# Patient Record
Sex: FEMALE | Race: WHITE | NOT HISPANIC OR LATINO | Employment: OTHER | URBAN - METROPOLITAN AREA
[De-identification: names, ages, dates, MRNs, and addresses within clinical notes are randomized per-mention and may not be internally consistent; named-entity substitution may affect disease eponyms.]

---

## 2021-09-07 ENCOUNTER — EVALUATION (OUTPATIENT)
Dept: PHYSICAL THERAPY | Facility: MEDICAL CENTER | Age: 71
End: 2021-09-07
Payer: COMMERCIAL

## 2021-09-07 DIAGNOSIS — Z96.642 STATUS POST TOTAL REPLACEMENT OF LEFT HIP: Primary | ICD-10-CM

## 2021-09-07 DIAGNOSIS — M25.551 RIGHT HIP PAIN: ICD-10-CM

## 2021-09-07 PROCEDURE — 97161 PT EVAL LOW COMPLEX 20 MIN: CPT | Performed by: PHYSICAL THERAPIST

## 2021-09-07 PROCEDURE — 97110 THERAPEUTIC EXERCISES: CPT | Performed by: PHYSICAL THERAPIST

## 2021-09-07 NOTE — LETTER
2021    [employer] [facility] [address] [facility] [address]    Patient: Nida Solis   YOB: 1950   Date of Visit: 2021     Encounter Diagnosis     ICD-10-CM    1  Status post total replacement of left hip  Z96 642    2  Right hip pain  M25 551        Dear Dr Santillan Chol: Thank you for your recent referral of Nida Solis  Please review the attached evaluation summary from Renay's recent visit  Please verify that you agree with the plan of care by signing the attached order  If you have any questions or concerns, please do not hesitate to call  I sincerely appreciate the opportunity to share in the care of one of your patients and hope to have another opportunity to work with you in the near future  Sincerely,    Kylie Estrada, PT      Referring Provider:      I certify that I have read the below Plan of Care and certify the need for these services furnished under this plan of treatment while under my care  Bibiana Stark [facility] [address] [address]  Via Fax: [phone]          PT Evaluation     Today's date: 2021  Patient name: Nida Solis  : 1950  MRN: [id number]  Referring provider: Alfredo Tran*  Dx:   Encounter Diagnosis     ICD-10-CM    1  Status post total replacement of left hip  Z96 642    2  Right hip pain  M25 551        Start Time: 1015  Stop Time: 1102  Total time in clinic (min): 47 minutes    Assessment  Assessment details: Pt is a 66 y o female who presents s/p L posterior NICA performed on 2021  Pt presents with increased R hip pain, decreased R hip ROM, decreased LE strength, gait impairments, poor stair mechanics and decreased activity tolerance secondary to increased symptoms  Pt denies pain or functional limitations secondary to L NICA    These impairments limit the patient from participating in completing stairs at [facility], decreased ability to complete house hold tasks such as cleaning, and decreased ability to participate in hobbies such as playing with dog  I believe this patient is a good candidate for and will benefit from skilled physical therapy for LE strengthening exercises, R hip ROM exercises, gait training and stair training to improve limitations stated above and assist the patient to prepare for up coming R NICA in October  Thank you for the opportunity to participate in Renay's care  Positive Prognostic Indicators: desire to improve, good tolerance to HHPT    Negative Prognostic Indicators:   Impairments: abnormal gait, abnormal or restricted ROM, abnormal movement, activity intolerance, impaired physical strength, lacks appropriate home exercise program and pain with function  Understanding of Dx/Px/POC: good   Prognosis: good    Goals  STGs: 4 weeks  1) Pt will have SPR decrease of 2 units at worst  2) pt will have improved R knee flexion strength by 1/2 muscle grade  3) pt will have improved foto score of 10 points    LTGs: 8 weeks  1) pt will be independent with HEP by D/C  2) pt will be independent with symptom management by D/C  3) pt will demonstrate ability to ascend and descend stairs reciprocally with 1 UE in order to demonstrate improved stair mechanics by DC      Plan  Patient would benefit from: skilled physical therapy  Planned modality interventions: cryotherapy and thermotherapy: hydrocollator packs  Planned therapy interventions: joint mobilization, manual therapy, neuromuscular re-education, patient education, strengthening, stretching, therapeutic activities, therapeutic exercise, home exercise program, gait training and functional ROM exercises  Frequency: 2x week  Duration in weeks: 8  Plan of Care beginning date: 9/7/2021  Plan of Care expiration date: 11/2/2021  Treatment plan discussed with: patient        Subjective Evaluation    History of Present Illness  Mechanism of injury: DOO:  NICKO:      Subjective Comments: pt reports L NICA on 7/27/2021  She reports that her L hip is ding well, but her R hip has increased pain  She states that her R hip is next to get replaced  This is scheduled for 10/8/2021  denies N&T in her LEs  Pt reports that she can do stairs, but she goes sideways  Denies falls  Pt ambulates with SPC all the time  Pt denies any pain or complications with her L hip with all functional activity  Pt has R hip pain that radiates to her R knee (which is replaced)  She puts ice on her R knee  Pain   Rest: 0/10   Best: 0/10    Worst: 0/10    R Hip:    Rest: 9/10    Best: 8/10    Worst: 9/10    Relieving Factors: Ice to R knee  Does not like to take medication  Exacerbating Factors: getting out of bed  Walking  Stairs  Sleeping: increased difficulty, attempts to sleep on R side, but this causes increased pain  Pt able to recite hip precautions  Home Set-up: pt has 14 steps from basement to main floor  Outside stairs are set in and more difficult  L railing going up  ADLs: independent     Work/Hobbies: watches TV, plays with dog  Previous Treatment: HHPT, not currently having this  Goals:  Wants to decrease pain in R hip  Wants to get R hip stronger for surgery in October               Objective     Active Range of Motion   Left Hip   Flexion: 90 degrees     Right Hip   Flexion: 95 degrees     Passive Range of Motion   Left Hip   Flexion: 90 degrees   Abduction: 35 degrees     Right Hip   Flexion: 95 degrees with pain  Abduction: 25 degrees with pain    Strength/Myotome Testing     Left Hip   Planes of Motion   Flexion: 4+  Abduction: 5  Adduction: 5    Right Hip   Planes of Motion   Flexion: 4+  Abduction: 5  Adduction: 5    Left Knee   Flexion: 4  Extension: 4+    Right Knee   Flexion: 4  Extension: 4+    Left Ankle/Foot   Dorsiflexion: 5  Plantar flexion: 5    Right Ankle/Foot   Dorsiflexion: 5  Plantar flexion: 5    Ambulation     Ambulation: Level Surfaces   Ambulation with assistive device: independent    Additional Level Surfaces Ambulation Details  SPC    Ambulation: Stairs   Ascend stairs: independent  Pattern: non-reciprocal  Railings: two rails  Descend stairs: independent  Pattern: non-reciprocal  Railings: two rails    Additional Stairs Ambulation Details  Pt able to complete stairs reciprocally when cued  Pt educated how to utilize [facility] with stairs and L railing (home set up)    Observational Gait   Gait: antalgic and asymmetric   Decreased walking speed, stride length and right stance time  Functional Assessment      Squat    Pain, left tibial anterior translation beyond toes and right tibial anterior translation beyond toes                Precautions: Posterior Hip precautions, R knee TKA, Hx lower lumbar fusion, R shoulder TSA      Manuals 9/7            L hip PROM                                                    Neuro Re-Ed             bridges x10            Hip add 5" x10            Hip abd             marching x10                                                   Ther Ex             bike             Mini squats x10            Side stepping             Step ups             Lateral step ups             Heel slides             Standing hip abd/ext x10 ea  b/l                         Ther Activity                                       Gait Training                                       Modalities

## 2021-09-07 NOTE — PROGRESS NOTES
PT Evaluation     Today's date: 2021  Patient name: Leida Eastman  : 1950  MRN: [id number]  Referring provider: Hayley Guillen*  Dx:   Encounter Diagnosis     ICD-10-CM    1  Status post total replacement of left hip  Z96 642    2  Right hip pain  M25 551        Start Time: 1015  Stop Time: 1102  Total time in clinic (min): 47 minutes    Assessment  Assessment details: Pt is a 66 y o female who presents s/p L posterior NICA performed on 2021  Pt presents with increased R hip pain, decreased R hip ROM, decreased LE strength, gait impairments, poor stair mechanics and decreased activity tolerance secondary to increased symptoms  Pt denies pain or functional limitations secondary to L NICA  These impairments limit the patient from participating in completing stairs at [facility], decreased ability to complete house hold tasks such as cleaning, and decreased ability to participate in hobbies such as playing with dog  I believe this patient is a good candidate for and will benefit from skilled physical therapy for LE strengthening exercises, R hip ROM exercises, gait training and stair training to improve limitations stated above and assist the patient to prepare for up coming R NICA in October  Thank you for the opportunity to participate in Renay's care      Positive Prognostic Indicators: desire to improve, good tolerance to HHPT    Negative Prognostic Indicators:   Impairments: abnormal gait, abnormal or restricted ROM, abnormal movement, activity intolerance, impaired physical strength, lacks appropriate home exercise program and pain with function  Understanding of Dx/Px/POC: good   Prognosis: good    Goals  STGs: 4 weeks  1) Pt will have SPR decrease of 2 units at worst  2) pt will have improved R knee flexion strength by 1/2 muscle grade  3) pt will have improved foto score of 10 points    LTGs: 8 weeks  1) pt will be independent with HEP by D/C  2) pt will be independent with symptom management by D/C  3) pt will demonstrate ability to ascend and descend stairs reciprocally with 1 UE in order to demonstrate improved stair mechanics by DC  Plan  Patient would benefit from: skilled physical therapy  Planned modality interventions: cryotherapy and thermotherapy: hydrocollator packs  Planned therapy interventions: joint mobilization, manual therapy, neuromuscular re-education, patient education, strengthening, stretching, therapeutic activities, therapeutic exercise, home exercise program, gait training and functional ROM exercises  Frequency: 2x week  Duration in weeks: 8  Plan of Care beginning date: 9/7/2021  Plan of Care expiration date: 11/2/2021  Treatment plan discussed with: patient        Subjective Evaluation    History of Present Illness  Mechanism of injury: DOO:  NICKO:      Subjective Comments: pt reports L NICA on 7/27/2021  She reports that her L hip is ding well, but her R hip has increased pain  She states that her R hip is next to get replaced  This is scheduled for 10/8/2021  denies N&T in her LEs  Pt reports that she can do stairs, but she goes sideways  Denies falls  Pt ambulates with SPC all the time  Pt denies any pain or complications with her L hip with all functional activity  Pt has R hip pain that radiates to her R knee (which is replaced)  She puts ice on her R knee  Pain   Rest: 0/10   Best: 0/10    Worst: 0/10    R Hip:    Rest: 9/10    Best: 8/10    Worst: 9/10    Relieving Factors: Ice to R knee  Does not like to take medication  Exacerbating Factors: getting out of bed  Walking  Stairs  Sleeping: increased difficulty, attempts to sleep on R side, but this causes increased pain  Pt able to recite hip precautions  Home Set-up: pt has 14 steps from basement to main floor  Outside stairs are set in and more difficult  L railing going up  ADLs: independent     Work/Hobbies: watches TV, plays with dog      Previous Treatment: HHPT, not currently having this  Goals:  Wants to decrease pain in R hip  Wants to get R hip stronger for surgery in October  Objective     Active Range of Motion   Left Hip   Flexion: 90 degrees     Right Hip   Flexion: 95 degrees     Passive Range of Motion   Left Hip   Flexion: 90 degrees   Abduction: 35 degrees     Right Hip   Flexion: 95 degrees with pain  Abduction: 25 degrees with pain    Strength/Myotome Testing     Left Hip   Planes of Motion   Flexion: 4+  Abduction: 5  Adduction: 5    Right Hip   Planes of Motion   Flexion: 4+  Abduction: 5  Adduction: 5    Left Knee   Flexion: 4  Extension: 4+    Right Knee   Flexion: 4  Extension: 4+    Left Ankle/Foot   Dorsiflexion: 5  Plantar flexion: 5    Right Ankle/Foot   Dorsiflexion: 5  Plantar flexion: 5    Ambulation     Ambulation: Level Surfaces   Ambulation with assistive device: independent    Additional Level Surfaces Ambulation Details  SPC    Ambulation: Stairs   Ascend stairs: independent  Pattern: non-reciprocal  Railings: two rails  Descend stairs: independent  Pattern: non-reciprocal  Railings: two rails    Additional Stairs Ambulation Details  Pt able to complete stairs reciprocally when cued  Pt educated how to utilize [facility] with stairs and L railing (home set up)    Observational Gait   Gait: antalgic and asymmetric   Decreased walking speed, stride length and right stance time  Functional Assessment      Squat    Pain, left tibial anterior translation beyond toes and right tibial anterior translation beyond toes                Precautions: Posterior Hip precautions, R knee TKA, Hx lower lumbar fusion, R shoulder TSA      Manuals 9/7            L hip PROM                                                    Neuro Re-Ed             bridges x10            Hip add 5" x10            Hip abd             marching x10                                                   Ther Ex             bike             Mini squats x10            Side stepping Step ups             Lateral step ups             Heel slides             Standing hip abd/ext x10 ea  b/l                         Ther Activity                                       Gait Training                                       Modalities

## 2021-09-10 ENCOUNTER — OFFICE VISIT (OUTPATIENT)
Dept: PHYSICAL THERAPY | Facility: MEDICAL CENTER | Age: 71
End: 2021-09-10
Payer: COMMERCIAL

## 2021-09-10 DIAGNOSIS — M25.551 RIGHT HIP PAIN: ICD-10-CM

## 2021-09-10 DIAGNOSIS — Z96.642 STATUS POST TOTAL REPLACEMENT OF LEFT HIP: Primary | ICD-10-CM

## 2021-09-10 PROCEDURE — 97112 NEUROMUSCULAR REEDUCATION: CPT | Performed by: PHYSICAL THERAPIST

## 2021-09-10 PROCEDURE — 97110 THERAPEUTIC EXERCISES: CPT | Performed by: PHYSICAL THERAPIST

## 2021-09-10 NOTE — PROGRESS NOTES
Daily Note     Today's date: 9/10/2021  Patient name: Aurelio Kathleen  : 1950  MRN: [id number]  Referring provider: Tonya Moreland*  Dx:   Encounter Diagnosis     ICD-10-CM    1  Status post total replacement of left hip  Z96 642    2  Right hip pain  M25 551        Start Time: 0800  Stop Time: 0834  Total time in clinic (min): 34 minutes    Subjective: pt reports to therapy stating that she had some soreness afterwards, but this is improving  Objective: See treatment diary below      Assessment: Tolerated treatment well  Pt completed all exercises with increased R hip pain that was maintained in a tolerable range  HEP progressed  Pt educated to monitor symptoms and gauge HEP intensity as tolerated  We will continue to progress as tolerated  Patient would benefit from continued PT      Plan: Continue per plan of care        Precautions: Posterior Hip precautions, R knee TKA, Hx lower lumbar fusion, R shoulder TSA      Manuals  910           L hip PROM                                                    Neuro Re-Ed             bridges x10 x10           Hip add 5" x10 5"x10           Hip abd  gtb 3" x20           marching x10 2x10                                                  Ther Ex             bike  5'           Mini squats x10 x10           Side stepping  3 laps           Step ups             Lateral step ups             Heel slides  x20           Standing hip abd/ext x10 ea  b/l x20 ea  b/l                        Ther Activity                                       Gait Training                                       Modalities

## 2021-09-15 ENCOUNTER — OFFICE VISIT (OUTPATIENT)
Dept: PHYSICAL THERAPY | Facility: MEDICAL CENTER | Age: 71
End: 2021-09-15
Payer: COMMERCIAL

## 2021-09-15 DIAGNOSIS — M25.551 RIGHT HIP PAIN: ICD-10-CM

## 2021-09-15 DIAGNOSIS — Z96.642 STATUS POST TOTAL REPLACEMENT OF LEFT HIP: Primary | ICD-10-CM

## 2021-09-15 PROCEDURE — 97110 THERAPEUTIC EXERCISES: CPT | Performed by: PHYSICAL THERAPIST

## 2021-09-15 PROCEDURE — 97112 NEUROMUSCULAR REEDUCATION: CPT | Performed by: PHYSICAL THERAPIST

## 2021-09-23 ENCOUNTER — APPOINTMENT (OUTPATIENT)
Dept: PHYSICAL THERAPY | Facility: MEDICAL CENTER | Age: 71
End: 2021-09-23
Payer: COMMERCIAL

## 2021-09-30 ENCOUNTER — APPOINTMENT (OUTPATIENT)
Dept: PHYSICAL THERAPY | Facility: MEDICAL CENTER | Age: 71
End: 2021-09-30
Payer: COMMERCIAL

## 2021-10-27 ENCOUNTER — TELEPHONE (OUTPATIENT)
Dept: FAMILY MEDICINE CLINIC | Facility: CLINIC | Age: 71
End: 2021-10-27

## 2021-11-30 ENCOUNTER — EVALUATION (OUTPATIENT)
Dept: PHYSICAL THERAPY | Facility: MEDICAL CENTER | Age: 71
End: 2021-11-30
Payer: COMMERCIAL

## 2021-11-30 DIAGNOSIS — Z96.641 STATUS POST TOTAL HIP REPLACEMENT, RIGHT: Primary | ICD-10-CM

## 2021-11-30 PROCEDURE — 97110 THERAPEUTIC EXERCISES: CPT | Performed by: PHYSICAL THERAPIST

## 2021-11-30 PROCEDURE — 97161 PT EVAL LOW COMPLEX 20 MIN: CPT | Performed by: PHYSICAL THERAPIST

## 2021-12-02 ENCOUNTER — OFFICE VISIT (OUTPATIENT)
Dept: FAMILY MEDICINE CLINIC | Facility: CLINIC | Age: 71
End: 2021-12-02
Payer: COMMERCIAL

## 2021-12-02 ENCOUNTER — OFFICE VISIT (OUTPATIENT)
Dept: PHYSICAL THERAPY | Facility: MEDICAL CENTER | Age: 71
End: 2021-12-02
Payer: COMMERCIAL

## 2021-12-02 VITALS
DIASTOLIC BLOOD PRESSURE: 82 MMHG | TEMPERATURE: 97.7 F | OXYGEN SATURATION: 98 % | BODY MASS INDEX: 32.65 KG/M2 | RESPIRATION RATE: 18 BRPM | HEIGHT: 65 IN | HEART RATE: 67 BPM | WEIGHT: 196 LBS | SYSTOLIC BLOOD PRESSURE: 130 MMHG

## 2021-12-02 DIAGNOSIS — M46.1 SACROILIITIS, NOT ELSEWHERE CLASSIFIED (HCC): ICD-10-CM

## 2021-12-02 DIAGNOSIS — D69.6 THROMBOCYTOPENIA (HCC): ICD-10-CM

## 2021-12-02 DIAGNOSIS — K21.9 GASTROESOPHAGEAL REFLUX DISEASE WITHOUT ESOPHAGITIS: ICD-10-CM

## 2021-12-02 DIAGNOSIS — Z12.31 ENCOUNTER FOR SCREENING MAMMOGRAM FOR BREAST CANCER: ICD-10-CM

## 2021-12-02 DIAGNOSIS — Z12.12 SCREENING FOR COLORECTAL CANCER: ICD-10-CM

## 2021-12-02 DIAGNOSIS — Z11.59 NEED FOR HEPATITIS C SCREENING TEST: ICD-10-CM

## 2021-12-02 DIAGNOSIS — E03.9 HYPOTHYROIDISM, UNSPECIFIED TYPE: ICD-10-CM

## 2021-12-02 DIAGNOSIS — Z96.641 STATUS POST TOTAL HIP REPLACEMENT, RIGHT: Primary | ICD-10-CM

## 2021-12-02 DIAGNOSIS — Z12.11 SCREENING FOR COLORECTAL CANCER: ICD-10-CM

## 2021-12-02 DIAGNOSIS — F32.1 CURRENT MODERATE EPISODE OF MAJOR DEPRESSIVE DISORDER WITHOUT PRIOR EPISODE (HCC): ICD-10-CM

## 2021-12-02 DIAGNOSIS — Z00.00 WELL ADULT EXAM: Primary | ICD-10-CM

## 2021-12-02 DIAGNOSIS — C83.00 SMALL B-CELL LYMPHOMA, UNSPECIFIED BODY REGION (HCC): ICD-10-CM

## 2021-12-02 PROBLEM — F31.31 BIPOLAR AFFECTIVE DISORDER, CURRENTLY DEPRESSED, MILD (HCC): Status: ACTIVE | Noted: 2018-09-04

## 2021-12-02 PROBLEM — Z98.890 HISTORY OF LUMBOSACRAL SPINE SURGERY: Status: ACTIVE | Noted: 2021-04-09

## 2021-12-02 PROBLEM — Z86.711 HISTORY OF PULMONARY EMBOLISM: Status: ACTIVE | Noted: 2018-09-04

## 2021-12-02 PROBLEM — Z96.649 HISTORY OF HIP JOINT REPLACEMENT BY OTHER MEANS: Status: ACTIVE | Noted: 2021-07-27

## 2021-12-02 PROBLEM — Z96.651 HISTORY OF TOTAL KNEE ARTHROPLASTY, RIGHT: Status: ACTIVE | Noted: 2021-07-12

## 2021-12-02 PROCEDURE — 97110 THERAPEUTIC EXERCISES: CPT | Performed by: PHYSICAL THERAPIST

## 2021-12-02 PROCEDURE — 3725F SCREEN DEPRESSION PERFORMED: CPT | Performed by: FAMILY MEDICINE

## 2021-12-02 PROCEDURE — 3288F FALL RISK ASSESSMENT DOCD: CPT | Performed by: FAMILY MEDICINE

## 2021-12-02 PROCEDURE — 3008F BODY MASS INDEX DOCD: CPT | Performed by: FAMILY MEDICINE

## 2021-12-02 PROCEDURE — 1125F AMNT PAIN NOTED PAIN PRSNT: CPT | Performed by: FAMILY MEDICINE

## 2021-12-02 PROCEDURE — 1036F TOBACCO NON-USER: CPT | Performed by: FAMILY MEDICINE

## 2021-12-02 PROCEDURE — 1160F RVW MEDS BY RX/DR IN RCRD: CPT | Performed by: FAMILY MEDICINE

## 2021-12-02 PROCEDURE — 1170F FXNL STATUS ASSESSED: CPT | Performed by: FAMILY MEDICINE

## 2021-12-02 PROCEDURE — G0439 PPPS, SUBSEQ VISIT: HCPCS | Performed by: FAMILY MEDICINE

## 2021-12-02 PROCEDURE — 99214 OFFICE O/P EST MOD 30 MIN: CPT | Performed by: FAMILY MEDICINE

## 2021-12-02 PROCEDURE — 97112 NEUROMUSCULAR REEDUCATION: CPT | Performed by: PHYSICAL THERAPIST

## 2021-12-02 RX ORDER — LEVOTHYROXINE SODIUM 0.07 MG/1
TABLET ORAL
COMMUNITY
Start: 2021-07-27 | End: 2022-01-03 | Stop reason: SDUPTHER

## 2021-12-02 RX ORDER — FACIAL-BODY WIPES
EACH TOPICAL
COMMUNITY
Start: 2021-10-28 | End: 2022-03-03

## 2021-12-02 RX ORDER — OMEPRAZOLE 40 MG/1
40 CAPSULE, DELAYED RELEASE ORAL DAILY
Qty: 90 CAPSULE | Refills: 1 | Status: SHIPPED | OUTPATIENT
Start: 2021-12-02 | End: 2021-12-02 | Stop reason: SDUPTHER

## 2021-12-02 RX ORDER — DIVALPROEX SODIUM 500 MG/1
TABLET, DELAYED RELEASE ORAL
COMMUNITY
Start: 2021-06-18 | End: 2022-06-09 | Stop reason: SDUPTHER

## 2021-12-02 RX ORDER — PSEUDOEPHEDRINE HCL 30 MG
100 TABLET ORAL
COMMUNITY
Start: 2021-10-24 | End: 2022-03-03

## 2021-12-02 RX ORDER — FAMOTIDINE 20 MG/1
TABLET, FILM COATED ORAL
COMMUNITY
Start: 2021-06-30 | End: 2022-03-03

## 2021-12-02 RX ORDER — TRAZODONE HYDROCHLORIDE 50 MG/1
TABLET ORAL
COMMUNITY
Start: 2021-10-18 | End: 2022-01-21 | Stop reason: SDUPTHER

## 2021-12-02 RX ORDER — VENLAFAXINE HYDROCHLORIDE 150 MG/1
CAPSULE, EXTENDED RELEASE ORAL
COMMUNITY
Start: 2021-09-20 | End: 2022-01-03 | Stop reason: SDUPTHER

## 2021-12-02 RX ORDER — ASPIRIN 81 MG
TABLET,CHEWABLE ORAL
COMMUNITY
Start: 2021-10-24 | End: 2022-03-03

## 2021-12-02 RX ORDER — OMEPRAZOLE 40 MG/1
40 CAPSULE, DELAYED RELEASE ORAL DAILY
Qty: 90 CAPSULE | Refills: 1 | Status: SHIPPED | OUTPATIENT
Start: 2021-12-02 | End: 2022-03-03

## 2021-12-07 ENCOUNTER — OFFICE VISIT (OUTPATIENT)
Dept: PHYSICAL THERAPY | Facility: MEDICAL CENTER | Age: 71
End: 2021-12-07
Payer: COMMERCIAL

## 2021-12-07 DIAGNOSIS — Z96.641 STATUS POST TOTAL HIP REPLACEMENT, RIGHT: Primary | ICD-10-CM

## 2021-12-07 PROCEDURE — 97110 THERAPEUTIC EXERCISES: CPT | Performed by: PHYSICAL THERAPIST

## 2021-12-07 PROCEDURE — 97112 NEUROMUSCULAR REEDUCATION: CPT | Performed by: PHYSICAL THERAPIST

## 2021-12-07 PROCEDURE — 97140 MANUAL THERAPY 1/> REGIONS: CPT | Performed by: PHYSICAL THERAPIST

## 2021-12-10 ENCOUNTER — APPOINTMENT (OUTPATIENT)
Dept: PHYSICAL THERAPY | Facility: MEDICAL CENTER | Age: 71
End: 2021-12-10
Payer: COMMERCIAL

## 2021-12-14 ENCOUNTER — APPOINTMENT (OUTPATIENT)
Dept: PHYSICAL THERAPY | Facility: MEDICAL CENTER | Age: 71
End: 2021-12-14
Payer: COMMERCIAL

## 2021-12-16 ENCOUNTER — OFFICE VISIT (OUTPATIENT)
Dept: PHYSICAL THERAPY | Facility: MEDICAL CENTER | Age: 71
End: 2021-12-16
Payer: COMMERCIAL

## 2021-12-16 DIAGNOSIS — Z96.641 STATUS POST TOTAL HIP REPLACEMENT, RIGHT: Primary | ICD-10-CM

## 2021-12-16 PROCEDURE — 97110 THERAPEUTIC EXERCISES: CPT | Performed by: PHYSICAL THERAPIST

## 2021-12-16 PROCEDURE — 97112 NEUROMUSCULAR REEDUCATION: CPT | Performed by: PHYSICAL THERAPIST

## 2021-12-16 PROCEDURE — 97140 MANUAL THERAPY 1/> REGIONS: CPT | Performed by: PHYSICAL THERAPIST

## 2021-12-21 ENCOUNTER — OFFICE VISIT (OUTPATIENT)
Dept: PHYSICAL THERAPY | Facility: MEDICAL CENTER | Age: 71
End: 2021-12-21
Payer: COMMERCIAL

## 2021-12-21 DIAGNOSIS — Z96.641 STATUS POST TOTAL HIP REPLACEMENT, RIGHT: Primary | ICD-10-CM

## 2021-12-21 PROCEDURE — 97140 MANUAL THERAPY 1/> REGIONS: CPT | Performed by: PHYSICAL THERAPIST

## 2021-12-21 PROCEDURE — 97110 THERAPEUTIC EXERCISES: CPT | Performed by: PHYSICAL THERAPIST

## 2021-12-21 PROCEDURE — 97112 NEUROMUSCULAR REEDUCATION: CPT | Performed by: PHYSICAL THERAPIST

## 2021-12-28 ENCOUNTER — EVALUATION (OUTPATIENT)
Dept: PHYSICAL THERAPY | Facility: MEDICAL CENTER | Age: 71
End: 2021-12-28
Payer: COMMERCIAL

## 2021-12-28 DIAGNOSIS — Z96.641 STATUS POST TOTAL HIP REPLACEMENT, RIGHT: Primary | ICD-10-CM

## 2021-12-28 LAB — COLOGUARD RESULT REPORTABLE: NEGATIVE

## 2021-12-28 PROCEDURE — 97112 NEUROMUSCULAR REEDUCATION: CPT | Performed by: PHYSICAL THERAPIST

## 2021-12-28 PROCEDURE — 97110 THERAPEUTIC EXERCISES: CPT | Performed by: PHYSICAL THERAPIST

## 2021-12-28 PROCEDURE — 97140 MANUAL THERAPY 1/> REGIONS: CPT | Performed by: PHYSICAL THERAPIST

## 2022-01-03 DIAGNOSIS — E03.9 HYPOTHYROIDISM, UNSPECIFIED TYPE: Primary | ICD-10-CM

## 2022-01-03 DIAGNOSIS — F32.1 CURRENT MODERATE EPISODE OF MAJOR DEPRESSIVE DISORDER WITHOUT PRIOR EPISODE (HCC): ICD-10-CM

## 2022-01-04 RX ORDER — LEVOTHYROXINE SODIUM 0.07 MG/1
75 TABLET ORAL
Qty: 90 TABLET | Refills: 1 | Status: SHIPPED | OUTPATIENT
Start: 2022-01-04 | End: 2022-04-15 | Stop reason: SDUPTHER

## 2022-01-04 RX ORDER — VENLAFAXINE HYDROCHLORIDE 150 MG/1
150 CAPSULE, EXTENDED RELEASE ORAL DAILY
Qty: 90 CAPSULE | Refills: 1 | Status: SHIPPED | OUTPATIENT
Start: 2022-01-04 | End: 2022-04-01

## 2022-01-21 DIAGNOSIS — F33.41 RECURRENT MAJOR DEPRESSIVE DISORDER, IN PARTIAL REMISSION (HCC): Primary | ICD-10-CM

## 2022-01-21 NOTE — TELEPHONE ENCOUNTER
Pt left msg on script line   Would like a 90 day supply to go to her [employer] for a 90 day supply

## 2022-01-22 RX ORDER — TRAZODONE HYDROCHLORIDE 50 MG/1
50 TABLET ORAL
Qty: 90 TABLET | Refills: 0 | Status: SHIPPED | OUTPATIENT
Start: 2022-01-22 | End: 2022-04-15 | Stop reason: SDUPTHER

## 2022-02-02 ENCOUNTER — APPOINTMENT (OUTPATIENT)
Dept: LAB | Facility: MEDICAL CENTER | Age: 72
End: 2022-02-02
Payer: COMMERCIAL

## 2022-02-02 DIAGNOSIS — Z11.59 NEED FOR HEPATITIS C SCREENING TEST: ICD-10-CM

## 2022-02-02 DIAGNOSIS — E03.9 HYPOTHYROIDISM, UNSPECIFIED TYPE: ICD-10-CM

## 2022-02-02 LAB
HCV AB SER QL: NORMAL
TSH SERPL DL<=0.05 MIU/L-ACNC: 2.12 UIU/ML (ref 0.36–3.74)

## 2022-02-02 PROCEDURE — 86803 HEPATITIS C AB TEST: CPT

## 2022-02-02 PROCEDURE — 36415 COLL VENOUS BLD VENIPUNCTURE: CPT

## 2022-02-02 PROCEDURE — 84443 ASSAY THYROID STIM HORMONE: CPT

## 2022-02-22 ENCOUNTER — HOSPITAL ENCOUNTER (OUTPATIENT)
Dept: RADIOLOGY | Facility: HOSPITAL | Age: 72
Discharge: HOME | End: 2022-02-22
Attending: RADIOLOGY

## 2022-02-22 DIAGNOSIS — Z76.89 REFERRAL OF PATIENT WITHOUT EXAMINATION OR TREATMENT: ICD-10-CM

## 2022-03-03 ENCOUNTER — OFFICE VISIT (OUTPATIENT)
Dept: FAMILY MEDICINE CLINIC | Facility: CLINIC | Age: 72
End: 2022-03-03

## 2022-03-03 VITALS
DIASTOLIC BLOOD PRESSURE: 98 MMHG | OXYGEN SATURATION: 98 % | SYSTOLIC BLOOD PRESSURE: 144 MMHG | HEIGHT: 65 IN | BODY MASS INDEX: 32.99 KG/M2 | TEMPERATURE: 97.6 F | HEART RATE: 74 BPM | WEIGHT: 198 LBS

## 2022-03-03 DIAGNOSIS — E03.4 HYPOTHYROIDISM DUE TO ACQUIRED ATROPHY OF THYROID: ICD-10-CM

## 2022-03-03 DIAGNOSIS — D69.6 THROMBOCYTOPENIA (HCC): ICD-10-CM

## 2022-03-03 DIAGNOSIS — C83.00 SMALL B-CELL LYMPHOMA, UNSPECIFIED BODY REGION (HCC): ICD-10-CM

## 2022-03-03 DIAGNOSIS — F31.31 BIPOLAR AFFECTIVE DISORDER, CURRENTLY DEPRESSED, MILD (HCC): ICD-10-CM

## 2022-03-03 DIAGNOSIS — K21.9 GASTROESOPHAGEAL REFLUX DISEASE WITHOUT ESOPHAGITIS: Primary | ICD-10-CM

## 2022-03-03 DIAGNOSIS — M46.1 SACROILIITIS, NOT ELSEWHERE CLASSIFIED (HCC): ICD-10-CM

## 2022-03-03 PROCEDURE — 1036F TOBACCO NON-USER: CPT | Performed by: FAMILY MEDICINE

## 2022-03-03 PROCEDURE — 99214 OFFICE O/P EST MOD 30 MIN: CPT | Performed by: FAMILY MEDICINE

## 2022-03-03 PROCEDURE — 3008F BODY MASS INDEX DOCD: CPT | Performed by: FAMILY MEDICINE

## 2022-03-03 PROCEDURE — 1160F RVW MEDS BY RX/DR IN RCRD: CPT | Performed by: FAMILY MEDICINE

## 2022-03-03 RX ORDER — PANTOPRAZOLE SODIUM 40 MG/1
40 TABLET, DELAYED RELEASE ORAL
Qty: 90 TABLET | Refills: 1 | Status: SHIPPED | OUTPATIENT
Start: 2022-03-03

## 2022-03-03 RX ORDER — VENLAFAXINE HYDROCHLORIDE 75 MG/1
75 CAPSULE, EXTENDED RELEASE ORAL
Qty: 90 CAPSULE | Refills: 1 | Status: SHIPPED | OUTPATIENT
Start: 2022-03-03 | End: 2022-05-09 | Stop reason: SDUPTHER

## 2022-03-03 NOTE — PROGRESS NOTES
Depression Screening and Follow-up Plan: Patient assessed for underlying major depression  Brief counseling provided and recommend additional follow-up/re-evaluation next office visit  Patient advised to follow-up with PCP for further management  Falls Plan of Care: balance, strength, and gait training instructions were provided  Home safety education provided  Assessment/Plan:         Problem List Items Addressed This Visit        Digestive    Gastroesophageal reflux disease without esophagitis - Primary     Patient will try and DC or decrease her caffeine intake  Will DC her omeprazole will start her on Protonix  If not better to GI for further evaluation  Relevant Medications    pantoprazole (PROTONIX) 40 mg tablet       Endocrine    Hypothyroidism     Patient to continue current dose of thyroid medicine and recheck TSH in 6 months            Musculoskeletal and Integument    Sacroiliitis, not elsewhere classified ([facility] [address] )       Other    Bipolar affective disorder, currently depressed, mild ([facility] [address] )     Patient is stable  and will continue present plan of care and reassess at next routine visit  All questions about this problem from patient were answered today  Relevant Medications    venlafaxine (EFFEXOR-XR) 75 mg 24 hr capsule    Small B-cell lymphoma (HCC)    Thrombocytopenia (HCC)            Subjective:      Patient ID: Lnaa Maya is a 66 y o  female  THIS 66YEAR-OLD FEMALE HERE TODAY FOR CHECKUP ON upper GI complaints  Patient feels gurgling and little pain in her mid epigastric area she has had this for few months  Patient has been taking omeprazole which is not really been helping  Patient does drink coffee morning she does not use any alcohol or any tobacco   Also discussed with her about the watching her high fat foods as well as foods containing caffeine like chocolate or soft drinks    Will see about changing patient to some Protonix 40 mg once a day and will bring her back in about 1 month to see she is doing with that if she is doing better with that will continue with the medicine for a few months and then withdraw to see how she does if she is not doing any better or she has to go back on the medicine is not helping we need to have her see Gastroenterology to find out what is going on with her stomach could this possibly be reflux versus esophageal achalasia or hiatal hernia  The following portions of the patient's history were reviewed and updated as appropriate:   Past Medical History:  She has a past medical history of H/O bilateral hip replacements  ,  _______________________________________________________________________  Medical Problems:  does not have any pertinent problems on file ,  _______________________________________________________________________  Past Surgical History:   has a past surgical history that includes Hip surgery; Knee surgery; and Joint replacement  ,  _______________________________________________________________________  Family History:  family history is not on file ,  _______________________________________________________________________  Social History:   reports that she has never smoked  She has never used smokeless tobacco  She reports current alcohol use  She reports that she does not use drugs  ,  _______________________________________________________________________  Allergies:  is allergic to clindamycin     _______________________________________________________________________  Current Outpatient Medications   Medication Sig Dispense Refill    divalproex sodium (DEPAKOTE) 500 mg EC tablet TAKE ONE TABLET BY MOUTH EVERY MORNING AND ONE TABLET EVERY NIGHT      levothyroxine 75 mcg tablet Take 1 tablet (75 mcg total) by mouth daily in the early morning 90 tablet 1    traZODone (DESYREL) 50 mg tablet Take 1 tablet (50 mg total) by mouth daily at bedtime as needed for sleep 90 tablet 0    venlafaxine (EFFEXOR-XR) 150 mg 24 hr capsule Take 1 capsule (150 mg total) by mouth daily 90 capsule 1    pantoprazole (PROTONIX) 40 mg tablet Take 1 tablet (40 mg total) by mouth daily before breakfast 90 tablet 1    venlafaxine (EFFEXOR-XR) 75 mg 24 hr capsule Take 1 capsule (75 mg total) by mouth daily with breakfast 90 capsule 1     No current facility-administered medications for this visit      _______________________________________________________________________  Review of Systems   Constitutional: Negative for activity change, appetite change, fatigue and fever  HENT: Negative for congestion, ear pain, postnasal drip, rhinorrhea, sinus pressure, sinus pain, sneezing and sore throat  Eyes: Negative for pain and redness  Respiratory: Negative for apnea, cough, chest tightness, shortness of breath and wheezing  Cardiovascular: Negative for chest pain, palpitations and leg swelling  Gastrointestinal: Positive for abdominal pain  Negative for constipation, diarrhea, nausea and vomiting  Endocrine: Negative for cold intolerance and heat intolerance  Genitourinary: Negative for difficulty urinating, dysuria, frequency, hematuria and urgency  Musculoskeletal: Negative for arthralgias, back pain, gait problem and myalgias  Skin: Negative for rash  Neurological: Negative for dizziness, speech difficulty, weakness, numbness and headaches  Hematological: Does not bruise/bleed easily  Psychiatric/Behavioral: Negative for agitation, confusion and hallucinations  Objective:  Vitals:    03/03/22 1729   BP: 144/98   BP Location: Left arm   Patient Position: Sitting   Cuff Size: Standard   Pulse: 74   Temp: 97 6 °F (36 4 °C)   TempSrc: Skin   SpO2: 98%   Weight: 89 8 kg (198 lb)   Height: 5' 5" (1 651 m)     Body mass index is 32 95 kg/m²  Physical Exam  Vitals and nursing note reviewed  Constitutional:       Appearance: She is well-developed  HENT:      Head: Normocephalic and atraumatic        Nose: Nose normal  Eyes:      General: No scleral icterus  Conjunctiva/sclera: Conjunctivae normal       Pupils: Pupils are equal, round, and reactive to light  Neck:      Thyroid: No thyromegaly  Pulmonary:      Effort: Pulmonary effort is normal       Breath sounds: Normal breath sounds  No wheezing  Abdominal:      General: Bowel sounds are normal  There is no distension  Palpations: Abdomen is soft  Tenderness: There is abdominal tenderness  There is no guarding or rebound  Musculoskeletal:         General: No tenderness or deformity  Normal range of motion  Cervical back: Normal range of motion and neck supple  Skin:     General: Skin is warm and dry  Findings: No erythema or rash  Neurological:      Mental Status: She is alert and oriented to person, place, and time  Sensory: No sensory deficit  Psychiatric:         Behavior: Behavior normal          Thought Content:  Thought content normal          Judgment: Judgment normal

## 2022-03-03 NOTE — ASSESSMENT & PLAN NOTE
Patient will try and DC or decrease her caffeine intake  Will DC her omeprazole will start her on Protonix  If not better to GI for further evaluation

## 2022-04-09 ENCOUNTER — RA CDI HCC (OUTPATIENT)
Dept: OTHER | Facility: HOSPITAL | Age: 72
End: 2022-04-09

## 2022-04-09 NOTE — PROGRESS NOTES
Jessy [facility]  coding opportunities     [id number]     Chart Reviewed number of suggestions sent to Provider: 1     Patients Insurance     Medicare Insurance: [address] [place] [address]

## 2022-04-15 ENCOUNTER — OFFICE VISIT (OUTPATIENT)
Dept: FAMILY MEDICINE CLINIC | Facility: CLINIC | Age: 72
End: 2022-04-15
Payer: COMMERCIAL

## 2022-04-15 VITALS
BODY MASS INDEX: 32.82 KG/M2 | DIASTOLIC BLOOD PRESSURE: 82 MMHG | WEIGHT: 197 LBS | SYSTOLIC BLOOD PRESSURE: 138 MMHG | HEART RATE: 72 BPM | TEMPERATURE: 97.9 F | OXYGEN SATURATION: 98 % | HEIGHT: 65 IN

## 2022-04-15 DIAGNOSIS — F33.41 RECURRENT MAJOR DEPRESSIVE DISORDER, IN PARTIAL REMISSION (HCC): ICD-10-CM

## 2022-04-15 DIAGNOSIS — E03.9 HYPOTHYROIDISM, UNSPECIFIED TYPE: ICD-10-CM

## 2022-04-15 DIAGNOSIS — F31.31 BIPOLAR AFFECTIVE DISORDER, CURRENTLY DEPRESSED, MILD (HCC): ICD-10-CM

## 2022-04-15 DIAGNOSIS — E03.4 HYPOTHYROIDISM DUE TO ACQUIRED ATROPHY OF THYROID: Primary | ICD-10-CM

## 2022-04-15 DIAGNOSIS — Z78.0 MENOPAUSE: ICD-10-CM

## 2022-04-15 DIAGNOSIS — K21.9 GASTROESOPHAGEAL REFLUX DISEASE WITHOUT ESOPHAGITIS: ICD-10-CM

## 2022-04-15 PROCEDURE — 99214 OFFICE O/P EST MOD 30 MIN: CPT | Performed by: FAMILY MEDICINE

## 2022-04-15 RX ORDER — TRAZODONE HYDROCHLORIDE 50 MG/1
50 TABLET ORAL
Qty: 90 TABLET | Refills: 1 | Status: SHIPPED | OUTPATIENT
Start: 2022-04-15

## 2022-04-15 RX ORDER — LEVOTHYROXINE SODIUM 0.07 MG/1
75 TABLET ORAL
Qty: 90 TABLET | Refills: 1 | Status: SHIPPED | OUTPATIENT
Start: 2022-04-15

## 2022-04-15 RX ORDER — DIPHENOXYLATE HYDROCHLORIDE AND ATROPINE SULFATE 2.5; .025 MG/1; MG/1
1 TABLET ORAL DAILY
COMMUNITY

## 2022-04-15 NOTE — PROGRESS NOTES
BMI Counseling: There is no height or weight on file to calculate BMI  The BMI is above normal  Nutrition recommendations include decreasing portion sizes, encouraging healthy choices of fruits and vegetables, decreasing fast food intake, consuming healthier snacks, limiting drinks that contain sugar, moderation in carbohydrate intake, increasing intake of lean protein, reducing intake of saturated and trans fat and reducing intake of cholesterol  Exercise recommendations include exercising 3-5 times per week  No pharmacotherapy was ordered  Patient referred to PCP  Rationale for BMI follow-up plan is due to patient being overweight or obese  Depression Screening and Follow-up Plan: Patient assessed for underlying major depression  Brief counseling provided and recommend additional follow-up/re-evaluation next office visit  Continue regular follow-up with their mental health provider who is managing their mental health condition(s)  Patient advised to follow-up with PCP for further management  Falls Plan of Care: balance, strength, and gait training instructions were provided  Home safety education provided  Assessment/Plan:         Problem List Items Addressed This Visit        Digestive    Gastroesophageal reflux disease without esophagitis     Patient to continue with present therapy and decrease caffeine, avoid ETOH and smoking to decrease acid production  Pt should also cease eating prior to bedtime and avoid excessive fluid intake prior to sleep  May use antacids as needed for breakthrough GERD  All pateint questions answered today about this condition           Relevant Orders    Ambulatory Referral to Gastroenterology       Endocrine    Hypothyroidism - Primary     Patient to continue current dose of thyroid medicine and recheck TSH in 6 months         Relevant Medications    levothyroxine 75 mcg tablet    Other Relevant Orders    TSH, 3rd generation with Free T4 reflex       Other    Bipolar affective disorder, currently depressed, mild ([address] )     Patient is stable  and will continue present plan of care and reassess at next routine visit  All questions about this problem from patient were answered today  Relevant Medications    traZODone (DESYREL) 50 mg tablet      Other Visit Diagnoses     Menopause        Relevant Orders    DXA bone density spine hip and pelvis    Recurrent major depressive disorder, in partial remission (HCC)        Relevant Medications    traZODone (DESYREL) 50 mg tablet            Subjective:      Patient ID: Porfirio Doherty is a 66 y o  female  This is a 66 YOWF seen and examined in the office for multiple medical problems  Pt has GERD and is  Still with symptoms with recent change to a PPI  Pt will warrant a CHECK UP BY gi AND WILL PROBABLY NEED AN egd TO FURTHER EVAL HER gerd, pT  ALSO WITH BIPOLAR disorder and hypothyroidism  Pt is doing well with her meds and will need refills  Pt also need slabs ordered for her next OV  Pt als needs her DEXA scan ordered today  The following portions of the patient's history were reviewed and updated as appropriate:   Past Medical History:  She has a past medical history of H/O bilateral hip replacements  ,  _______________________________________________________________________  Medical Problems:  does not have any pertinent problems on file ,  _______________________________________________________________________  Past Surgical History:   has a past surgical history that includes Hip surgery; Knee surgery; and Joint replacement  ,  _______________________________________________________________________  Family History:  family history is not on file ,  _______________________________________________________________________  Social History:   reports that she has never smoked  She has never used smokeless tobacco  She reports current alcohol use   She reports that she does not use drugs ,  _______________________________________________________________________  Allergies:  is allergic to clindamycin     _______________________________________________________________________  Current Outpatient Medications   Medication Sig Dispense Refill    divalproex sodium (DEPAKOTE) 500 mg EC tablet TAKE ONE TABLET BY MOUTH EVERY MORNING AND ONE TABLET EVERY NIGHT      levothyroxine 75 mcg tablet Take 1 tablet (75 mcg total) by mouth daily in the early morning 90 tablet 1    multivitamin (THERAGRAN) TABS Take 1 tablet by mouth daily      pantoprazole (PROTONIX) 40 mg tablet Take 1 tablet (40 mg total) by mouth daily before breakfast 90 tablet 1    traZODone (DESYREL) 50 mg tablet Take 1 tablet (50 mg total) by mouth daily at bedtime as needed for sleep 90 tablet 1    venlafaxine (EFFEXOR-XR) 150 mg 24 hr capsule TAKE ONE CAPSULE BY MOUTH EVERY DAY 90 capsule 0    venlafaxine (EFFEXOR-XR) 75 mg 24 hr capsule Take 1 capsule (75 mg total) by mouth daily with breakfast 90 capsule 1     No current facility-administered medications for this visit      _______________________________________________________________________  Review of Systems   Constitutional: Negative for activity change, appetite change, fatigue and fever  HENT: Negative for congestion, ear pain, postnasal drip, rhinorrhea, sinus pressure, sinus pain, sneezing and sore throat  Eyes: Negative for pain and redness  Respiratory: Negative for apnea, cough, chest tightness, shortness of breath and wheezing  Cardiovascular: Negative for chest pain, palpitations and leg swelling  Gastrointestinal: Positive for abdominal pain  Negative for constipation, diarrhea, nausea and vomiting  Endocrine: Negative for cold intolerance and heat intolerance  Genitourinary: Negative for difficulty urinating, dysuria, frequency, hematuria and urgency  Musculoskeletal: Negative for arthralgias, back pain, gait problem and myalgias     Skin: Negative for rash  Neurological: Negative for dizziness, speech difficulty, weakness, numbness and headaches  Hematological: Does not bruise/bleed easily  Psychiatric/Behavioral: Negative for agitation, confusion and hallucinations  Objective:  Vitals:    04/15/22 1115   BP: 138/82   BP Location: Left arm   Patient Position: Sitting   Cuff Size: Standard   Pulse: 72   Temp: 97 9 °F (36 6 °C)   TempSrc: Skin   SpO2: 98%   Weight: 89 4 kg (197 lb)   Height: 5' 5" (1 651 m)     Body mass index is 32 78 kg/m²  Physical Exam  Vitals and nursing note reviewed  Constitutional:       Appearance: She is well-developed  HENT:      Head: Normocephalic and atraumatic  Nose: Nose normal    Eyes:      General: No scleral icterus  Conjunctiva/sclera: Conjunctivae normal       Pupils: Pupils are equal, round, and reactive to light  Neck:      Thyroid: No thyromegaly  Cardiovascular:      Rate and Rhythm: Normal rate and regular rhythm  Pulmonary:      Effort: Pulmonary effort is normal       Breath sounds: Normal breath sounds  No wheezing  Abdominal:      General: Bowel sounds are normal  There is no distension  Palpations: Abdomen is soft  Tenderness: There is abdominal tenderness  There is no guarding or rebound  Musculoskeletal:         General: No tenderness or deformity  Normal range of motion  Cervical back: Normal range of motion and neck supple  Skin:     General: Skin is warm and dry  Findings: No erythema or rash  Neurological:      Mental Status: She is alert and oriented to person, place, and time  Sensory: No sensory deficit  Psychiatric:         Behavior: Behavior normal          Thought Content:  Thought content normal          Judgment: Judgment normal

## 2022-04-22 ENCOUNTER — TELEPHONE (OUTPATIENT)
Dept: GASTROENTEROLOGY | Facility: CLINIC | Age: 72
End: 2022-04-22

## 2022-04-22 NOTE — TELEPHONE ENCOUNTER
Received message from pt asking for a call back to schedule an appt with Dr Alan Farley  Pt is a new pt to us  I returned her call, no answer, no machine  Will call her again in one week to try to schedule   (order in [address] [place] [address])

## 2022-04-26 ENCOUNTER — OFFICE VISIT (OUTPATIENT)
Dept: GASTROENTEROLOGY | Facility: CLINIC | Age: 72
End: 2022-04-26
Payer: COMMERCIAL

## 2022-04-26 VITALS
BODY MASS INDEX: 32.49 KG/M2 | SYSTOLIC BLOOD PRESSURE: 153 MMHG | DIASTOLIC BLOOD PRESSURE: 92 MMHG | HEIGHT: 65 IN | HEART RATE: 72 BPM | WEIGHT: 195 LBS

## 2022-04-26 DIAGNOSIS — Z12.11 COLON CANCER SCREENING: ICD-10-CM

## 2022-04-26 DIAGNOSIS — K21.9 GASTROESOPHAGEAL REFLUX DISEASE, UNSPECIFIED WHETHER ESOPHAGITIS PRESENT: ICD-10-CM

## 2022-04-26 DIAGNOSIS — R10.13 EPIGASTRIC PAIN: Primary | ICD-10-CM

## 2022-04-26 PROCEDURE — 3008F BODY MASS INDEX DOCD: CPT | Performed by: NURSE PRACTITIONER

## 2022-04-26 PROCEDURE — 99203 OFFICE O/P NEW LOW 30 MIN: CPT | Performed by: NURSE PRACTITIONER

## 2022-04-26 PROCEDURE — 1160F RVW MEDS BY RX/DR IN RCRD: CPT | Performed by: NURSE PRACTITIONER

## 2022-04-26 RX ORDER — SUCRALFATE 1 G/1
1 TABLET ORAL 4 TIMES DAILY
Qty: 120 TABLET | Refills: 0 | Status: SHIPPED | OUTPATIENT
Start: 2022-04-26 | End: 2022-04-29 | Stop reason: SDUPTHER

## 2022-04-26 NOTE — PATIENT INSTRUCTIONS
· Continue pantoprazole 40 mg daily before breakfast  · Start Carafate 1 g 4 times daily before meals and at bedtime  · Get right upper quadrant ultrasound completed  · We will schedule upper endoscopy for further evaluation with Dr De Anda      Epigastric Pain   WHAT YOU NEED TO KNOW:   Epigastric pain is felt in the middle of the upper abdomen, between the ribs and the bellybutton  The pain may be mild or severe  Pain may spread from or to another part of your body  Epigastric pain may be a sign of a serious health problem that needs to be treated  DISCHARGE INSTRUCTIONS:   Call 911 for any of the following:   · You have any of the following signs of a heart attack:      ? Squeezing, pressure, or pain in your chest    ? You may  also have any of the following:     § Discomfort or pain in your back, neck, jaw, stomach, or arm    § Shortness of breath    § Nausea or vomiting    § Lightheadedness or a sudden cold sweat    · You have severe pain that radiates to your jaw or back  Return to the emergency department if:   · You have severe pain that starts suddenly and quickly gets worse  · You cannot have a bowel movement and are vomiting  · You vomit or cough up blood  · You see blood in your urine or bowel movement  · You feel drowsy and your breathing is slower than usual     Contact your healthcare provider if:   · You have a fever or chills  · You have yellowing of your skin or the whites of your eyes  · You vomit often or several times in a row  · You lose weight without trying  · You have symptoms for longer than 2 weeks  · You have questions or concerns about your condition or care  Medicines:   · Medicines  may be given to treat pain or stop vomiting  You may also need medicines to reduce or control stomach acid, or treat an infection  · Take your medicine as directed  Contact your healthcare provider if you think your medicine is not helping or if you have side effects  Tell him of her if you are allergic to any medicine  Keep a list of the medicines, vitamins, and herbs you take  Include the amounts, and when and why you take them  Bring the list or the pill bottles to follow-up visits  Carry your medicine list with you in case of an emergency  Follow up with your healthcare provider as directed:  Write down your questions so you remember to ask them during your visits  Manage your symptoms:   · Keep a record of your symptoms  Include when the pain starts, how long it lasts, and if it is sharp or dull  Also include any foods you ate or activities you did before the pain started  Keep track of anything that helped the pain  · Eat a variety of healthy foods  Healthy foods include fruits, vegetables, whole-grain breads, low-fat dairy products, beans, lean meats, and fish  Ask if you need to be on a special diet  Certain foods may cause your pain, such as alcohol or foods that are high in fat  You may need to eat smaller meals and to eat more often than usual     · Drink liquids as directed  Ask how much liquid to drink each day and which liquids are best for you  Do not have drinks that contain alcohol or caffeine  © Copyright [employer] 2022 Information is for End User's use only and may not be sold, redistributed or otherwise used for commercial purposes  All illustrations and images included in CareNotes® are the copyrighted property of A D A M , Inc  or Dorinda Tran   The above information is an  only  It is not intended as medical advice for individual conditions or treatments  Talk to your doctor, nurse or pharmacist before following any medical regimen to see if it is safe and effective for you  GERD (Gastroesophageal Reflux Disease)   WHAT YOU NEED TO KNOW:   Gastroesophageal reflux disease (GERD) is reflux that happens more than 2 times a week for a few weeks  Reflux means acid and food in your stomach back up into your esophagus   GERD can cause other health problems over time if it is not treated  DISCHARGE INSTRUCTIONS:   Call your local emergency number (911 in the [address]) if:   · You have severe chest pain and sudden trouble breathing  Return to the emergency department if:   · You have trouble breathing after you vomit  · You have trouble swallowing, or pain with swallowing  · Your bowel movements are black, bloody, or tarry-looking  · Your vomit looks like coffee grounds or has blood in it  Call your doctor or gastroenterologist if:   · You feel full and cannot burp or vomit  · You vomit large amounts, or you vomit often  · You are losing weight without trying  · Your symptoms get worse or do not improve with treatment  · You have questions or concerns about your condition or care  Medicines:   · Medicines  are used to decrease stomach acid  Medicine may also be used to help your lower esophageal sphincter and stomach contract (tighten) more  · Take your medicine as directed  Contact your healthcare provider if you think your medicine is not helping or if you have side effects  Tell him of her if you are allergic to any medicine  Keep a list of the medicines, vitamins, and herbs you take  Include the amounts, and when and why you take them  Bring the list or the pill bottles to follow-up visits  Carry your medicine list with you in case of an emergency  Manage GERD:       · Do not have foods or drinks that may increase heartburn  These include chocolate, peppermint, fried or fatty foods, drinks that contain caffeine, or carbonated drinks (soda)  Other foods include spicy foods, onions, tomatoes, and tomato-based foods  Do not have foods or drinks that can irritate your esophagus, such as citrus fruits, juices, and alcohol  · Do not eat large meals  When you eat a lot of food at one time, your stomach needs more acid to digest it  Eat 6 small meals each day instead of 3 large ones, and eat slowly  Do not eat meals 2 to 3 hours before bedtime  · Elevate the head of your bed  Place 6-inch blocks under the head of your bed frame  You may also use more than one pillow under your head and shoulders while you sleep  · Maintain a healthy weight  If you are overweight, weight loss may help relieve symptoms of GERD  · Do not smoke  Smoking weakens the lower esophageal sphincter and increases the risk of GERD  Ask your healthcare provider for information if you currently smoke and need help to quit  E-cigarettes or smokeless tobacco still contain nicotine  Talk to your healthcare provider before you use these products  · Do not put pressure on your abdomen  Pressure pushes acid up into your esophagus  Do not wear clothing that is tight around your waist  Do not bend over  Bend at the knees if you need to pick something up  Follow up with your doctor or gastroenterologist as directed:  Write down your questions so you remember to ask them during your visits  © [facility] 2022 Information is for End User's use only and may not be sold, redistributed or otherwise used for commercial purposes  All illustrations and images included in CareNotes® are the copyrighted property of [employer]  or [facility] Daniella Tran   The above information is an  only  It is not intended as medical advice for individual conditions or treatments  Talk to your doctor, nurse or pharmacist before following any medical regimen to see if it is safe and effective for you

## 2022-04-26 NOTE — PROGRESS NOTES
[address] [facility] - Outpatient Consultation  Joy Fung 66 y o  female MRN: [id number]  Encounter: [id number]          ASSESSMENT AND PLAN:      1  Epigastric pain  2  Gastroesophageal reflux disease, unspecified whether esophagitis present  Patient reports history of stomach issues and reflux she was told she had a stomach ulcer and age 4 and started with reflux symptoms in her 35s  She has required multiple different PPIs over the years because they stopped working  About 2 months ago she noticed a gnawing/aching sensation in her epigastric area and increased gurgling  She was switched to pantoprazole 40 mg daily about a month ago and started on anti-reflux diet which has yet to improve symptoms  Epigastric pain is worse with palpation, but she does not notice any worsening or improvement of symptoms with food intake  Denies any NSAID use, black tarry stool, unintended weight loss, early satiety  Differentials include H pylori infection, PUD, biliary etiology or pancreatic etiology  We will schedule EGD for further evaluation     -continue pantoprazole 40 mg daily, start Carafate 1 g q i d   -avoid NSAIDs  -check right upper quadrant ultrasound  -if EGD and ultrasound are unremarkable patient may require further evaluation with CT scan  -     EGD; Future; Expected date: 04/26/2022  -     US right upper quadrant; Future; Expected date: 04/26/2022  -     sucralfate (CARAFATE) 1 g tablet; Take 1 tablet (1 g total) by mouth 4 (four) times a day      3  Colon cancer screening  Negative Cologuard testing in December 2021  ______________________________________________________________________    HPI:  Joy Fung is a 66 y o  female with history of hypothyroidism, small B-cell lymphoma, sacroiliitis, GERD and depression who was referred by her PCP due to history of GERD   She reports she has always had stomach problems and reflux (gastric ulcer at age 4, reflux starting in her 35s), but lately she's been having an 8/10 gnawing/aching sensation in her epigastric area and also gurgling for the last 2 months  Pain is constant, doesn't note any alleviating/aggravating factors  Denies any nausea/vomiting  She feels this when she eats and when she doesn't eat  Her PCP gave her the anti-reflux diet and she has made some changes including decreasing coffee intake, reducing fatty foods, etc  however this hasn't made a difference  She's been on anti reflux medication in the past which worked for so many years and then stopped  The medication has had to be switched several times  She started on Pantoprazole 40 mg daily about a month ago  She denies any reflux, change in bowel habits, abdominal bloating, unintended weight loss, bloody or black colored stool  Denies NSAID use  Denies alcohol use, denies tobacco use, drug use  Denies any history of abdominal surgeries, she's had multiple joint replacements  Denies family history of esophageal cancer, stomach cancer, colon cancer  She's had a colonoscopy in the past, but has never had an EGD  She's had colon polyps in the past, but then not on subsequent colonoscopys (most recent approx 10 y ago)  She had cologuard testing in Dec 2021 which was negative  REVIEW OF SYSTEMS:    CONSTITUTIONAL: Denies any fever, chills, rigors, and weight loss  HEENT: No earache or tinnitus  CARDIOVASCULAR: No chest pain or palpitations  RESPIRATORY: Denies any cough, hemoptysis, shortness of breath or dyspnea on exertion  GASTROINTESTINAL: As noted in the History of Present Illness  GENITOURINARY: Denies any hematuria or dysuria  NEUROLOGIC: No dizziness or vertigo  MUSCULOSKELETAL: Denies any joint swellings  SKIN: Denies skin rashes or itching  ENDOCRINE: Denies excessive thirst  Denies intolerance to heat or cold  PSYCHOSOCIAL: Denies depression or anxiety  Denies any recent memory loss         Historical Information   Past Medical History:   Diagnosis Date    H/O bilateral hip replacements      Past Surgical History:   Procedure Laterality Date    HIP SURGERY      JOINT REPLACEMENT      KNEE SURGERY       Social History   Social History     Substance and Sexual Activity   Alcohol Use Yes    Comment: wine     Social History     Substance and Sexual Activity   Drug Use Never     Social History     Tobacco Use   Smoking Status Never Smoker   Smokeless Tobacco Never Used     History reviewed  No pertinent family history  Meds/Allergies       Current Outpatient Medications:     divalproex sodium (DEPAKOTE) 500 mg EC tablet    levothyroxine 75 mcg tablet    multivitamin (THERAGRAN) TABS    pantoprazole (PROTONIX) 40 mg tablet    traZODone (DESYREL) 50 mg tablet    venlafaxine (EFFEXOR-XR) 150 mg 24 hr capsule    venlafaxine (EFFEXOR-XR) 75 mg 24 hr capsule    sucralfate (CARAFATE) 1 g tablet    Allergies   Allergen Reactions    Clindamycin Hives           Objective     Blood pressure 153/92, pulse 72, height 5' 5" (1 651 m), weight 88 5 kg (195 lb)  Body mass index is 32 45 kg/m²  PHYSICAL EXAM:      General Appearance:   Alert, cooperative, no distress   HEENT:   Normocephalic, atraumatic, anicteric  Neck:  Supple, symmetrical, trachea midline   Lungs:   Clear to auscultation bilaterally; no rales, rhonchi or wheezing; respirations unlabored    Heart[de-identified]   Regular rate and rhythm; no murmur  Abdomen:   Soft, epigastric area tender, non-distended; normal bowel sounds; no masses, no organomegaly    Genitalia:   Deferred    Rectal:   Deferred    Extremities:  No cyanosis, clubbing or edema    Skin:  No jaundice, rashes, or lesions    Lymph nodes:  No palpable cervical lymphadenopathy        Lab Results:   No visits with results within 1 Day(s) from this visit     Latest known visit with results is:   Appointment on 02/02/2022   Component Date Value    TSH 3RD GENERATON 02/02/2022 2 120     Hepatitis C Ab 02/02/2022 Non-reactive Radiology Results:   No results found

## 2022-04-29 ENCOUNTER — TELEPHONE (OUTPATIENT)
Dept: FAMILY MEDICINE CLINIC | Facility: CLINIC | Age: 72
End: 2022-04-29

## 2022-04-29 DIAGNOSIS — Z79.2 PROPHYLACTIC ANTIBIOTIC: Primary | ICD-10-CM

## 2022-04-29 DIAGNOSIS — R10.13 EPIGASTRIC PAIN: Primary | ICD-10-CM

## 2022-04-29 RX ORDER — SUCRALFATE 1 G/1
1 TABLET ORAL 4 TIMES DAILY
Qty: 360 TABLET | Refills: 0 | Status: SHIPPED | OUTPATIENT
Start: 2022-04-29 | End: 2022-05-13 | Stop reason: SDUPTHER

## 2022-04-29 RX ORDER — AMOXICILLIN 500 MG/1
TABLET, FILM COATED ORAL
Qty: 4 TABLET | Refills: 3 | Status: SHIPPED | OUTPATIENT
Start: 2022-04-29 | End: 2022-04-29

## 2022-04-29 NOTE — TELEPHONE ENCOUNTER
Pt  stopped in , requested antibiotics before her Dental appnt  at [facility] in [place] on Monday at 11:30am, she would like to use [facility] in [place]

## 2022-05-09 ENCOUNTER — TELEPHONE (OUTPATIENT)
Dept: GASTROENTEROLOGY | Facility: CLINIC | Age: 72
End: 2022-05-09

## 2022-05-09 DIAGNOSIS — F31.31 BIPOLAR AFFECTIVE DISORDER, CURRENTLY DEPRESSED, MILD (HCC): ICD-10-CM

## 2022-05-09 RX ORDER — VENLAFAXINE HYDROCHLORIDE 75 MG/1
75 CAPSULE, EXTENDED RELEASE ORAL
Qty: 90 CAPSULE | Refills: 1 | Status: SHIPPED | OUTPATIENT
Start: 2022-05-09

## 2022-05-09 NOTE — TELEPHONE ENCOUNTER
Patients GI provider:  Kdoy    Number to return call: [phone]    Reason for call: Pt calling to schedule her EGD       Scheduled procedure/appointment date if applicable: Apt/procedure NA

## 2022-05-12 ENCOUNTER — TELEPHONE (OUTPATIENT)
Dept: GASTROENTEROLOGY | Facility: CLINIC | Age: 72
End: 2022-05-12

## 2022-05-12 NOTE — TELEPHONE ENCOUNTER
Spoke to pt confirming her EGD with Dr Karen Dumont on 5/20/22 at [facility] and informed that [facility] would be calling her the day prior with arrival time  I informed that pt would need a  the day of the procedure due to being under sedation and to be npo after midnight the day prior  Answered pt's questions

## 2022-05-13 ENCOUNTER — TELEPHONE (OUTPATIENT)
Dept: GASTROENTEROLOGY | Facility: AMBULARY SURGERY CENTER | Age: 72
End: 2022-05-13

## 2022-05-13 DIAGNOSIS — R10.13 EPIGASTRIC PAIN: ICD-10-CM

## 2022-05-13 RX ORDER — SUCRALFATE 1 G/1
1 TABLET ORAL 4 TIMES DAILY
Qty: 360 TABLET | Refills: 0 | Status: SHIPPED | OUTPATIENT
Start: 2022-05-13 | End: 2022-05-20 | Stop reason: HOSPADM

## 2022-05-13 NOTE — TELEPHONE ENCOUNTER
Patients GI provider:  Dr Melissa Ragsdale    Number to return call: [phone]     Reason for call: Pt called stated a prescription for her pain was not sent to the pharmacy  She would like to speak with someone to discuss       Scheduled procedure/appointment date if applicable: Apt/procedure 5/20/22

## 2022-05-16 ENCOUNTER — HOSPITAL ENCOUNTER (OUTPATIENT)
Dept: RADIOLOGY | Facility: MEDICAL CENTER | Age: 72
Discharge: HOME | End: 2022-05-16
Payer: COMMERCIAL

## 2022-05-16 VITALS — HEIGHT: 65 IN | WEIGHT: 195 LBS | BODY MASS INDEX: 32.49 KG/M2

## 2022-05-16 DIAGNOSIS — Z12.31 ENCOUNTER FOR SCREENING MAMMOGRAM FOR BREAST CANCER: ICD-10-CM

## 2022-05-16 PROCEDURE — 77063 BREAST TOMOSYNTHESIS BI: CPT

## 2022-05-16 PROCEDURE — 77067 SCR MAMMO BI INCL CAD: CPT

## 2022-05-20 ENCOUNTER — HOSPITAL ENCOUNTER (OUTPATIENT)
Dept: GASTROENTEROLOGY | Facility: HOSPITAL | Age: 72
Setting detail: OUTPATIENT SURGERY
Discharge: HOME | End: 2022-05-20
Payer: COMMERCIAL

## 2022-05-20 ENCOUNTER — ANESTHESIA (OUTPATIENT)
Dept: GASTROENTEROLOGY | Facility: HOSPITAL | Age: 72
End: 2022-05-20

## 2022-05-20 ENCOUNTER — ANESTHESIA EVENT (OUTPATIENT)
Dept: GASTROENTEROLOGY | Facility: HOSPITAL | Age: 72
End: 2022-05-20

## 2022-05-20 VITALS
OXYGEN SATURATION: 99 % | BODY MASS INDEX: 32.69 KG/M2 | SYSTOLIC BLOOD PRESSURE: 142 MMHG | DIASTOLIC BLOOD PRESSURE: 80 MMHG | TEMPERATURE: 97 F | HEART RATE: 56 BPM | HEIGHT: 65 IN | RESPIRATION RATE: 13 BRPM | WEIGHT: 196.2 LBS

## 2022-05-20 DIAGNOSIS — R10.10 PAIN OF UPPER ABDOMEN: ICD-10-CM

## 2022-05-20 DIAGNOSIS — K21.9 GASTROESOPHAGEAL REFLUX DISEASE WITHOUT ESOPHAGITIS: Primary | ICD-10-CM

## 2022-05-20 DIAGNOSIS — K21.9 LARYNGOPHARYNGEAL REFLUX: ICD-10-CM

## 2022-05-20 DIAGNOSIS — K21.9 GASTROESOPHAGEAL REFLUX DISEASE, UNSPECIFIED WHETHER ESOPHAGITIS PRESENT: ICD-10-CM

## 2022-05-20 DIAGNOSIS — R10.13 EPIGASTRIC PAIN: ICD-10-CM

## 2022-05-20 PROCEDURE — 88305 TISSUE EXAM BY PATHOLOGIST: CPT | Performed by: PATHOLOGY

## 2022-05-20 PROCEDURE — 88313 SPECIAL STAINS GROUP 2: CPT | Performed by: PATHOLOGY

## 2022-05-20 PROCEDURE — 43239 EGD BIOPSY SINGLE/MULTIPLE: CPT | Performed by: INTERNAL MEDICINE

## 2022-05-20 RX ORDER — PROPOFOL 10 MG/ML
INJECTION, EMULSION INTRAVENOUS AS NEEDED
Status: DISCONTINUED | OUTPATIENT
Start: 2022-05-20 | End: 2022-05-20

## 2022-05-20 RX ORDER — SODIUM CHLORIDE, SODIUM LACTATE, POTASSIUM CHLORIDE, CALCIUM CHLORIDE 600; 310; 30; 20 MG/100ML; MG/100ML; MG/100ML; MG/100ML
125 INJECTION, SOLUTION INTRAVENOUS CONTINUOUS
Status: CANCELLED | OUTPATIENT
Start: 2022-05-20

## 2022-05-20 RX ORDER — LIDOCAINE HYDROCHLORIDE 20 MG/ML
INJECTION, SOLUTION EPIDURAL; INFILTRATION; INTRACAUDAL; PERINEURAL AS NEEDED
Status: DISCONTINUED | OUTPATIENT
Start: 2022-05-20 | End: 2022-05-20

## 2022-05-20 RX ORDER — SUCRALFATE ORAL 1 G/10ML
1 SUSPENSION ORAL 4 TIMES DAILY
Qty: 414 ML | Refills: 1 | Status: SHIPPED | OUTPATIENT
Start: 2022-05-20

## 2022-05-20 RX ORDER — SODIUM CHLORIDE, SODIUM LACTATE, POTASSIUM CHLORIDE, CALCIUM CHLORIDE 600; 310; 30; 20 MG/100ML; MG/100ML; MG/100ML; MG/100ML
INJECTION, SOLUTION INTRAVENOUS CONTINUOUS PRN
Status: DISCONTINUED | OUTPATIENT
Start: 2022-05-20 | End: 2022-05-20

## 2022-05-20 RX ORDER — SODIUM CHLORIDE, SODIUM LACTATE, POTASSIUM CHLORIDE, CALCIUM CHLORIDE 600; 310; 30; 20 MG/100ML; MG/100ML; MG/100ML; MG/100ML
125 INJECTION, SOLUTION INTRAVENOUS CONTINUOUS
Status: DISCONTINUED | OUTPATIENT
Start: 2022-05-20 | End: 2022-05-24 | Stop reason: HOSPADM

## 2022-05-20 RX ADMIN — LIDOCAINE HYDROCHLORIDE 100 MG: 20 INJECTION, SOLUTION EPIDURAL; INFILTRATION; INTRACAUDAL; PERINEURAL at 12:16

## 2022-05-20 RX ADMIN — SODIUM CHLORIDE, SODIUM LACTATE, POTASSIUM CHLORIDE, AND CALCIUM CHLORIDE 125 ML/HR: .6; .31; .03; .02 INJECTION, SOLUTION INTRAVENOUS at 11:58

## 2022-05-20 RX ADMIN — SODIUM CHLORIDE, SODIUM LACTATE, POTASSIUM CHLORIDE, AND CALCIUM CHLORIDE: .6; .31; .03; .02 INJECTION, SOLUTION INTRAVENOUS at 12:07

## 2022-05-20 RX ADMIN — PROPOFOL 150 MG: 10 INJECTION, EMULSION INTRAVENOUS at 12:16

## 2022-05-20 RX ADMIN — PROPOFOL 50 MG: 10 INJECTION, EMULSION INTRAVENOUS at 12:23

## 2022-05-20 NOTE — ANESTHESIA PREPROCEDURE EVALUATION
Procedure:  EGD    Relevant Problems   ENDO   (+) Hypothyroidism      GI/HEPATIC   (+) Gastroesophageal reflux disease without esophagitis      HEMATOLOGY   (+) Small B-cell lymphoma (HCC)   (+) Thrombocytopenia (HCC)      MUSCULOSKELETAL   (+) Sacroiliitis, not elsewhere classified (HCC)      NEURO/PSYCH   (+) Bipolar affective disorder, currently depressed, mild (HCC)   (+) Current moderate episode of major depressive disorder without prior episode (HCC)   (+) History of pulmonary embolism        Physical Exam    Airway    Mallampati score: II  TM Distance: >3 FB  Neck ROM: full     Dental   No notable dental hx     Cardiovascular      Pulmonary      Other Findings        Anesthesia Plan  ASA Score- 2     Anesthesia Type- IV sedation with anesthesia with ASA Monitors  Additional Monitors:   Airway Plan:           Plan Factors-Exercise tolerance (METS): >4 METS  Chart reviewed  Patient summary reviewed  Patient is not a current smoker  Induction- intravenous  Postoperative Plan-     Informed Consent- Anesthetic plan and risks discussed with patient  I personally reviewed this patient with the CRNA  Discussed and agreed on the Anesthesia Plan with the CRNA  Maribel Lamas

## 2022-05-20 NOTE — ANESTHESIA POSTPROCEDURE EVALUATION
Post-Op Assessment Note    CV Status:  Stable  Pain Score: 0    Pain management: adequate     Mental Status:  Alert and awake   Hydration Status:  Euvolemic   PONV Controlled:  Controlled   Airway Patency:  Patent      Post Op Vitals Reviewed: Yes      Staff: Anesthesiologist, CRNA         No complications documented      /70 (05/20/22 1229)    Temp      Pulse 57 (05/20/22 1229)   Resp 14 (05/20/22 1229)    SpO2 98 % (05/20/22 1229)

## 2022-05-20 NOTE — H&P
History and Physical - [facility]  Valeriy Varela 66 y o  female MRN: [id number]                  HPI: Valeriy Varela is a 66y o  year old female who presents for upper endoscopy for epigastric pain and reflux  REVIEW OF SYSTEMS: Per the HPI, and otherwise unremarkable      Historical Information   Past Medical History:   Diagnosis Date    Anxiety     Disease of thyroid gland     GERD (gastroesophageal reflux disease)     H/O bilateral hip replacements      Past Surgical History:   Procedure Laterality Date    BACK SURGERY      lumbar spine    HIP SURGERY Bilateral     JOINT REPLACEMENT      KNEE SURGERY Right     TOTAL SHOULDER REPLACEMENT Right      Social History   Social History     Substance and Sexual Activity   Alcohol Use Yes    Comment: wine     Social History     Substance and Sexual Activity   Drug Use Never     Social History     Tobacco Use   Smoking Status Never Smoker   Smokeless Tobacco Never Used     Family History   Problem Relation Age of Onset    Lymphoma Mother     No Known Problems Father     No Known Problems Daughter     No Known Problems Daughter     No Known Problems Maternal Grandmother     No Known Problems Maternal Grandfather     No Known Problems Paternal Grandmother     No Known Problems Paternal Grandfather     No Known Problems Brother        Meds/Allergies       Current Outpatient Medications:     divalproex sodium (DEPAKOTE) 500 mg EC tablet    levothyroxine 75 mcg tablet    multivitamin (THERAGRAN) TABS    pantoprazole (PROTONIX) 40 mg tablet    traZODone (DESYREL) 50 mg tablet    venlafaxine (EFFEXOR-XR) 150 mg 24 hr capsule    venlafaxine (EFFEXOR-XR) 75 mg 24 hr capsule    sucralfate (CARAFATE) 1 g tablet    Current Facility-Administered Medications:     lactated ringers infusion, 125 mL/hr, Intravenous, Continuous, 125 mL/hr at 05/20/22 1158    Allergies   Allergen Reactions    Clindamycin Hives       Objective     BP (!) 171/79   Pulse 60   Temp 98 °F (36 7 °C) (Temporal)   Resp 20   Ht 5' 5" (1 651 m)   Wt 89 kg (196 lb 3 2 oz)   SpO2 98%   BMI 32 65 kg/m²       PHYSICAL EXAM    Gen: NAD  Head: NCAT  CV: RRR  CHEST: Clear  ABD: soft, NT/ND  EXT: no edema      ASSESSMENT/PLAN:  This is a 66y o  year old female here for upper endoscopy, and she is stable and optimized for her procedure

## 2022-05-23 ENCOUNTER — TELEPHONE (OUTPATIENT)
Dept: GASTROENTEROLOGY | Facility: CLINIC | Age: 72
End: 2022-05-23

## 2022-05-23 NOTE — TELEPHONE ENCOUNTER
Patients GI provider:  Dr Vannesa Joe    Number to return call: ([phone])  Reason for call: Pt calling to make an appointment  Had question about the sucralfate Dr Vannesa Joe put her on post EGD  After she finishes the liquid, Joe Stock had prescribed pill form prior to EGD  Just came in the mail, should she continue with the pills after she finishes the liquid? Please call her back to let her know  Thank you!     Scheduled procedure/appointment date if applicable: Apt/procedure 08/23/22

## 2022-05-23 NOTE — TELEPHONE ENCOUNTER
Please inform patient she can continue to take pills after liquid is completed  Tell patient dissolved pill in 10 mL of water then swallow  Dr Merlinda Noble documented after the EGD  done 5/20 that she should continue Carafate and PPI    Thank you

## 2022-06-01 ENCOUNTER — TELEPHONE (OUTPATIENT)
Dept: GASTROENTEROLOGY | Facility: CLINIC | Age: 72
End: 2022-06-01

## 2022-06-02 ENCOUNTER — OFFICE VISIT (OUTPATIENT)
Dept: FAMILY MEDICINE CLINIC | Facility: CLINIC | Age: 72
End: 2022-06-02
Payer: COMMERCIAL

## 2022-06-02 VITALS
DIASTOLIC BLOOD PRESSURE: 88 MMHG | WEIGHT: 199 LBS | HEIGHT: 65 IN | BODY MASS INDEX: 33.15 KG/M2 | HEART RATE: 67 BPM | RESPIRATION RATE: 16 BRPM | OXYGEN SATURATION: 98 % | TEMPERATURE: 97.9 F | SYSTOLIC BLOOD PRESSURE: 148 MMHG

## 2022-06-02 DIAGNOSIS — E03.4 HYPOTHYROIDISM DUE TO ACQUIRED ATROPHY OF THYROID: ICD-10-CM

## 2022-06-02 DIAGNOSIS — M54.2 NECK PAIN: ICD-10-CM

## 2022-06-02 DIAGNOSIS — F32.1 CURRENT MODERATE EPISODE OF MAJOR DEPRESSIVE DISORDER WITHOUT PRIOR EPISODE (HCC): ICD-10-CM

## 2022-06-02 DIAGNOSIS — Z86.711 HISTORY OF PULMONARY EMBOLISM: ICD-10-CM

## 2022-06-02 DIAGNOSIS — C83.00 SMALL B-CELL LYMPHOMA, UNSPECIFIED BODY REGION (HCC): ICD-10-CM

## 2022-06-02 DIAGNOSIS — K21.9 GASTROESOPHAGEAL REFLUX DISEASE WITHOUT ESOPHAGITIS: Primary | ICD-10-CM

## 2022-06-02 PROCEDURE — 1036F TOBACCO NON-USER: CPT | Performed by: FAMILY MEDICINE

## 2022-06-02 PROCEDURE — 1160F RVW MEDS BY RX/DR IN RCRD: CPT | Performed by: FAMILY MEDICINE

## 2022-06-02 PROCEDURE — 3008F BODY MASS INDEX DOCD: CPT | Performed by: FAMILY MEDICINE

## 2022-06-02 PROCEDURE — 99213 OFFICE O/P EST LOW 20 MIN: CPT | Performed by: FAMILY MEDICINE

## 2022-06-02 RX ORDER — NABUMETONE 750 MG/1
750 TABLET, FILM COATED ORAL 2 TIMES DAILY PRN
Qty: 20 TABLET | Refills: 1 | Status: SHIPPED | OUTPATIENT
Start: 2022-06-02

## 2022-06-02 RX ORDER — CYCLOBENZAPRINE HCL 10 MG
10 TABLET ORAL 3 TIMES DAILY PRN
Qty: 30 TABLET | Refills: 0 | Status: SHIPPED | OUTPATIENT
Start: 2022-06-02

## 2022-06-02 NOTE — PROGRESS NOTES
Assessment/Plan:         Problem List Items Addressed This Visit        Digestive    Gastroesophageal reflux disease without esophagitis - Primary     Patient to continue with present therapy and decrease caffeine, avoid ETOH and smoking to decrease acid production  Pt should also cease eating prior to bedtime and avoid excessive fluid intake prior to sleep  May use antacids as needed for breakthrough GERD  All pateint questions answered today about this condition  Endocrine    Hypothyroidism     Patient to continue current dose of thyroid medicine and recheck TSH in 6 months              Other    Current moderate episode of major depressive disorder without prior episode [facility])     Patient to continue utilizing medical therapy as well and counseling sources as applicable for condition  If  suicidal thought or fear of suicide to contact 911 and seek help immediately  Meds reviewed and patient questions answered today           History of pulmonary embolism     Patient is stable  and will continue present plan of care and reassess at next routine visit  All questions about this problem from patient were answered today  Small B-cell lymphoma ([facility] [address] )    Relevant Orders    CBC and differential      Other Visit Diagnoses     Neck pain        Relevant Medications    cyclobenzaprine (FLEXERIL) 10 mg tablet    nabumetone (RELAFEN) 750 mg tablet    Other Relevant Orders    Ambulatory Referral to Physical Therapy        Depression Screening and Follow-up Plan: Patient assessed for underlying major depression  Brief counseling provided and recommend additional follow-up/re-evaluation next office visit  Patient advised to follow-up with PCP for further management  Falls Plan of Care: balance, strength, and gait training instructions were provided  Subjective:      Patient ID: Albina Linda is a 66 y o  female      This 67-year-old female here today for checkup on onset of neck pain on the left side   Patient states about 2 weeks ago she was doing some gardening outside and felt as though she may have pulled something in her neck she had pain in the left sternocleidomastoid area and is now spreading into her scalene area and back into the posterior shoulder  Patient has no shoulder involvement but is getting some irritation with the muscles there  Patient has history of be smell B-cell lymphoma on the right side of her chest just can not see about checking his CBC to make sure there is nothing there she has no adenopathy in the neck upon exam   She does have some splinting with the way she old her head when she sits and when she rotates left and right she does have irritation to the sternocleidomastoid area  The following portions of the patient's history were reviewed and updated as appropriate:   Past Medical History:  She has a past medical history of Anxiety, Disease of thyroid gland, GERD (gastroesophageal reflux disease), and H/O bilateral hip replacements  ,  _______________________________________________________________________  Medical Problems:  does not have any pertinent problems on file ,  _______________________________________________________________________  Past Surgical History:   has a past surgical history that includes Hip surgery (Bilateral); Knee surgery (Right); Joint replacement; Total shoulder replacement (Right); and Back surgery  ,  _______________________________________________________________________  Family History:  family history includes Lymphoma in her mother; No Known Problems in her brother, daughter, daughter, father, maternal grandfather, maternal grandmother, paternal grandfather, and paternal grandmother ,  _______________________________________________________________________  Social History:   reports that she has never smoked  She has never used smokeless tobacco  She reports current alcohol use   She reports that she does not use drugs ,  _______________________________________________________________________  Allergies:  is allergic to clindamycin     _______________________________________________________________________  Current Outpatient Medications   Medication Sig Dispense Refill    cyclobenzaprine (FLEXERIL) 10 mg tablet Take 1 tablet (10 mg total) by mouth 3 (three) times a day as needed for muscle spasms 30 tablet 0    divalproex sodium (DEPAKOTE) 500 mg EC tablet TAKE ONE TABLET BY MOUTH EVERY MORNING AND ONE TABLET EVERY NIGHT      levothyroxine 75 mcg tablet Take 1 tablet (75 mcg total) by mouth daily in the early morning 90 tablet 1    multivitamin (THERAGRAN) TABS Take 1 tablet by mouth daily      nabumetone (RELAFEN) 750 mg tablet Take 1 tablet (750 mg total) by mouth 2 (two) times a day as needed for mild pain 20 tablet 1    pantoprazole (PROTONIX) 40 mg tablet Take 1 tablet (40 mg total) by mouth daily before breakfast 90 tablet 1    sucralfate (CARAFATE) 1 g/10 mL suspension Take 10 mL (1 g total) by mouth in the morning and 10 mL (1 g total) at noon and 10 mL (1 g total) in the evening and 10 mL (1 g total) before bedtime  414 mL 1    traZODone (DESYREL) 50 mg tablet Take 1 tablet (50 mg total) by mouth daily at bedtime as needed for sleep 90 tablet 1    venlafaxine (EFFEXOR-XR) 150 mg 24 hr capsule TAKE ONE CAPSULE BY MOUTH EVERY DAY 90 capsule 0    venlafaxine (EFFEXOR-XR) 75 mg 24 hr capsule Take 1 capsule (75 mg total) by mouth daily with breakfast 90 capsule 1     No current facility-administered medications for this visit      _______________________________________________________________________  Review of Systems      Objective:  Vitals:    06/02/22 1704   BP: 148/88   BP Location: Left arm   Patient Position: Sitting   Cuff Size: Large   Pulse: 67   Resp: 16   Temp: 97 9 °F (36 6 °C)   SpO2: 98%   Weight: 90 3 kg (199 lb)   Height: 5' 5" (1 651 m)     Body mass index is 33 12 kg/m²       Physical Exam

## 2022-06-07 ENCOUNTER — EVALUATION (OUTPATIENT)
Dept: PHYSICAL THERAPY | Facility: MEDICAL CENTER | Age: 72
End: 2022-06-07
Payer: COMMERCIAL

## 2022-06-07 DIAGNOSIS — M54.2 NECK PAIN: Primary | ICD-10-CM

## 2022-06-07 PROCEDURE — 97161 PT EVAL LOW COMPLEX 20 MIN: CPT

## 2022-06-07 PROCEDURE — 97110 THERAPEUTIC EXERCISES: CPT

## 2022-06-07 NOTE — PROGRESS NOTES
PT Evaluation     Today's date: 2022  Patient name: Edmar Garcia  : 1950  MRN: [id number]  Referring provider: Ayse Moreno MD  Dx:   Encounter Diagnosis     ICD-10-CM    1  Neck pain  M54 2 Ambulatory Referral to Physical Therapy       Start Time: 930  Stop Time: 1009  Total time in clinic (min): 39 minutes    Assessment  Assessment details: Chance Castillo is a 69y/o female who presents to OP PT with a a referral from Dr Heide Seo with a medical diagnosis of neck pain  Upon examination pt presents with decreased cervical ROM in all planes, decreased cervical and postural strength, joint and soft tissue restrictions and increased pain  Previously mentioned deficits have impaired patients ability to perform ADLs, recreational activities and house hold duties  Pt was educated on examination findings, diagnosis, prognosis, home exercise program to complete  Pt states understanding and consents to skilled PT services  Pt is a good candidate for skilled PT services  Positive prognositic indicators include desire to improve and active lifestyle  Negative prognostic indicators include PMH of anxiety, GERD  Pt would benefit from skilled PT services to address functional deficits to return to PLOF       '  Impairments: abnormal muscle firing, abnormal muscle tone, abnormal or restricted ROM, abnormal movement, activity intolerance, impaired physical strength, lacks appropriate home exercise program, pain with function, poor posture  and poor body mechanics    Symptom irritability: highUnderstanding of Dx/Px/POC: good   Prognosis: good    Goals  STG  2-6 weeks    1  Patient will improve ROM by 25%    2  Patient will improve periscapular strength to 4+/5 for improved posture and ability to perform ADLs such as grooming and bathing  3    Pt will report a 2-3 point decrease on NPRS for improved tolerance to ADLs, recreational activities and work duties  LTG 6 weeks to discharge    1  Patient will be independent with HEP for continued progress  2  Patient will attain FOTO score of anticipated or greater at time of discharge  3  Patient will have full pain free cervical rotation to safely check blind spots while driving  4  Patient will improve deep neck flexor endurance strength by 17 seconds            Plan  Patient would benefit from: PT eval and skilled physical therapy  Referral necessary: No  Planned modality interventions: traction, TENS, cryotherapy, thermotherapy: hydrocollator packs, high voltage pulsed current: pain management, high voltage pulsed current: spasm management and biofeedback  Planned therapy interventions: ADL retraining, ADL training, balance, manual therapy, massage, motor coordination training, neuromuscular re-education, patient education, postural training, strengthening, stretching, therapeutic activities, therapeutic exercise, flexibility, functional ROM exercises, abdominal trunk stabilization, activity modification, joint mobilization, Uribe taping, orthotic fitting/training, orthotic management and training, prosthetic fitting/training, gait training, graded activity, graded exercise, graded motor and home exercise program  Other planned therapy interventions: IASTM, negative pressure IASTM  Frequency: 2x week  Plan of Care beginning date: 6/7/2022  Plan of Care expiration date: 8/30/2022  Treatment plan discussed with: patient        Subjective Evaluation    History of Present Illness  Mechanism of injury: Subjective: Patient states a 2 5 week history of left sided neck pain  Initially started when working in her garden  She attempted to use heat and warm compress with minimal improvement in symptoms  Patient was seen by referring MD who prescribed muscle relaxant  Patient states she has been able to sleep now with use of medication  She states looking up, rotation and lifting objects is painful   Denies numbness/tingling, difficulty swallowing, headaches, dizziness, n/v      Pain  Type: throbbing  Current: 8/10  Best: 7/10  Worst: 8/10  Alleviates: medication  Aggravates: movement, rotation, extension    Occupation: retired    Imaging:  n/a    PMH: BMI, LBP, GERD, bilateral NICA    Previous Surgeries: low back, bilateral hip replacement, R TSA    Previous Physical Activity: daily walks, gardening    Current Medications: see file    NICKO: n/a unable to states NICKO    Surgery Date: n/a     MD: Dr Prince Gee    Patient Goals: be out of pain, work in yard without pain               Objective     Concurrent Complaints  Negative for night pain, disturbed sleep, dizziness, faints, headaches, nausea/motion sickness, tinnitus, trouble swallowing, difficulty breathing, shortness of breath, respiratory pain and visual change    Postural Observations  Seated posture: poor  Standing posture: poor  Correction of posture: has no consistent effect        Palpation   Left   Hypertonic in the cervical paraspinals and upper trapezius  Tenderness of the cervical paraspinals, levator scapulae, scalenes, sternocleidomastoid, suboccipitals and upper trapezius       Active Range of Motion   Cervical/Thoracic Spine       Cervical    Flexion:  WFL  Extension: 10 degrees     with pain  Left lateral flexion: 25 degrees     with pain  Right lateral flexion: 25 degrees     with pain  Left rotation: 25 degrees with pain  Right rotation: 40 degrees             Joint Play   1st rib comments: L 1st rib hypomobile and painful, slightly elevated compared to R    Strength/Myotome Testing   Cervical Spine     Left   Neck lateral flexion (C3): 2+    Right   Neck lateral flexion (C3): 2+    Left Shoulder     Planes of Motion   Flexion: 3+   Abduction: 3+   External rotation at 0°: 3+   Internal rotation at 0°: 3+     Right Shoulder     Planes of Motion   Flexion: 3+   Abduction: 3+   External rotation at 0°: 3+   Internal rotation at 0°: 3+     Left Elbow   Flexion: 3+    Right Elbow   Flexion: 3+  Extension: 3+  Neuro Exam:     Headaches   Patient reports headaches: No               Precautions: GERD, BMI      Manuals 6/7/2022              IASTM             STM SOR, Scalene,                                       Neuro Re-Ed             Chin Tuck             Scap Retraction HEP            Horizontal Abduction                                                                 Ther Ex             UT Stretch HEP            LS Stretch HEP            Cervical Arom                                                                              Ther Activity                                       Gait Training                                       Modalities

## 2022-06-09 DIAGNOSIS — F31.31 BIPOLAR AFFECTIVE DISORDER, CURRENTLY DEPRESSED, MILD (HCC): Primary | ICD-10-CM

## 2022-06-09 RX ORDER — DIVALPROEX SODIUM 500 MG/1
500 TABLET, DELAYED RELEASE ORAL EVERY 12 HOURS SCHEDULED
Qty: 180 TABLET | Refills: 0 | Status: SHIPPED | OUTPATIENT
Start: 2022-06-09

## 2022-06-10 ENCOUNTER — OFFICE VISIT (OUTPATIENT)
Dept: PHYSICAL THERAPY | Facility: MEDICAL CENTER | Age: 72
End: 2022-06-10
Payer: COMMERCIAL

## 2022-06-10 DIAGNOSIS — M54.2 NECK PAIN: Primary | ICD-10-CM

## 2022-06-10 PROCEDURE — 97112 NEUROMUSCULAR REEDUCATION: CPT

## 2022-06-10 PROCEDURE — 97110 THERAPEUTIC EXERCISES: CPT

## 2022-06-10 PROCEDURE — 97140 MANUAL THERAPY 1/> REGIONS: CPT

## 2022-06-14 ENCOUNTER — OFFICE VISIT (OUTPATIENT)
Dept: PHYSICAL THERAPY | Facility: MEDICAL CENTER | Age: 72
End: 2022-06-14
Payer: COMMERCIAL

## 2022-06-14 DIAGNOSIS — M54.2 NECK PAIN: Primary | ICD-10-CM

## 2022-06-14 PROCEDURE — 97140 MANUAL THERAPY 1/> REGIONS: CPT

## 2022-06-14 PROCEDURE — 97112 NEUROMUSCULAR REEDUCATION: CPT

## 2022-06-14 NOTE — PROGRESS NOTES
Daily Note     Today's date: 2022  Patient name: Jennifer Mclean  : 1950  MRN: [id number]  Referring provider: Marilee Wells MD  Dx:   Encounter Diagnosis     ICD-10-CM    1  Neck pain  M54 2        Start Time: 930  Stop Time: 101  Total time in clinic (min): 41 minutes    Subjective: Pt states continued progressed of presenting symptoms  Objective: See treatment diary below      Assessment: Tolerated treatment well  Patient demonstrated fatigue post treatment, exhibited good technique with therapeutic exercises and would benefit from continued PT      Plan: Continue per plan of care  Progress treatment as tolerated         Precautions: GERD, BMI    PT 1:1 [id number]  Manuals 2022   6/10/2022   2022            IASTM             STM SOR, Scalene,  SOR, scalene, UT, paraspinals SOR, scalene, UT, paraspinals          JT Mob  1st Rib 1st rib                       Neuro Re-Ed             Chin Tuck  3" 10x 3" 15x          Scap Retraction HEP 3" 10x 3" 15x          Horizontal Abduction  YTB 3" 10x YTB 3" 15x          TB Row  RTB 3" 2x10 RTB 3" 2x10          GH Ext  RTB 3" 2x10 RTB 3" 2x10                                    Ther Ex             UT Stretch HEP 30" 3x ea 30" 3x ea          LS Stretch HEP 30" 3x ea 30" 3x ea          Cervical Arom  rotation bilaterally 2x10 rotation bilat 2x10, with STM of left upper trap          Wall Slide   2x10                                                              Ther Activity                                       Gait Training                                       Modalities

## 2022-06-17 ENCOUNTER — OFFICE VISIT (OUTPATIENT)
Dept: PHYSICAL THERAPY | Facility: MEDICAL CENTER | Age: 72
End: 2022-06-17
Payer: COMMERCIAL

## 2022-06-17 DIAGNOSIS — M54.2 NECK PAIN: Primary | ICD-10-CM

## 2022-06-17 PROCEDURE — 97140 MANUAL THERAPY 1/> REGIONS: CPT

## 2022-06-17 PROCEDURE — 97110 THERAPEUTIC EXERCISES: CPT

## 2022-06-17 NOTE — PROGRESS NOTES
Daily Note     Today's date: 2022  Patient name: Butch Tomas  : 1950  MRN: [id number]  Referring provider: Aleksandra Sahni MD  Dx:   Encounter Diagnosis     ICD-10-CM    1  Neck pain  M54 2        Start Time: 0900  Stop Time: 0940  Total time in clinic (min): 40 minutes    Subjective: Pt currently rates neck pain as a 5/10  She states she has been experiencing left ear pain  She describes it as superficial        Objective: See treatment diary below      Assessment: Tolerated treatment well  Patient states improvement in symptoms post session today  Educated to incorporate cervical rotation with over pressure for HEP  Patient demonstrated fatigue post treatment, exhibited good technique with therapeutic exercises and would benefit from continued PT      Plan: Continue per plan of care  Progress treatment as tolerated         Precautions: GERD, BMI    PT 1:1   Manuals 2022   6/10/2022   2022   2022           IASTM             STM SOR, Scalene,  SOR, scalene, UT, paraspinals SOR, scalene, UT, paraspinals SOR, scalene, paraspinals, UT         JT Mob  1st Rib 1st rib 1st rib                      Neuro Re-Ed             Chin Tuck  3" 10x 3" 15x 3" 15x         Scap Retraction HEP 3" 10x 3" 15x 3" 15x         Horizontal Abduction  YTB 3" 10x YTB 3" 15x          TB Row  RTB 3" 2x10 RTB 3" 2x10 RTB 2x10         GH Ext  RTB 3" 2x10 RTB 3" 2x10 RTB 2x10                                   Ther Ex             UT Stretch HEP 30" 3x ea 30" 3x ea 30" 3x ea         LS Stretch HEP 30" 3x ea 30" 3x ea 30" 3x ea         Cervical Arom  rotation bilaterally 2x10 rotation bilat 2x10, with STM of left upper trap rotation w/ OP 2x10         Wall Slide   2x10 2x10                                                              Ther Activity                                       Gait Training                                       Modalities

## 2022-06-20 ENCOUNTER — OFFICE VISIT (OUTPATIENT)
Dept: PHYSICAL THERAPY | Facility: MEDICAL CENTER | Age: 72
End: 2022-06-20
Payer: COMMERCIAL

## 2022-06-20 DIAGNOSIS — M54.2 NECK PAIN: Primary | ICD-10-CM

## 2022-06-20 PROCEDURE — 97112 NEUROMUSCULAR REEDUCATION: CPT

## 2022-06-20 PROCEDURE — 97140 MANUAL THERAPY 1/> REGIONS: CPT

## 2022-06-20 PROCEDURE — 97110 THERAPEUTIC EXERCISES: CPT

## 2022-06-20 NOTE — PROGRESS NOTES
Daily Note     Today's date: 2022  Patient name: Jovana Villa  : 1950  MRN: [id number]   Referring provider: Judie Holder MD  Dx:   Encounter Diagnosis     ICD-10-CM    1  Neck pain  M54 2        Start Time: 945  Stop Time: 103  Total time in clinic (min): 45 minutes    Subjective: Pt states after previous visit, she had no pain for two days in her neck  She states previously mentioned superficial ear pain was also diminshed  Objective: See treatment diary below      Assessment: Tolerated treatment well  Patient denies pain with scalene stretch  Patient states a decreased in ear pain at end of session  Patient demonstrated fatigue post treatment, exhibited good technique with therapeutic exercises and would benefit from continued PT      Plan: Continue per plan of care  Progress treatment as tolerated         Precautions: GERD, BMI    PT 1:1   Manuals 2022   6/10/2022   2022   2022   2022          IASTM             STM SOR, Scalene,  SOR, scalene, UT, paraspinals SOR, scalene, UT, paraspinals SOR, scalene, paraspinals, UT SOR, scalene, paraspinals, UT        JT Mob  1st Rib 1st rib 1st rib 1st rib                     Neuro Re-Ed             Chin Tuck  3" 10x 3" 15x 3" 15x 3" 20x        Scap Retraction HEP 3" 10x 3" 15x 3" 15x 3" 20x        Horizontal Abduction  YTB 3" 10x YTB 3" 15x          TB Row  RTB 3" 2x10 RTB 3" 2x10 RTB 2x10 RTB 2x10        GH Ext  RTB 3" 2x10 RTB 3" 2x10 RTB 2x10 RTB 2x10                                  Ther Ex             UT Stretch HEP 30" 3x ea 30" 3x ea 30" 3x ea 30" 3x ea        LS Stretch HEP 30" 3x ea 30" 3x ea 30" 3x ea 30" 3xea        Cervical Arom  rotation bilaterally 2x10 rotation bilat 2x10, with STM of left upper trap rotation w/ OP 2x10 rotation w/ OP 2x10        Wall Slide   2x10 2x10  2x10        Scalene Stretch     30" 3x                                               Ther Activity                                       Gait Training                                       Modalities

## 2022-06-23 ENCOUNTER — OFFICE VISIT (OUTPATIENT)
Dept: PHYSICAL THERAPY | Facility: MEDICAL CENTER | Age: 72
End: 2022-06-23
Payer: COMMERCIAL

## 2022-06-23 DIAGNOSIS — M54.2 NECK PAIN: Primary | ICD-10-CM

## 2022-06-23 PROCEDURE — 97110 THERAPEUTIC EXERCISES: CPT

## 2022-06-23 PROCEDURE — 97140 MANUAL THERAPY 1/> REGIONS: CPT

## 2022-06-23 NOTE — PROGRESS NOTES
Daily Note     Today's date: 2022  Patient name: Michaela Mabry  : 1950  MRN: [id number]  Referring provider: Sidney Griffith MD  Dx:   Encounter Diagnosis     ICD-10-CM    1  Neck pain  M54 2                   Subjective: Pt states she continues notice improvements with decreased pain  Notes she gets a few days of relief following PT sessions  Objective: See treatment diary below      Assessment: Pt tolerated session well  Notes good relief following manual interventions  Cueing given for banded postural exercises to ensure proper posture and pacing  General C/S muscle tightness resulting in limitations in motion  Pt would benefit from continued PT  Plan: Continue per plan of care  Progress treatment as tolerated         Precautions: GERD, BMI    PT 1:1   Manuals 2022   6/10/2022   2022   2022   2022   6/23       IASTM             STM SOR, Scalene,  SOR, scalene, UT, paraspinals SOR, scalene, UT, paraspinals SOR, scalene, paraspinals, UT SOR, scalene, paraspinals, UT        JT Mob  1st Rib 1st rib 1st rib 1st rib                     Neuro Re-Ed             Chin Tuck  3" 10x 3" 15x 3" 15x 3" 20x        Scap Retraction HEP 3" 10x 3" 15x 3" 15x 3" 20x        Horizontal Abduction  YTB 3" 10x YTB 3" 15x          TB Row  RTB 3" 2x10 RTB 3" 2x10 RTB 2x10 RTB 2x10        GH Ext  RTB 3" 2x10 RTB 3" 2x10 RTB 2x10 RTB 2x10                                  Ther Ex             UT Stretch HEP 30" 3x ea 30" 3x ea 30" 3x ea 30" 3x ea        LS Stretch HEP 30" 3x ea 30" 3x ea 30" 3x ea 30" 3xea        Cervical Arom  rotation bilaterally 2x10 rotation bilat 2x10, with STM of left upper trap rotation w/ OP 2x10 rotation w/ OP 2x10        Wall Slide   2x10 2x10  2x10        Scalene Stretch     30" 3x                                               Ther Activity                                       Gait Training                                       Modalities

## 2022-06-27 ENCOUNTER — OFFICE VISIT (OUTPATIENT)
Dept: PHYSICAL THERAPY | Facility: MEDICAL CENTER | Age: 72
End: 2022-06-27
Payer: COMMERCIAL

## 2022-06-27 DIAGNOSIS — M54.2 NECK PAIN: Primary | ICD-10-CM

## 2022-06-27 PROCEDURE — 97112 NEUROMUSCULAR REEDUCATION: CPT

## 2022-06-27 PROCEDURE — 97140 MANUAL THERAPY 1/> REGIONS: CPT

## 2022-06-27 PROCEDURE — 97110 THERAPEUTIC EXERCISES: CPT

## 2022-06-27 NOTE — PROGRESS NOTES
Daily Note and Discharge     Today's date: 2022  Patient name: Teri Celaya  : 1950  MRN: [id number]  Referring provider: Mi Corea MD  Dx:   Encounter Diagnosis     ICD-10-CM    1  Neck pain  M54 2        Start Time: 1200  Stop Time: 1240  Total time in clinic (min): 40 minutes    Subjective: Pt denies pain at this time  She states noted improvement with ROM and function  Objective: See treatment diary below      Assessment: Tolerated treatment well  Patient demonstrated fatigue post treatment and exhibited good technique with therapeutic exercises Pt has progressed and met goal of pain free motion  She is currently independent with HEP  At this time patient is agreeable to discharge from skilled PT services  Plan: Discharge this session       Precautions: GERD, BMI    PT 1:1   Manuals 2022   6/10/2022   2022   2022   2022   6/23 2022        IASTM             STM SOR, Scalene,  SOR, scalene, UT, paraspinals SOR, scalene, UT, paraspinals SOR, scalene, paraspinals, UT SOR, scalene, paraspinals, UT  SOR, scalene, paraspinals, UT      JT Mob  1st Rib 1st rib 1st rib 1st rib  1st Rib                   Neuro Re-Ed             Chin Tuck  3" 10x 3" 15x 3" 15x 3" 20x  3" 20x      Scap Retraction HEP 3" 10x 3" 15x 3" 15x 3" 20x  3" 20x      Horizontal Abduction  YTB 3" 10x YTB 3" 15x          TB Row  RTB 3" 2x10 RTB 3" 2x10 RTB 2x10 RTB 2x10  RTB 2x10      GH Ext  RTB 3" 2x10 RTB 3" 2x10 RTB 2x10 RTB 2x10  RTB 2x10                                Ther Ex             UT Stretch HEP 30" 3x ea 30" 3x ea 30" 3x ea 30" 3x ea  30" 3x ea      LS Stretch HEP 30" 3x ea 30" 3x ea 30" 3x ea 30" 3xea  30" 3x ea      Cervical Arom  rotation bilaterally 2x10 rotation bilat 2x10, with STM of left upper trap rotation w/ OP 2x10 rotation w/ OP 2x10  rotation w/ OP 2x10      Wall Slide   2x10 2x10  2x10  2x10      Scalene Stretch     30" 3x  30" 3x      Open Book       1x0 BL Ther Activity                                       Gait Training                                       Modalities

## 2022-07-01 ENCOUNTER — VBI (OUTPATIENT)
Dept: ADMINISTRATIVE | Facility: OTHER | Age: 72
End: 2022-07-01

## 2022-07-06 DIAGNOSIS — F32.1 CURRENT MODERATE EPISODE OF MAJOR DEPRESSIVE DISORDER WITHOUT PRIOR EPISODE (HCC): ICD-10-CM

## 2022-07-06 RX ORDER — VENLAFAXINE HYDROCHLORIDE 150 MG/1
150 CAPSULE, EXTENDED RELEASE ORAL DAILY
Qty: 90 CAPSULE | Refills: 0 | Status: SHIPPED | OUTPATIENT
Start: 2022-07-06 | End: 2022-09-28 | Stop reason: SDUPTHER

## 2022-07-17 ENCOUNTER — OFFICE VISIT (OUTPATIENT)
Dept: URGENT CARE | Facility: MEDICAL CENTER | Age: 72
End: 2022-07-17
Payer: COMMERCIAL

## 2022-07-17 ENCOUNTER — APPOINTMENT (OUTPATIENT)
Dept: RADIOLOGY | Facility: MEDICAL CENTER | Age: 72
End: 2022-07-17
Payer: COMMERCIAL

## 2022-07-17 VITALS
DIASTOLIC BLOOD PRESSURE: 80 MMHG | RESPIRATION RATE: 16 BRPM | TEMPERATURE: 97.7 F | HEART RATE: 68 BPM | OXYGEN SATURATION: 100 % | SYSTOLIC BLOOD PRESSURE: 140 MMHG

## 2022-07-17 DIAGNOSIS — S93.402A SPRAIN OF LEFT ANKLE, UNSPECIFIED LIGAMENT, INITIAL ENCOUNTER: ICD-10-CM

## 2022-07-17 DIAGNOSIS — S90.32XA CONTUSION OF LEFT FOOT, INITIAL ENCOUNTER: Primary | ICD-10-CM

## 2022-07-17 DIAGNOSIS — S90.32XA CONTUSION OF LEFT FOOT, INITIAL ENCOUNTER: ICD-10-CM

## 2022-07-17 PROCEDURE — S9088 SERVICES PROVIDED IN URGENT: HCPCS | Performed by: PHYSICIAN ASSISTANT

## 2022-07-17 PROCEDURE — 73630 X-RAY EXAM OF FOOT: CPT

## 2022-07-17 PROCEDURE — 99213 OFFICE O/P EST LOW 20 MIN: CPT | Performed by: PHYSICIAN ASSISTANT

## 2022-07-17 PROCEDURE — 73610 X-RAY EXAM OF ANKLE: CPT

## 2022-07-17 NOTE — PATIENT INSTRUCTIONS
Rest injured body part  May use ice or heat as needed    Use the Cam boot as directed     Elevate injured body part as able to help decrease pain and swelling  Follow-up with Podiatry for further evaluation

## 2022-07-17 NOTE — PROGRESS NOTES
[facility]        NAME: Rosa Roman is a 66 y o  female  : 1950    MRN: [id number]  DATE: 2022  TIME: 8:31 AM    Assessment and Plan   Contusion of left foot, initial encounter [S90 32XA]  1  Contusion of left foot, initial encounter  XR foot 3+ vw left   2  Sprain of left ankle, unspecified ligament, initial encounter  XR ankle 3+ vw left         Patient Instructions       Follow up with PCP in 3-5 days  Proceed to  ER if symptoms worsen  Chief Complaint     Chief Complaint   Patient presents with    Foot Pain     Fell 1 5 weeks ago down stairs  Left ankle pain, swelling, possible 5th toe injury  Elevating, icing  Pain 7/10 constant  History of Present Illness       Patient is here for evaluation of pain swelling in her left foot ankle  Patient states that a little over week ago she was coming down the steps and missed the last 3 and landed and twisted her left foot ankle  Patient did use ice and elevation but has continued pain swelling  Review of Systems   Review of Systems   Constitutional: Negative  Musculoskeletal: Positive for arthralgias and gait problem  Skin: Negative for wound           Current Medications       Current Outpatient Medications:     cyclobenzaprine (FLEXERIL) 10 mg tablet, Take 1 tablet (10 mg total) by mouth 3 (three) times a day as needed for muscle spasms, Disp: 30 tablet, Rfl: 0    divalproex sodium (DEPAKOTE) 500 mg EC tablet, Take 1 tablet (500 mg total) by mouth every 12 (twelve) hours, Disp: 180 tablet, Rfl: 0    levothyroxine 75 mcg tablet, Take 1 tablet (75 mcg total) by mouth daily in the early morning, Disp: 90 tablet, Rfl: 1    multivitamin (THERAGRAN) TABS, Take 1 tablet by mouth daily, Disp: , Rfl:     nabumetone (RELAFEN) 750 mg tablet, Take 1 tablet (750 mg total) by mouth 2 (two) times a day as needed for mild pain, Disp: 20 tablet, Rfl: 1    pantoprazole (PROTONIX) 40 mg tablet, Take 1 tablet (40 mg total) by mouth daily before breakfast, Disp: 90 tablet, Rfl: 1    sucralfate (CARAFATE) 1 g/10 mL suspension, Take 10 mL (1 g total) by mouth in the morning and 10 mL (1 g total) at noon and 10 mL (1 g total) in the evening and 10 mL (1 g total) before bedtime  , Disp: 414 mL, Rfl: 1    traZODone (DESYREL) 50 mg tablet, Take 1 tablet (50 mg total) by mouth daily at bedtime as needed for sleep, Disp: 90 tablet, Rfl: 1    venlafaxine (EFFEXOR-XR) 150 mg 24 hr capsule, Take 1 capsule (150 mg total) by mouth daily, Disp: 90 capsule, Rfl: 0    venlafaxine (EFFEXOR-XR) 75 mg 24 hr capsule, Take 1 capsule (75 mg total) by mouth daily with breakfast, Disp: 90 capsule, Rfl: 1    Current Allergies     Allergies as of 07/17/2022 - Reviewed 07/17/2022   Allergen Reaction Noted    Clindamycin Hives 09/21/2010            The following portions of the patient's history were reviewed and updated as appropriate: allergies, current medications, past family history, past medical history, past social history, past surgical history and problem list      Past Medical History:   Diagnosis Date    Anxiety     Disease of thyroid gland     GERD (gastroesophageal reflux disease)     H/O bilateral hip replacements        Past Surgical History:   Procedure Laterality Date    BACK SURGERY      lumbar spine    HIP SURGERY Bilateral     JOINT REPLACEMENT      KNEE SURGERY Right     TOTAL SHOULDER REPLACEMENT Right        Family History   Problem Relation Age of Onset    Lymphoma Mother     No Known Problems Father     No Known Problems Daughter     No Known Problems Daughter     No Known Problems Maternal Grandmother     No Known Problems Maternal Grandfather     No Known Problems Paternal Grandmother     No Known Problems Paternal Grandfather     No Known Problems Brother          Medications have been verified  Objective   /80   Pulse 68   Temp 97 7 °F (36 5 °C)   Resp 16   SpO2 100%   No LMP recorded   Patient is postmenopausal        Physical Exam     Physical Exam  Vitals and nursing note reviewed  Constitutional:       General: She is not in acute distress  Appearance: Normal appearance  She is well-developed  She is not ill-appearing, toxic-appearing or diaphoretic  HENT:      Head: Normocephalic and atraumatic  Eyes:      Extraocular Movements: Extraocular movements intact  Conjunctiva/sclera: Conjunctivae normal       Pupils: Pupils are equal, round, and reactive to light  Musculoskeletal:      Comments: Range of motion of the left foot ankle intact but limited  Patient has tenderness of the left 5th toe as well as the midfoot and medial and lateral aspect of the ankle  There is soft tissue swelling in the foot and ankle  Resolving ecchymosis present  No laxity  Skin:     General: Skin is warm and dry  Findings: No rash  Neurological:      General: No focal deficit present  Mental Status: She is alert and oriented to person, place, and time  Psychiatric:         Mood and Affect: Mood normal          Behavior: Behavior normal          Thought Content: Thought content normal          Judgment: Judgment normal        X-ray shows questionable small avulsion fracture on the lateral aspect of the foot  Patient has a Cam boot at home and recommend use until evaluation by podiatry  Patient may use an Ace wrap as needed

## 2022-07-18 ENCOUNTER — OFFICE VISIT (OUTPATIENT)
Dept: PODIATRY | Facility: CLINIC | Age: 72
End: 2022-07-18
Payer: COMMERCIAL

## 2022-07-18 VITALS
HEART RATE: 64 BPM | WEIGHT: 203 LBS | SYSTOLIC BLOOD PRESSURE: 147 MMHG | BODY MASS INDEX: 33.78 KG/M2 | DIASTOLIC BLOOD PRESSURE: 97 MMHG

## 2022-07-18 DIAGNOSIS — S93.492A HIGH ANKLE SPRAIN OF LEFT LOWER EXTREMITY, INITIAL ENCOUNTER: Primary | ICD-10-CM

## 2022-07-18 PROCEDURE — 99204 OFFICE O/P NEW MOD 45 MIN: CPT | Performed by: PODIATRIST

## 2022-07-18 PROCEDURE — 1160F RVW MEDS BY RX/DR IN RCRD: CPT | Performed by: PODIATRIST

## 2022-07-18 RX ORDER — AMOXICILLIN 500 MG/1
CAPSULE ORAL
COMMUNITY
Start: 2022-06-16

## 2022-07-18 NOTE — PROGRESS NOTES
This patient was seen on 7/18/22  My role is Foot , Ankle, and Wound Specialist    SUBJECTIVE    Chief Complaint:  Ankle pain     Patient ID: Everette Gallegos is a 66 y o  female  Renay is here with ankle pain  She fell at home and twisted her ankle 7/5 with subsequent bruising, pain and swelling  The ankle hasn't gotten better at the rate she expected so she went to urgent care yesterday for an evaluation and got an xray and was referred here  The following portions of the patient's history were reviewed and updated as appropriate: allergies, current medications, past family history, past medical history, past social history, past surgical history and problem list     Review of Systems   Constitutional: Positive for activity change  Negative for appetite change, chills, diaphoresis, fatigue, fever and unexpected weight change  Musculoskeletal: Positive for arthralgias, gait problem and joint swelling  OBJECTIVE      /97   Pulse 64   Wt 92 1 kg (203 lb)   BMI 33 78 kg/m²     Foot/Ankle Musculoskeletal Exam    General      Neurological: alert      General additional comments:  I note muscle function of the anterior, posterior, evertor and invertor muscle groups of the lower leg are intact and normal  I note ROM of the ankle joint, subtalar joint, Choparts and Lisfranc's joint complexes and metatarsophalangeal joints are WNL without crepitus nor restrictions bilaterally  I note some edema throughout the hindfoot and ankle Left  No bruising  No blisters  I note pain on palpation over the anterior talofibular ligament  Also, I note pain in the ankle and syndesmosis on low calf squeeze on the Left  Physical Exam  Vitals and nursing note reviewed  Constitutional:       General: She is not in acute distress  Appearance: Normal appearance  She is not ill-appearing, toxic-appearing or diaphoretic  HENT:      Head: Normocephalic and atraumatic     Musculoskeletal:         General: Swelling and tenderness present  Comments:  I note muscle function of the anterior, posterior, evertor and invertor muscle groups of the lower leg are intact and normal  I note ROM of the ankle joint, subtalar joint, Choparts and Lisfranc's joint complexes and metatarsophalangeal joints are WNL without crepitus nor restrictions bilaterally  I note some edema throughout the hindfoot and ankle Left  No bruising  No blisters  I note pain on palpation over the anterior talofibular ligament  Also, I note pain in the ankle and syndesmosis on low calf squeeze on the Left  Neurological:      Mental Status: She is alert  ASSESSMENT     Diagnoses and all orders for this visit:    High ankle sprain of left lower extremity, initial encounter  -     Ambulatory referral to Physical Therapy; Future    Other orders  -     amoxicillin (AMOXIL) 500 mg capsule; TAKE 4 CAPSULES 1 HOUR PRIOR TO DENTAL VISIT (Patient not taking: Reported on 7/18/2022)         Problem List Items Addressed This Visit        Musculoskeletal and Integument    High ankle sprain of left lower extremity - Primary    Relevant Orders    Ambulatory referral to Physical Therapy              PLAN    I reviewed the 7/17/22 xrays noting a tiny sabas fracture 5th toe Left  No fractures in the hindfoot nor ankle  I feel she has a high ankle sprain  As she hasn't recovered well; I suggest PT  I wrote a prescription for physical therapy and will re-evaluate her in a month  If not improved at that point, will consider MRI

## 2022-08-02 ENCOUNTER — APPOINTMENT (OUTPATIENT)
Dept: LAB | Facility: MEDICAL CENTER | Age: 72
End: 2022-08-02
Payer: COMMERCIAL

## 2022-08-02 ENCOUNTER — HOSPITAL ENCOUNTER (OUTPATIENT)
Dept: RADIOLOGY | Facility: MEDICAL CENTER | Age: 72
Discharge: HOME | End: 2022-08-02
Payer: COMMERCIAL

## 2022-08-02 DIAGNOSIS — Z78.0 MENOPAUSE: ICD-10-CM

## 2022-08-02 DIAGNOSIS — E03.4 HYPOTHYROIDISM DUE TO ACQUIRED ATROPHY OF THYROID: ICD-10-CM

## 2022-08-02 DIAGNOSIS — C83.00 SMALL B-CELL LYMPHOMA, UNSPECIFIED BODY REGION (HCC): ICD-10-CM

## 2022-08-02 LAB
BASOPHILS # BLD AUTO: 0.04 THOUSANDS/ΜL (ref 0–0.1)
BASOPHILS NFR BLD AUTO: 1 % (ref 0–1)
EOSINOPHIL # BLD AUTO: 0.13 THOUSAND/ΜL (ref 0–0.61)
EOSINOPHIL NFR BLD AUTO: 2 % (ref 0–6)
ERYTHROCYTE [DISTWIDTH] IN BLOOD BY AUTOMATED COUNT: 13.4 % (ref 11.6–15.1)
HCT VFR BLD AUTO: 46.3 % (ref 34.8–46.1)
HGB BLD-MCNC: 14.4 G/DL (ref 11.5–15.4)
IMM GRANULOCYTES # BLD AUTO: 0.03 THOUSAND/UL (ref 0–0.2)
IMM GRANULOCYTES NFR BLD AUTO: 1 % (ref 0–2)
LYMPHOCYTES # BLD AUTO: 3.32 THOUSANDS/ΜL (ref 0.6–4.47)
LYMPHOCYTES NFR BLD AUTO: 62 % (ref 14–44)
MCH RBC QN AUTO: 30.3 PG (ref 26.8–34.3)
MCHC RBC AUTO-ENTMCNC: 31.1 G/DL (ref 31.4–37.4)
MCV RBC AUTO: 98 FL (ref 82–98)
MONOCYTES # BLD AUTO: 0.44 THOUSAND/ΜL (ref 0.17–1.22)
MONOCYTES NFR BLD AUTO: 8 % (ref 4–12)
NEUTROPHILS # BLD AUTO: 1.36 THOUSANDS/ΜL (ref 1.85–7.62)
NEUTS SEG NFR BLD AUTO: 26 % (ref 43–75)
NRBC BLD AUTO-RTO: 0 /100 WBCS
PLATELET # BLD AUTO: 123 THOUSANDS/UL (ref 149–390)
PMV BLD AUTO: 10.6 FL (ref 8.9–12.7)
RBC # BLD AUTO: 4.75 MILLION/UL (ref 3.81–5.12)
TSH SERPL DL<=0.05 MIU/L-ACNC: 2.02 UIU/ML (ref 0.45–4.5)
WBC # BLD AUTO: 5.32 THOUSAND/UL (ref 4.31–10.16)

## 2022-08-02 PROCEDURE — 36415 COLL VENOUS BLD VENIPUNCTURE: CPT

## 2022-08-02 PROCEDURE — 85025 COMPLETE CBC W/AUTO DIFF WBC: CPT

## 2022-08-02 PROCEDURE — 77080 DXA BONE DENSITY AXIAL: CPT

## 2022-08-02 PROCEDURE — 84443 ASSAY THYROID STIM HORMONE: CPT

## 2022-08-09 ENCOUNTER — RA CDI HCC (OUTPATIENT)
Dept: OTHER | Facility: HOSPITAL | Age: 72
End: 2022-08-09

## 2022-08-16 NOTE — PROGRESS NOTES
Depression Screening and Follow-up Plan: Patient assessed for underlying major depression  Brief counseling provided and recommend additional follow-up/re-evaluation next office visit  Patient advised to follow-up with PCP for further management  Falls Plan of Care: balance, strength, and gait training instructions were provided  Home safety education provided  Assessment/Plan:         Problem List Items Addressed This Visit        Digestive    Gastroesophageal reflux disease without esophagitis - Primary     Patient to continue with present therapy and decrease caffeine, avoid ETOH and smoking to decrease acid production  Pt should also cease eating prior to bedtime and avoid excessive fluid intake prior to sleep  May use antacids as needed for breakthrough GERD  All pateint questions answered today about this condition  Relevant Medications    pantoprazole (PROTONIX) 40 mg tablet       Endocrine    Hypothyroidism     Patient to continue current dose of thyroid medicine and recheck TSH in 6 months         Relevant Orders    TSH + Free T4       Other    Current moderate episode of major depressive disorder without prior episode [facility])     Patient to continue utilizing medical therapy as well and counseling sources as applicable for condition  If  suicidal thought or fear of suicide to contact 911 and seek help immediately   Meds reviewed and patient questions answered today         Bipolar affective disorder, currently depressed, mild (HCC)    Relevant Medications    divalproex sodium (DEPAKOTE) 500 mg EC tablet    Class 2 obesity without serious comorbidity in adult      Other Visit Diagnoses     Other fatigue        Relevant Orders    Comprehensive metabolic panel    CBC and differential    Magnesium    Uric acid    Lipid Panel with Direct LDL reflex    Urinalysis with microscopic    Vision changes        Relevant Orders    Ambulatory Referral to Optometry            Subjective:      Patient ID: Florance Nissen is a 66 y o  female  26-year-old female here today for checkup on multiple medical problems  Patient with osteopenia as per DEXA scan she has some GERD with the antritis  History of some anxiety and depression a history of hip replacements and right shoulder replacement  Patient is doing well with her medications she still has some gastritis type symptoms although she had an EGD done recently which showed some antritis and she is following up with Dr Her sheehan soon  Patient needs refills on medicines which was done for today  Also her lab work was reviewed today she is doing quite well with her thyroid has no signs of anemia  Patient's DEXA scan was reviewed and she has osteopenia and we recommend she take calcium and vitamin D and keep an active exercise regimen  The following portions of the patient's history were reviewed and updated as appropriate:   Past Medical History:  She has a past medical history of Anxiety, Disease of thyroid gland, GERD (gastroesophageal reflux disease), and H/O bilateral hip replacements  ,  _______________________________________________________________________  Medical Problems:  does not have any pertinent problems on file ,  _______________________________________________________________________  Past Surgical History:   has a past surgical history that includes Hip surgery (Bilateral); Knee surgery (Right); Joint replacement; Total shoulder replacement (Right); and Back surgery  ,  _______________________________________________________________________  Family History:  family history includes Lymphoma in her mother; No Known Problems in her brother, daughter, daughter, father, maternal grandfather, maternal grandmother, paternal grandfather, and paternal grandmother ,  _______________________________________________________________________  Social History:   reports that she has never smoked   She has never used smokeless tobacco  She reports current alcohol use  She reports that she does not use drugs  ,  _______________________________________________________________________  Allergies:  is allergic to clindamycin     _______________________________________________________________________  Current Outpatient Medications   Medication Sig Dispense Refill    cyclobenzaprine (FLEXERIL) 10 mg tablet Take 1 tablet (10 mg total) by mouth 3 (three) times a day as needed for muscle spasms 30 tablet 0    divalproex sodium (DEPAKOTE) 500 mg EC tablet Take 1 tablet (500 mg total) by mouth every 12 (twelve) hours 180 tablet 1    levothyroxine 75 mcg tablet Take 1 tablet (75 mcg total) by mouth daily in the early morning 90 tablet 1    multivitamin (THERAGRAN) TABS Take 1 tablet by mouth daily      pantoprazole (PROTONIX) 40 mg tablet Take 1 tablet (40 mg total) by mouth daily before breakfast 90 tablet 1    sucralfate (CARAFATE) 1 g/10 mL suspension Take 10 mL (1 g total) by mouth in the morning and 10 mL (1 g total) at noon and 10 mL (1 g total) in the evening and 10 mL (1 g total) before bedtime  414 mL 1    traZODone (DESYREL) 50 mg tablet Take 1 tablet (50 mg total) by mouth daily at bedtime as needed for sleep 90 tablet 1    venlafaxine (EFFEXOR-XR) 150 mg 24 hr capsule Take 1 capsule (150 mg total) by mouth daily 90 capsule 0    venlafaxine (EFFEXOR-XR) 75 mg 24 hr capsule Take 1 capsule (75 mg total) by mouth daily with breakfast 90 capsule 1    amoxicillin (AMOXIL) 500 mg capsule TAKE 4 CAPSULES 1 HOUR PRIOR TO DENTAL VISIT (Patient not taking: Reported on 7/18/2022)       No current facility-administered medications for this visit      _______________________________________________________________________  Review of Systems   Constitutional: Negative for activity change, appetite change, fatigue and fever  HENT: Negative for congestion, ear pain, postnasal drip, rhinorrhea, sinus pressure, sinus pain, sneezing and sore throat      Eyes: Negative for pain and redness  Respiratory: Negative for apnea, cough, chest tightness, shortness of breath and wheezing  Cardiovascular: Negative for chest pain, palpitations and leg swelling  Gastrointestinal: Positive for abdominal pain  Negative for constipation, diarrhea, nausea and vomiting  Endocrine: Negative for cold intolerance and heat intolerance  Genitourinary: Negative for difficulty urinating, dysuria, frequency, hematuria and urgency  Musculoskeletal: Negative for arthralgias, back pain, gait problem and myalgias  Skin: Negative for rash  Neurological: Negative for dizziness, speech difficulty, weakness, numbness and headaches  Hematological: Does not bruise/bleed easily  Psychiatric/Behavioral: Negative for agitation, confusion and hallucinations  Objective:  Vitals:    08/17/22 0940   BP: 160/90   BP Location: Left arm   Patient Position: Sitting   Cuff Size: Standard   Pulse: 60   Temp: 97 7 °F (36 5 °C)   TempSrc: Temporal   SpO2: 97%   Weight: 93 4 kg (206 lb)   Height: 5' 5" (1 651 m)     Body mass index is 34 28 kg/m²  Physical Exam  Vitals and nursing note reviewed  Constitutional:       Appearance: She is well-developed  HENT:      Head: Normocephalic and atraumatic  Nose: Nose normal    Eyes:      General: No scleral icterus  Conjunctiva/sclera: Conjunctivae normal       Pupils: Pupils are equal, round, and reactive to light  Neck:      Thyroid: No thyromegaly  Pulmonary:      Effort: Pulmonary effort is normal       Breath sounds: Normal breath sounds  No wheezing  Abdominal:      General: Bowel sounds are normal  There is no distension  Palpations: Abdomen is soft  Tenderness: There is no abdominal tenderness  There is no guarding or rebound  Musculoskeletal:         General: No tenderness or deformity  Normal range of motion  Cervical back: Normal range of motion and neck supple  Skin:     General: Skin is warm and dry        Findings: No erythema or rash  Neurological:      Mental Status: She is alert and oriented to person, place, and time  Sensory: No sensory deficit  Psychiatric:         Behavior: Behavior normal          Thought Content:  Thought content normal          Judgment: Judgment normal

## 2022-08-17 ENCOUNTER — OFFICE VISIT (OUTPATIENT)
Dept: FAMILY MEDICINE CLINIC | Facility: CLINIC | Age: 72
End: 2022-08-17
Payer: COMMERCIAL

## 2022-08-17 VITALS
HEART RATE: 60 BPM | DIASTOLIC BLOOD PRESSURE: 90 MMHG | OXYGEN SATURATION: 97 % | BODY MASS INDEX: 34.32 KG/M2 | HEIGHT: 65 IN | TEMPERATURE: 97.7 F | WEIGHT: 206 LBS | SYSTOLIC BLOOD PRESSURE: 160 MMHG

## 2022-08-17 DIAGNOSIS — K21.9 GASTROESOPHAGEAL REFLUX DISEASE WITHOUT ESOPHAGITIS: Primary | ICD-10-CM

## 2022-08-17 DIAGNOSIS — R53.83 OTHER FATIGUE: ICD-10-CM

## 2022-08-17 DIAGNOSIS — E03.4 HYPOTHYROIDISM DUE TO ACQUIRED ATROPHY OF THYROID: ICD-10-CM

## 2022-08-17 DIAGNOSIS — F32.1 CURRENT MODERATE EPISODE OF MAJOR DEPRESSIVE DISORDER WITHOUT PRIOR EPISODE (HCC): ICD-10-CM

## 2022-08-17 DIAGNOSIS — E66.9 CLASS 2 OBESITY WITHOUT SERIOUS COMORBIDITY IN ADULT, UNSPECIFIED BMI, UNSPECIFIED OBESITY TYPE: ICD-10-CM

## 2022-08-17 DIAGNOSIS — F31.31 BIPOLAR AFFECTIVE DISORDER, CURRENTLY DEPRESSED, MILD (HCC): ICD-10-CM

## 2022-08-17 DIAGNOSIS — H53.9 VISION CHANGES: ICD-10-CM

## 2022-08-17 PROCEDURE — 99214 OFFICE O/P EST MOD 30 MIN: CPT | Performed by: FAMILY MEDICINE

## 2022-08-17 RX ORDER — PANTOPRAZOLE SODIUM 40 MG/1
40 TABLET, DELAYED RELEASE ORAL
Qty: 90 TABLET | Refills: 1 | Status: SHIPPED | OUTPATIENT
Start: 2022-08-17

## 2022-08-17 RX ORDER — DIVALPROEX SODIUM 500 MG/1
500 TABLET, DELAYED RELEASE ORAL EVERY 12 HOURS SCHEDULED
Qty: 180 TABLET | Refills: 1 | Status: SHIPPED | OUTPATIENT
Start: 2022-08-17

## 2022-08-23 ENCOUNTER — OFFICE VISIT (OUTPATIENT)
Dept: GASTROENTEROLOGY | Facility: CLINIC | Age: 72
End: 2022-08-23
Payer: COMMERCIAL

## 2022-08-23 VITALS
HEART RATE: 81 BPM | WEIGHT: 206 LBS | BODY MASS INDEX: 34.32 KG/M2 | DIASTOLIC BLOOD PRESSURE: 99 MMHG | SYSTOLIC BLOOD PRESSURE: 156 MMHG | HEIGHT: 65 IN

## 2022-08-23 DIAGNOSIS — R10.13 EPIGASTRIC PAIN: Primary | ICD-10-CM

## 2022-08-23 DIAGNOSIS — K21.9 GASTROESOPHAGEAL REFLUX DISEASE, UNSPECIFIED WHETHER ESOPHAGITIS PRESENT: ICD-10-CM

## 2022-08-23 DIAGNOSIS — R19.8 BORBORYGMUS: ICD-10-CM

## 2022-08-23 DIAGNOSIS — E66.3 OVERWEIGHT: ICD-10-CM

## 2022-08-23 DIAGNOSIS — Z12.11 COLON CANCER SCREENING: ICD-10-CM

## 2022-08-23 DIAGNOSIS — K31.89 GASTRIC NODULE: ICD-10-CM

## 2022-08-23 PROCEDURE — 99214 OFFICE O/P EST MOD 30 MIN: CPT | Performed by: INTERNAL MEDICINE

## 2022-08-23 PROCEDURE — 1160F RVW MEDS BY RX/DR IN RCRD: CPT | Performed by: INTERNAL MEDICINE

## 2022-08-23 RX ORDER — FAMOTIDINE 20 MG/1
20 TABLET, FILM COATED ORAL 2 TIMES DAILY
Qty: 120 TABLET | Refills: 1 | Status: SHIPPED | OUTPATIENT
Start: 2022-08-23 | End: 2022-08-24 | Stop reason: SDUPTHER

## 2022-08-23 RX ORDER — DICYCLOMINE HYDROCHLORIDE 10 MG/1
10 CAPSULE ORAL
Qty: 120 CAPSULE | Refills: 1 | Status: SHIPPED | OUTPATIENT
Start: 2022-08-23 | End: 2022-08-24 | Stop reason: SDUPTHER

## 2022-08-23 NOTE — PROGRESS NOTES
[facility] - Outpatient Follow-up Note  Florance Nissen 66 y o  female MRN: [id number]  Encounter: [id number]          ASSESSMENT AND PLAN:      1  Epigastric pain  Described as intermittent burning and gurgling sensation  She does have bad eating habits she was given anti-reflux handout  She states pantoprazole and sucralfate does not help  I told her to stop those  We will try Lamonte fashion Pepcid and some dicyclomine  We went over her endoscopy results there was no Barretts no H pylori I  She does have antral nodule at needs follow-up in a year  - famotidine (PEPCID) 20 mg tablet; Take 1 tablet (20 mg total) by mouth 2 (two) times a day  Dispense: 120 tablet; Refill: 1  - dicyclomine (BENTYL) 10 mg capsule; Take 1 capsule (10 mg total) by mouth 4 (four) times a day (before meals and at bedtime) As needed for spasm  Dispense: 120 capsule; Refill: 1    2  Gastroesophageal reflux disease, unspecified whether esophagitis present  As above  - famotidine (PEPCID) 20 mg tablet; Take 1 tablet (20 mg total) by mouth 2 (two) times a day  Dispense: 120 tablet; Refill: 1    3  Gastric nodule  Repeat EGD May of 2023    4  Colon cancer screening  She did Cologuard last December was negative  Her last colonoscopy prior to that done elsewhere was 10 years prior  She does not strongly believe she ever had any type of polyps  At the very least repeat Cologuard 3 years after the last 1  Should be December of 2024     5  Borborygmus  As above    6  Overweight  Exercise and portion control     ______________________________________________________________________    SUBJECTIVE:  Very pleasant 68-year-old lady who we see for epigastric burning, epigastric gurgling, reflux, colon cancer screening, her epigastric burning and gurgling has not changed on Carafate and Protonix  I asked her to stop them  She was given a handout on diet anti-reflux in nature  She will avoid milk products    She tells me on occasion when she eats she has to go the bathroom shortly thereafter is habits 3 or 4 times a day without diarrhea or blood  REVIEW OF SYSTEMS IS OTHERWISE NEGATIVE  Historical Information   Past Medical History:   Diagnosis Date    Anxiety     Disease of thyroid gland     GERD (gastroesophageal reflux disease)     H/O bilateral hip replacements      Past Surgical History:   Procedure Laterality Date    BACK SURGERY      lumbar spine    HIP SURGERY Bilateral     JOINT REPLACEMENT      KNEE SURGERY Right     TOTAL SHOULDER REPLACEMENT Right      Social History   Social History     Substance and Sexual Activity   Alcohol Use Yes    Comment: wine     Social History     Substance and Sexual Activity   Drug Use Never     Social History     Tobacco Use   Smoking Status Never Smoker   Smokeless Tobacco Never Used     Family History   Problem Relation Age of Onset    Lymphoma Mother     No Known Problems Father     No Known Problems Daughter     No Known Problems Daughter     No Known Problems Maternal Grandmother     No Known Problems Maternal Grandfather     No Known Problems Paternal Grandmother     No Known Problems Paternal Grandfather     No Known Problems Brother        Meds/Allergies       Current Outpatient Medications:     cyclobenzaprine (FLEXERIL) 10 mg tablet    dicyclomine (BENTYL) 10 mg capsule    divalproex sodium (DEPAKOTE) 500 mg EC tablet    famotidine (PEPCID) 20 mg tablet    levothyroxine 75 mcg tablet    multivitamin (THERAGRAN) TABS    pantoprazole (PROTONIX) 40 mg tablet    sucralfate (CARAFATE) 1 g/10 mL suspension    traZODone (DESYREL) 50 mg tablet    venlafaxine (EFFEXOR-XR) 150 mg 24 hr capsule    venlafaxine (EFFEXOR-XR) 75 mg 24 hr capsule    amoxicillin (AMOXIL) 500 mg capsule    Allergies   Allergen Reactions    Clindamycin Hives           Objective     Blood pressure 156/99, pulse 81, height 5' 5" (1 651 m), weight 93 4 kg (206 lb)   Body mass index is 34 28 kg/m²  PHYSICAL EXAM:      General Appearance:   Alert, cooperative, no distress   HEENT:   Normocephalic, atraumatic, anicteric      Neck:  Supple, symmetrical, trachea midline   Lungs:   Clear to auscultation bilaterally; no rales, rhonchi or wheezing; respirations unlabored    Heart[de-identified]   Regular rate and rhythm; no murmur, rub, or gallop  Abdomen:   Soft, non-tender, non-distended; normal bowel sounds; no masses, no organomegaly    Genitalia:   Deferred    Rectal:   Deferred    Extremities:  No cyanosis, clubbing or edema    Pulses:  2+ and symmetric    Skin:  No jaundice, rashes, or lesions    Lymph nodes:  No palpable cervical lymphadenopathy        Lab Results:   No visits with results within 1 Day(s) from this visit  Latest known visit with results is:   Appointment on 08/02/2022   Component Date Value    TSH 3RD GENERATON 08/02/2022 2 020     WBC 08/02/2022 5 32     RBC 08/02/2022 4 75     Hemoglobin 08/02/2022 14 4     Hematocrit 08/02/2022 46 3 (A)    MCV 08/02/2022 98     MCH 08/02/2022 30 3     MCHC 08/02/2022 31 1 (A)    RDW 08/02/2022 13 4     MPV 08/02/2022 10 6     Platelets 82/29/0091 123 (A)    nRBC 08/02/2022 0     Neutrophils Relative 08/02/2022 26 (A)    Immat GRANS % 08/02/2022 1     Lymphocytes Relative 08/02/2022 62 (A)    Monocytes Relative 08/02/2022 8     Eosinophils Relative 08/02/2022 2     Basophils Relative 08/02/2022 1     Neutrophils Absolute 08/02/2022 1 36 (A)    Immature Grans Absolute 08/02/2022 0 03     Lymphocytes Absolute 08/02/2022 3 32     Monocytes Absolute 08/02/2022 0 44     Eosinophils Absolute 08/02/2022 0 13     Basophils Absolute 08/02/2022 0 04          Radiology Results:   DXA bone density spine hip and pelvis    Result Date: 8/2/2022  Narrative: DXA SCAN CLINICAL HISTORY:  76-year-old postmenopausal female  Bilateral hip replacements  OTHER RISK FACTORS:  Takes PPIs and SSRIs   PHARMACOLOGIC THERAPY FOR OSTEOPOROSIS:  None  TECHNIQUE: Bone densitometry was performed using a Hologic Horizon A  bone densitometer  Regions of interest appear properly placed  COMPARISON: There are no prior DXA studies performed on this unit for comparison  RESULTS: LUMBAR SPINE L1-L3  (L4 vertebra excluded from analysis due to local structural abnormalities or artifact) : BMD  1 090  gm/cm2 T-score 0 7 HIPS: Not measured because of  prior bilateral arthroplasties  LEFT  FOREARM:  33% RADIUS BMD:  0 581  gm/cm2 T-score:  -1 9     Impression: 1  Low bone mass (osteopenia)  [Based on the left radius] 2  The future fracture risk, as calculated by the [facility]/WHO fracture risk assessment tool (FRAX), cannot be determined since FRAX requires valid hip BMD measurements  3   The current NOF guidelines recommend treating patients with a T-score of -2 5 or less in the lumbar spine or hips, or in post-menopausal women and men over the age of 48 with low bone mass (osteopenia) and a FRAX 10 year risk score of >3% for hip fracture and/or >20% for major osteoporotic fracture  4   The NOF recommends follow-up DXA in 1-2 years after initiating therapy for osteoporosis and every 2 years thereafter  More frequent evaluation is appropriate for patients with conditions associated with rapid bone loss, such as glucocorticoid therapy  The interval between DXA screenings may be longer for individuals without major risk factors and initial T-score in the normal or upper low bone mass range   WHO CLASSIFICATION: Normal (a T-score of -1 0 or higher) Low bone mineral density (a T-score of less than -1 0 but higher than -2 5) Osteoporosis (a T-score of -2 5 or less) Severe osteoporosis (a T-score of -2 5 or less with a fragility fracture) Workstation performed: [id number]

## 2022-08-24 ENCOUNTER — TELEPHONE (OUTPATIENT)
Dept: GASTROENTEROLOGY | Facility: CLINIC | Age: 72
End: 2022-08-24

## 2022-08-24 DIAGNOSIS — K21.9 GASTROESOPHAGEAL REFLUX DISEASE, UNSPECIFIED WHETHER ESOPHAGITIS PRESENT: ICD-10-CM

## 2022-08-24 DIAGNOSIS — R10.13 EPIGASTRIC PAIN: ICD-10-CM

## 2022-08-24 RX ORDER — FAMOTIDINE 20 MG/1
20 TABLET, FILM COATED ORAL 2 TIMES DAILY
Qty: 180 TABLET | Refills: 1 | Status: SHIPPED | OUTPATIENT
Start: 2022-08-24

## 2022-08-24 RX ORDER — DICYCLOMINE HYDROCHLORIDE 10 MG/1
10 CAPSULE ORAL
Qty: 360 CAPSULE | Refills: 1 | Status: SHIPPED | OUTPATIENT
Start: 2022-08-24

## 2022-08-24 NOTE — TELEPHONE ENCOUNTER
Patients GI provider:  Dr Kenroy Arellano    Number to return call: [phone]    Reason for call: Conrad Yuan from [employer] mail order called in stating that they received a script for the dicyclomine and famotidine  She stated it needs to be in as a 90 day supply, please resend       Scheduled procedure/appointment date if applicable: Apt/procedure NA

## 2022-09-28 DIAGNOSIS — F32.1 CURRENT MODERATE EPISODE OF MAJOR DEPRESSIVE DISORDER WITHOUT PRIOR EPISODE (HCC): ICD-10-CM

## 2022-09-28 DIAGNOSIS — F33.41 RECURRENT MAJOR DEPRESSIVE DISORDER, IN PARTIAL REMISSION (HCC): ICD-10-CM

## 2022-09-28 RX ORDER — VENLAFAXINE HYDROCHLORIDE 150 MG/1
150 CAPSULE, EXTENDED RELEASE ORAL DAILY
Qty: 90 CAPSULE | Refills: 0 | Status: SHIPPED | OUTPATIENT
Start: 2022-09-28

## 2022-09-28 RX ORDER — TRAZODONE HYDROCHLORIDE 50 MG/1
50 TABLET ORAL
Qty: 90 TABLET | Refills: 1 | Status: SHIPPED | OUTPATIENT
Start: 2022-09-28

## 2022-10-18 DIAGNOSIS — E03.9 HYPOTHYROIDISM, UNSPECIFIED TYPE: ICD-10-CM

## 2022-10-18 RX ORDER — LEVOTHYROXINE SODIUM 0.07 MG/1
75 TABLET ORAL
Qty: 90 TABLET | Refills: 1 | Status: SHIPPED | OUTPATIENT
Start: 2022-10-18

## 2022-10-20 ENCOUNTER — IMMUNIZATIONS (OUTPATIENT)
Dept: FAMILY MEDICINE CLINIC | Facility: CLINIC | Age: 72
End: 2022-10-20
Payer: COMMERCIAL

## 2022-10-20 DIAGNOSIS — Z23 IMMUNIZATION DUE: Primary | ICD-10-CM

## 2022-10-20 PROCEDURE — 90662 IIV NO PRSV INCREASED AG IM: CPT | Performed by: FAMILY MEDICINE

## 2022-10-20 PROCEDURE — G0008 ADMIN INFLUENZA VIRUS VAC: HCPCS | Performed by: FAMILY MEDICINE

## 2022-11-28 DIAGNOSIS — F31.31 BIPOLAR AFFECTIVE DISORDER, CURRENTLY DEPRESSED, MILD (HCC): ICD-10-CM

## 2022-11-28 RX ORDER — VENLAFAXINE HYDROCHLORIDE 75 MG/1
CAPSULE, EXTENDED RELEASE ORAL
Qty: 90 CAPSULE | Refills: 1 | Status: SHIPPED | OUTPATIENT
Start: 2022-11-28

## 2022-12-09 DIAGNOSIS — J06.9 VIRAL UPPER RESPIRATORY TRACT INFECTION: Primary | ICD-10-CM

## 2022-12-10 LAB
FLUAV RNA RESP QL NAA+PROBE: NEGATIVE
FLUBV RNA RESP QL NAA+PROBE: NEGATIVE
SARS-COV-2 RNA RESP QL NAA+PROBE: NEGATIVE

## 2022-12-30 DIAGNOSIS — F32.1 CURRENT MODERATE EPISODE OF MAJOR DEPRESSIVE DISORDER WITHOUT PRIOR EPISODE (HCC): ICD-10-CM

## 2022-12-31 RX ORDER — VENLAFAXINE HYDROCHLORIDE 150 MG/1
CAPSULE, EXTENDED RELEASE ORAL
Qty: 90 CAPSULE | Refills: 0 | Status: SHIPPED | OUTPATIENT
Start: 2022-12-31

## 2023-02-02 DIAGNOSIS — F31.31 BIPOLAR AFFECTIVE DISORDER, CURRENTLY DEPRESSED, MILD (HCC): ICD-10-CM

## 2023-02-02 RX ORDER — DIVALPROEX SODIUM 500 MG/1
TABLET, DELAYED RELEASE ORAL
Qty: 180 TABLET | Refills: 1 | Status: SHIPPED | OUTPATIENT
Start: 2023-02-02

## 2023-02-06 ENCOUNTER — APPOINTMENT (OUTPATIENT)
Dept: LAB | Facility: MEDICAL CENTER | Age: 73
End: 2023-02-06

## 2023-02-06 DIAGNOSIS — R53.83 OTHER FATIGUE: ICD-10-CM

## 2023-02-06 LAB
ALBUMIN SERPL BCP-MCNC: 3.2 G/DL (ref 3.5–5)
ALP SERPL-CCNC: 57 U/L (ref 46–116)
ALT SERPL W P-5'-P-CCNC: 23 U/L (ref 12–78)
ANION GAP SERPL CALCULATED.3IONS-SCNC: 6 MMOL/L (ref 4–13)
AST SERPL W P-5'-P-CCNC: 19 U/L (ref 5–45)
BACTERIA UR QL AUTO: ABNORMAL /HPF
BASOPHILS # BLD AUTO: 0.04 THOUSANDS/ÂΜL (ref 0–0.1)
BASOPHILS NFR BLD AUTO: 1 % (ref 0–1)
BILIRUB SERPL-MCNC: 0.44 MG/DL (ref 0.2–1)
BILIRUB UR QL STRIP: NEGATIVE
BUN SERPL-MCNC: 26 MG/DL (ref 5–25)
CALCIUM ALBUM COR SERPL-MCNC: 10.2 MG/DL (ref 8.3–10.1)
CALCIUM SERPL-MCNC: 9.6 MG/DL (ref 8.3–10.1)
CHLORIDE SERPL-SCNC: 112 MMOL/L (ref 96–108)
CHOLEST SERPL-MCNC: 176 MG/DL
CLARITY UR: CLEAR
CO2 SERPL-SCNC: 27 MMOL/L (ref 21–32)
COLOR UR: YELLOW
CREAT SERPL-MCNC: 0.67 MG/DL (ref 0.6–1.3)
EOSINOPHIL # BLD AUTO: 0.14 THOUSAND/ÂΜL (ref 0–0.61)
EOSINOPHIL NFR BLD AUTO: 3 % (ref 0–6)
ERYTHROCYTE [DISTWIDTH] IN BLOOD BY AUTOMATED COUNT: 12.6 % (ref 11.6–15.1)
GFR SERPL CREATININE-BSD FRML MDRD: 87 ML/MIN/1.73SQ M
GLUCOSE P FAST SERPL-MCNC: 87 MG/DL (ref 65–99)
GLUCOSE UR STRIP-MCNC: NEGATIVE MG/DL
HCT VFR BLD AUTO: 50.3 % (ref 34.8–46.1)
HDLC SERPL-MCNC: 79 MG/DL
HGB BLD-MCNC: 15.8 G/DL (ref 11.5–15.4)
HGB UR QL STRIP.AUTO: ABNORMAL
IMM GRANULOCYTES # BLD AUTO: 0.02 THOUSAND/UL (ref 0–0.2)
IMM GRANULOCYTES NFR BLD AUTO: 0 % (ref 0–2)
KETONES UR STRIP-MCNC: NEGATIVE MG/DL
LDLC SERPL CALC-MCNC: 79 MG/DL (ref 0–100)
LEUKOCYTE ESTERASE UR QL STRIP: ABNORMAL
LYMPHOCYTES # BLD AUTO: 3.12 THOUSANDS/ÂΜL (ref 0.6–4.47)
LYMPHOCYTES NFR BLD AUTO: 59 % (ref 14–44)
MAGNESIUM SERPL-MCNC: 2.3 MG/DL (ref 1.6–2.6)
MCH RBC QN AUTO: 30.6 PG (ref 26.8–34.3)
MCHC RBC AUTO-ENTMCNC: 31.4 G/DL (ref 31.4–37.4)
MCV RBC AUTO: 98 FL (ref 82–98)
MONOCYTES # BLD AUTO: 0.39 THOUSAND/ÂΜL (ref 0.17–1.22)
MONOCYTES NFR BLD AUTO: 8 % (ref 4–12)
MUCOUS THREADS UR QL AUTO: ABNORMAL
NEUTROPHILS # BLD AUTO: 1.49 THOUSANDS/ÂΜL (ref 1.85–7.62)
NEUTS SEG NFR BLD AUTO: 29 % (ref 43–75)
NITRITE UR QL STRIP: NEGATIVE
NON-SQ EPI CELLS URNS QL MICRO: ABNORMAL /HPF
NRBC BLD AUTO-RTO: 0 /100 WBCS
PH UR STRIP.AUTO: 5.5 [PH]
PLATELET # BLD AUTO: 129 THOUSANDS/UL (ref 149–390)
PMV BLD AUTO: 10.8 FL (ref 8.9–12.7)
POTASSIUM SERPL-SCNC: 4.6 MMOL/L (ref 3.5–5.3)
PROT SERPL-MCNC: 6.3 G/DL (ref 6.4–8.4)
PROT UR STRIP-MCNC: ABNORMAL MG/DL
RBC # BLD AUTO: 5.16 MILLION/UL (ref 3.81–5.12)
RBC #/AREA URNS AUTO: ABNORMAL /HPF
SODIUM SERPL-SCNC: 145 MMOL/L (ref 135–147)
SP GR UR STRIP.AUTO: 1.02 (ref 1–1.03)
TRANS CELLS #/AREA URNS HPF: PRESENT /[HPF]
TRIGL SERPL-MCNC: 90 MG/DL
URATE SERPL-MCNC: 5.3 MG/DL (ref 2–7.5)
UROBILINOGEN UR STRIP-ACNC: <2 MG/DL
WBC # BLD AUTO: 5.2 THOUSAND/UL (ref 4.31–10.16)
WBC #/AREA URNS AUTO: ABNORMAL /HPF

## 2023-02-09 ENCOUNTER — OFFICE VISIT (OUTPATIENT)
Dept: GASTROENTEROLOGY | Facility: CLINIC | Age: 73
End: 2023-02-09

## 2023-02-09 VITALS
HEIGHT: 65 IN | WEIGHT: 196 LBS | DIASTOLIC BLOOD PRESSURE: 103 MMHG | HEART RATE: 81 BPM | SYSTOLIC BLOOD PRESSURE: 156 MMHG | BODY MASS INDEX: 32.65 KG/M2

## 2023-02-09 DIAGNOSIS — K21.9 GASTROESOPHAGEAL REFLUX DISEASE, UNSPECIFIED WHETHER ESOPHAGITIS PRESENT: ICD-10-CM

## 2023-02-09 DIAGNOSIS — E66.3 OVERWEIGHT: ICD-10-CM

## 2023-02-09 DIAGNOSIS — R03.0 ELEVATED BLOOD PRESSURE READING: ICD-10-CM

## 2023-02-09 DIAGNOSIS — D69.6 THROMBOCYTOPENIA (HCC): ICD-10-CM

## 2023-02-09 DIAGNOSIS — Z12.11 COLON CANCER SCREENING: ICD-10-CM

## 2023-02-09 DIAGNOSIS — R19.8 BORBORYGMUS: ICD-10-CM

## 2023-02-09 DIAGNOSIS — K31.89 GASTRIC NODULE: ICD-10-CM

## 2023-02-09 DIAGNOSIS — R10.13 EPIGASTRIC PAIN: Primary | ICD-10-CM

## 2023-02-09 NOTE — PATIENT INSTRUCTIONS
Next EGD in May of this year to check gastric nodule  Take 1 Pepcid a day for 2 to 3 weeks if no symptoms no heartburn stop it and use it as needed twice a day  Regarding the dicyclomine/Bentyl take it once a day for 2 to 3 weeks if you do well stop it and then use it as needed  You had a negative Cologuard in December 2021  At the very least you should repeat that in December 2024 unless you do a colonoscopy

## 2023-02-09 NOTE — PROGRESS NOTES
[facility] - Outpatient Follow-up Note  Radha Michaels 67 y o  female MRN: [id number]  Encounter: [id number]          ASSESSMENT AND PLAN:      1  Epigastric pain  Resolved    2  Gastroesophageal reflux disease, unspecified whether esophagitis present  Controlled on Pepcid twice a day will attempt to taper off    3  Gastric nodule  EGD in May as follow-up  - EGD; Future    4  Borborygmus  Resolved, will taper off the dicyclomine she will take 1 a day if she does well for 2 to 3 weeks she will stop it  5  Overweight  Eating better, portion control    6  Colon cancer screening  She did Cologuard December 2021 which was negative recommended repeat in 3 years  Unless colonoscopy is decided upon  7  Elevated blood pressure reading  We will follow, asymptomatic    8  Thrombocytopenia ([address] )  Improved from previous  Patient will follow-up in a year unless otherwise indicated     ______________________________________________________________________    SUBJECTIVE: Very pleasant 70-year-old lady presents for routine follow-up  She had previously had epigastric pain and reflux symptoms which have since resolved borborygmus was present which has resolved  He is eating better working on portion control and weight loss  She has no other particular symptoms  Recent CMP CBC unremarkable mildly depressed platelet count minimally elevated hemoglobin  REVIEW OF SYSTEMS IS OTHERWISE NEGATIVE        Historical Information   Past Medical History:   Diagnosis Date   • Anxiety    • Disease of thyroid gland    • GERD (gastroesophageal reflux disease)    • H/O bilateral hip replacements      Past Surgical History:   Procedure Laterality Date   • BACK SURGERY      lumbar spine   • HIP SURGERY Bilateral    • JOINT REPLACEMENT     • KNEE SURGERY Right    • TOTAL SHOULDER REPLACEMENT Right      Social History   Social History     Substance and Sexual Activity   Alcohol Use Yes    Comment: wine Social History     Substance and Sexual Activity   Drug Use Never     Social History     Tobacco Use   Smoking Status Never   Smokeless Tobacco Never     Family History   Problem Relation Age of Onset   • Lymphoma Mother    • No Known Problems Father    • No Known Problems Daughter    • No Known Problems Daughter    • No Known Problems Maternal Grandmother    • No Known Problems Maternal Grandfather    • No Known Problems Paternal Grandmother    • No Known Problems Paternal Grandfather    • No Known Problems Brother        Meds/Allergies       Current Outpatient Medications:   •  cyclobenzaprine (FLEXERIL) 10 mg tablet  •  dicyclomine (BENTYL) 10 mg capsule  •  divalproex sodium (DEPAKOTE) 500 mg EC tablet  •  famotidine (PEPCID) 20 mg tablet  •  levothyroxine 75 mcg tablet  •  multivitamin (THERAGRAN) TABS  •  pantoprazole (PROTONIX) 40 mg tablet  •  traZODone (DESYREL) 50 mg tablet  •  venlafaxine (EFFEXOR-XR) 150 mg 24 hr capsule  •  venlafaxine (EFFEXOR-XR) 75 mg 24 hr capsule  •  amoxicillin (AMOXIL) 500 mg capsule  •  sucralfate (CARAFATE) 1 g/10 mL suspension    Allergies   Allergen Reactions   • Clindamycin Hives           Objective     Blood pressure (!) 156/103, pulse 81, height 5' 5" (1 651 m), weight 88 9 kg (196 lb)  Body mass index is 32 62 kg/m²  PHYSICAL EXAM:      General Appearance:   Alert, cooperative, no distress   HEENT:   Normocephalic, atraumatic, anicteric      Neck:  Supple, symmetrical, trachea midline   Lungs:   Clear to auscultation bilaterally; no rales, rhonchi or wheezing; respirations unlabored    Heart[de-identified]   Regular rate and rhythm; no murmur, rub, or gallop     Abdomen:   Soft, non-tender, non-distended; normal bowel sounds; no masses, no organomegaly    Genitalia:   Deferred    Rectal:   Deferred    Extremities:  No cyanosis, clubbing or edema    Pulses:  2+ and symmetric    Skin:  No jaundice, rashes, or lesions    Lymph nodes:  No palpable cervical lymphadenopathy  Lab Results:   No visits with results within 1 Day(s) from this visit  Latest known visit with results is:   Appointment on 02/06/2023   Component Date Value   • Sodium 02/06/2023 145    • Potassium 02/06/2023 4 6    • Chloride 02/06/2023 112 (H)    • CO2 02/06/2023 27    • ANION GAP 02/06/2023 6    • BUN 02/06/2023 26 (H)    • Creatinine 02/06/2023 0 67    • Glucose, Fasting 02/06/2023 87    • Calcium 02/06/2023 9 6    • Corrected Calcium 02/06/2023 10 2 (H)    • AST 02/06/2023 19    • ALT 02/06/2023 23    • Alkaline Phosphatase 02/06/2023 57    • Total Protein 02/06/2023 6 3 (L)    • Albumin 02/06/2023 3 2 (L)    • Total Bilirubin 02/06/2023 0 44    • eGFR 02/06/2023 87    • WBC 02/06/2023 5 20    • RBC 02/06/2023 5 16 (H)    • Hemoglobin 02/06/2023 15 8 (H)    • Hematocrit 02/06/2023 50 3 (H)    • MCV 02/06/2023 98    • MCH 02/06/2023 30 6    • MCHC 02/06/2023 31 4    • RDW 02/06/2023 12 6    • MPV 02/06/2023 10 8    • Platelets 30/73/3578 129 (L)    • nRBC 02/06/2023 0    • Neutrophils Relative 02/06/2023 29 (L)    • Immat GRANS % 02/06/2023 0    • Lymphocytes Relative 02/06/2023 59 (H)    • Monocytes Relative 02/06/2023 8    • Eosinophils Relative 02/06/2023 3    • Basophils Relative 02/06/2023 1    • Neutrophils Absolute 02/06/2023 1 49 (L)    • Immature Grans Absolute 02/06/2023 0 02    • Lymphocytes Absolute 02/06/2023 3 12    • Monocytes Absolute 02/06/2023 0 39    • Eosinophils Absolute 02/06/2023 0 14    • Basophils Absolute 02/06/2023 0 04    • Magnesium 02/06/2023 2 3    • Uric Acid 02/06/2023 5 3    • Cholesterol 02/06/2023 176    • Triglycerides 02/06/2023 90    • HDL, Direct 02/06/2023 79    • LDL Calculated 02/06/2023 79          Radiology Results:   No results found

## 2023-02-13 ENCOUNTER — RA CDI HCC (OUTPATIENT)
Dept: OTHER | Facility: HOSPITAL | Age: 73
End: 2023-02-13

## 2023-02-14 NOTE — PROGRESS NOTES
[facility]  coding opportunities       Chart reviewed, no opportunity found:   [address]        Patients Insurance     Medicare Insurance: [employer]

## 2023-02-16 NOTE — PROGRESS NOTES
Name: Jennifer Arriaga      : 1950      MRN: [id number]  Encounter Provider: Oni Pruett MD  Encounter Date: 2023   Encounter department: [address] [place] [address] Place     1  Well adult exam    2  Hypothyroidism due to acquired atrophy of thyroid  Assessment & Plan:  Patient to continue current dose of thyroid medicine and recheck TSH in 6 months      3  Current moderate episode of major depressive disorder without prior episode [facility])  Assessment & Plan:  Patient to continue utilizing medical therapy as well and counseling sources as applicable for condition  If  suicidal thought or fear of suicide to contact 911 and seek help immediately  Meds reviewed and patient questions answered today      4  Gastroesophageal reflux disease without esophagitis  Assessment & Plan:  Patient to continue with present therapy and decrease caffeine, avoid ETOH and smoking to decrease acid production  Pt should also cease eating prior to bedtime and avoid excessive fluid intake prior to sleep  May use antacids as needed for breakthrough GERD  All pateint questions answered today about this condition  5  Bipolar affective disorder, currently depressed, mild ([facility] [address] )  Assessment & Plan:  Patient is stable  and will continue present plan of care and reassess at next routine visit  All questions about this problem from patient were answered today  Orders:  -     divalproex sodium (Depakote ER) 250 mg 24 hr tablet; Take 1 tablet (250 mg total) by mouth daily    6  Class 2 obesity without serious comorbidity in adult, unspecified BMI, unspecified obesity type  Assessment & Plan:  Patient to increase exercise and partake of a diet with less calories to promote  weight loss      7  Small B-cell lymphoma, unspecified body region [facility])  Assessment & Plan:  Patient is stable  and will continue present plan of care and reassess at next routine visit   All questions about this problem from patient were answered today  Orders:  -     Ambulatory Referral to Hematology / Oncology; Future    8  Essential tremor  -     Ambulatory Referral to Neurology; Future    9  Sacroiliitis, not elsewhere classified ([facility] [address] )      BMI Counseling: There is no height or weight on file to calculate BMI  The BMI is above normal  Nutrition recommendations include decreasing portion sizes, encouraging healthy choices of fruits and vegetables, decreasing fast food intake, consuming healthier snacks, limiting drinks that contain sugar, moderation in carbohydrate intake, increasing intake of lean protein, reducing intake of saturated and trans fat and reducing intake of cholesterol  Exercise recommendations include exercising 3-5 times per week  No pharmacotherapy was ordered  Patient referred to PCP  Rationale for BMI follow-up plan is due to patient being overweight or obese  Depression Screening and Follow-up Plan: Patient assessed for underlying major depression  Brief counseling provided and recommend additional follow-up/re-evaluation next office visit  Patient advised to follow-up with PCP for further management  Falls Plan of Care: balance, strength, and gait training instructions were provided  Home safety education provided  Urinary Incontinence Plan of Care: counseling topics discussed: practice Kegel (pelvic floor strengthening) exercises, use restroom every 2 hours, limit alcohol, caffeine, spicy foods, and acidic foods, limiting fluid intake 3-4 hours before bed, preventing constipation and limiting fluid intake to 60 oz  per day  Subjective     This 69-year-old female today for Medicare wellness and checkup on multiple medical problems  Patient with significant GERD that is now under control she sees Dr Sonia Goldsmith for this also has a history of B-cell lymphoma is looking to for follow-up with oncology for since he had seen her oncologist 3 years ago    Patient also with some bipolar disorder and hypothyroidism  Her lab work was reviewed today and she is doing very well  Patient also would like to see about getting off some of her medicines for her bipolar so were going to decrease her Effexor from 225 to the 150 and also began to change her Depakote from 500 to the 250 mg and we will see her back in 6 weeks  Discussed discussed with patient that if she is having some issues with her emotions and her thoughts she can always go back to which she was taking for that was working well this is strictly trying to see what medicines we can cut back and she can take less medication  Patient also complaining of having a tremor in both of her arms  She states at times it can be worse than others  As she does have some issues with trying to hold things with the tremor at times  We will see about having a referral to neurology for further evaluation of his tremor I had considered giving her some primidone with a trial but she is having more symptoms she states she has no gait issues but cannot rule out early Parkinson's disease      Review of Systems    Past Medical History:   Diagnosis Date   • Anxiety    • Disease of thyroid gland    • GERD (gastroesophageal reflux disease)    • H/O bilateral hip replacements      Past Surgical History:   Procedure Laterality Date   • BACK SURGERY      lumbar spine   • HIP SURGERY Bilateral    • JOINT REPLACEMENT     • KNEE SURGERY Right    • TOTAL SHOULDER REPLACEMENT Right      Family History   Problem Relation Age of Onset   • Lymphoma Mother    • No Known Problems Father    • No Known Problems Daughter    • No Known Problems Daughter    • No Known Problems Maternal Grandmother    • No Known Problems Maternal Grandfather    • No Known Problems Paternal Grandmother    • No Known Problems Paternal Grandfather    • No Known Problems Brother      Social History     Socioeconomic History   • Marital status: /Civil Union     Spouse name: None   • Number of children: None   • Years of education: None   • Highest education level: None   Occupational History   • None   Tobacco Use   • Smoking status: Never   • Smokeless tobacco: Never   Vaping Use   • Vaping Use: Never used   Substance and Sexual Activity   • Alcohol use: Yes     Comment: wine   • Drug use: Never   • Sexual activity: Not Currently   Other Topics Concern   • None   Social History Narrative   • None     Social Determinants of Health     Financial Resource Strain: Not on file   Food Insecurity: Not on file   Transportation Needs: Not on file   Physical Activity: Not on file   Stress: Not on file   Social Connections: Not on file   Intimate Partner Violence: Not on file   Housing Stability: Not on file     Current Outpatient Medications on File Prior to Visit   Medication Sig   • cyclobenzaprine (FLEXERIL) 10 mg tablet Take 1 tablet (10 mg total) by mouth 3 (three) times a day as needed for muscle spasms   • dicyclomine (BENTYL) 10 mg capsule Take 1 capsule (10 mg total) by mouth 4 (four) times a day (before meals and at bedtime) As needed for spasm   • famotidine (PEPCID) 20 mg tablet Take 1 tablet (20 mg total) by mouth 2 (two) times a day   • levothyroxine 75 mcg tablet Take 1 tablet (75 mcg total) by mouth daily in the early morning   • multivitamin (THERAGRAN) TABS Take 1 tablet by mouth daily   • pantoprazole (PROTONIX) 40 mg tablet Take 1 tablet (40 mg total) by mouth daily before breakfast   • traZODone (DESYREL) 50 mg tablet Take 1 tablet (50 mg total) by mouth daily at bedtime as needed for sleep   • venlafaxine (EFFEXOR-XR) 150 mg 24 hr capsule TAKE ONE CAPSULE BY MOUTH EVERY DAY   • [DISCONTINUED] divalproex sodium (DEPAKOTE) 500 mg EC tablet TAKE ONE TABLET BY MOUTH EVERY 12 HOURS   • [DISCONTINUED] venlafaxine (EFFEXOR-XR) 75 mg 24 hr capsule TAKE 1 CAPSULE BY MOUTH DAILY WITH BREAKFAST   • amoxicillin (AMOXIL) 500 mg capsule TAKE 4 CAPSULES 1 HOUR PRIOR TO DENTAL VISIT (Patient not taking: Reported on 7/18/2022) • sucralfate (CARAFATE) 1 g/10 mL suspension Take 10 mL (1 g total) by mouth in the morning and 10 mL (1 g total) at noon and 10 mL (1 g total) in the evening and 10 mL (1 g total) before bedtime   (Patient not taking: Reported on 2/9/2023)     Allergies   Allergen Reactions   • Clindamycin Hives     Immunization History   Administered Date(s) Administered   • COVID-19 MODERNA VACC 0 5 ML IM 01/28/2021, 03/05/2021, 11/10/2021   • H1N1 Inj 02/16/2010   • Influenza Quadrivalent Preservative Free 3 years and older IM 11/18/2015, 10/12/2016, 10/13/2017, 11/08/2018   • Influenza, high dose seasonal 0 7 mL 10/24/2021, 10/20/2022   • Influenza, seasonal, injectable 10/24/2006, 10/26/2007, 11/12/2008, 10/15/2009, 10/12/2010, 11/22/2011, 12/13/2012, 11/14/2013, 10/22/2014   • Pneumococcal Conjugate 13-Valent 11/18/2015   • Pneumococcal Polysaccharide PPV23 01/01/2004, 11/18/2014   • TD (adult) Preservative Free 12/07/2017   • Tdap 07/27/2007, 10/21/2019   • Zoster Vaccine Recombinant 12/04/2018, 10/28/2019   • influenza, trivalent, adjuvanted 10/21/2019       Objective     /74 (BP Location: Left arm, Patient Position: Sitting, Cuff Size: Standard)   Pulse 71   Temp 98 °F (36 7 °C)   Resp 18   Ht 5' 5" (1 651 m)   Wt 88 1 kg (194 lb 3 2 oz)   SpO2 96%   BMI 32 32 kg/m²     Physical Exam  Savannah Patel MD

## 2023-02-17 ENCOUNTER — OFFICE VISIT (OUTPATIENT)
Dept: FAMILY MEDICINE CLINIC | Facility: CLINIC | Age: 73
End: 2023-02-17

## 2023-02-17 VITALS
SYSTOLIC BLOOD PRESSURE: 126 MMHG | OXYGEN SATURATION: 96 % | DIASTOLIC BLOOD PRESSURE: 74 MMHG | HEART RATE: 71 BPM | TEMPERATURE: 98 F | RESPIRATION RATE: 18 BRPM | BODY MASS INDEX: 32.36 KG/M2 | WEIGHT: 194.2 LBS | HEIGHT: 65 IN

## 2023-02-17 DIAGNOSIS — G25.0 ESSENTIAL TREMOR: ICD-10-CM

## 2023-02-17 DIAGNOSIS — E66.9 CLASS 2 OBESITY WITHOUT SERIOUS COMORBIDITY IN ADULT, UNSPECIFIED BMI, UNSPECIFIED OBESITY TYPE: ICD-10-CM

## 2023-02-17 DIAGNOSIS — F32.1 CURRENT MODERATE EPISODE OF MAJOR DEPRESSIVE DISORDER WITHOUT PRIOR EPISODE (HCC): ICD-10-CM

## 2023-02-17 DIAGNOSIS — C83.00 SMALL B-CELL LYMPHOMA, UNSPECIFIED BODY REGION (HCC): ICD-10-CM

## 2023-02-17 DIAGNOSIS — Z00.00 WELL ADULT EXAM: Primary | ICD-10-CM

## 2023-02-17 DIAGNOSIS — E03.4 HYPOTHYROIDISM DUE TO ACQUIRED ATROPHY OF THYROID: ICD-10-CM

## 2023-02-17 DIAGNOSIS — M46.1 SACROILIITIS, NOT ELSEWHERE CLASSIFIED (HCC): ICD-10-CM

## 2023-02-17 DIAGNOSIS — F31.31 BIPOLAR AFFECTIVE DISORDER, CURRENTLY DEPRESSED, MILD (HCC): ICD-10-CM

## 2023-02-17 DIAGNOSIS — K21.9 GASTROESOPHAGEAL REFLUX DISEASE WITHOUT ESOPHAGITIS: ICD-10-CM

## 2023-02-17 RX ORDER — DIVALPROEX SODIUM 250 MG/1
250 TABLET, EXTENDED RELEASE ORAL DAILY
Qty: 90 TABLET | Refills: 1 | Status: SHIPPED | OUTPATIENT
Start: 2023-02-17

## 2023-02-17 NOTE — PATIENT INSTRUCTIONS
Medicare Preventive Visit Patient Instructions  Thank you for completing your Welcome to Medicare Visit or Medicare Annual Wellness Visit today  Your next wellness visit will be due in one year (2/18/2024)  The screening/preventive services that you may require over the next 5-10 years are detailed below  Some tests may not apply to you based off risk factors and/or age  Screening tests ordered at today's visit but not completed yet may show as past due  Also, please note that scanned in results may not display below  Preventive Screenings:  Service Recommendations Previous Testing/Comments   Colorectal Cancer Screening  * Colonoscopy    * Fecal Occult Blood Test (FOBT)/Fecal Immunochemical Test (FIT)  * Fecal DNA/Cologuard Test  * Flexible Sigmoidoscopy Age: 39-70 years old   Colonoscopy: every 10 years (may be performed more frequently if at higher risk)  OR  FOBT/FIT: every 1 year  OR  Cologuard: every 3 years  OR  Sigmoidoscopy: every 5 years  Screening may be recommended earlier than age 39 if at higher risk for colorectal cancer  Also, an individualized decision between you and your healthcare provider will decide whether screening between the ages of 74-80 would be appropriate  Colonoscopy: 12/17/2021  FOBT/FIT: Not on file  Cologuard: 12/17/2021  Sigmoidoscopy: Not on file    Screening Current     Breast Cancer Screening Age: 36 years old  Frequency: every 1-2 years  Not required if history of left and right mastectomy Mammogram: 05/16/2022    Screening Current   Cervical Cancer Screening Between the ages of 21-29, pap smear recommended once every 3 years  Between the ages of 33-67, can perform pap smear with HPV co-testing every 5 years     Recommendations may differ for women with a history of total hysterectomy, cervical cancer, or abnormal pap smears in past  Pap Smear: Not on file    Screening Not Indicated   Hepatitis C Screening Once for adults born between 1945 and 1965  More frequently in patients at high risk for Hepatitis C Hep C Antibody: 02/02/2022    Screening Current   Diabetes Screening 1-2 times per year if you're at risk for diabetes or have pre-diabetes Fasting glucose: 87 mg/dL (2/6/2023)  A1C: 5 4 % (7/12/2021)  Screening Current   Cholesterol Screening Once every 5 years if you don't have a lipid disorder  May order more often based on risk factors  Lipid panel: 02/06/2023    Screening Current     Other Preventive Screenings Covered by Medicare:  1  Abdominal Aortic Aneurysm (AAA) Screening: covered once if your at risk  You're considered to be at risk if you have a family history of AAA  2  Lung Cancer Screening: covers low dose CT scan once per year if you meet all of the following conditions: (1) Age 50-69; (2) No signs or symptoms of lung cancer; (3) Current smoker or have quit smoking within the last 15 years; (4) You have a tobacco smoking history of at least 20 pack years (packs per day multiplied by number of years you smoked); (5) You get a written order from a healthcare provider  3  Glaucoma Screening: covered annually if you're considered high risk: (1) You have diabetes OR (2) Family history of glaucoma OR (3)  aged 48 and older OR (3)  American aged 72 and older  3  Osteoporosis Screening: covered every 2 years if you meet one of the following conditions: (1) You're estrogen deficient and at risk for osteoporosis based off medical history and other findings; (2) Have a vertebral abnormality; (3) On glucocorticoid therapy for more than 3 months; (4) Have primary hyperparathyroidism; (5) On osteoporosis medications and need to assess response to drug therapy  · Last bone density test (DXA Scan): 08/02/2022   5  HIV Screening: covered annually if you're between the age of 15-65  Also covered annually if you are younger than 13 and older than 72 with risk factors for HIV infection   For pregnant patients, it is covered up to 3 times per pregnancy  Immunizations:  Immunization Recommendations   Influenza Vaccine Annual influenza vaccination during flu season is recommended for all persons aged >= 6 months who do not have contraindications   Pneumococcal Vaccine   * Pneumococcal conjugate vaccine = PCV13 (Prevnar 13), PCV15 (Vaxneuvance), PCV20 (Prevnar 20)  * Pneumococcal polysaccharide vaccine = PPSV23 (Pneumovax) Adults 25-60 years old: 1-3 doses may be recommended based on certain risk factors  Adults 72 years old: 1-2 doses may be recommended based off what pneumonia vaccine you previously received   Hepatitis B Vaccine 3 dose series if at intermediate or high risk (ex: diabetes, end stage renal disease, liver disease)   Tetanus (Td) Vaccine - COST NOT COVERED BY MEDICARE PART B Following completion of primary series, a booster dose should be given every 10 years to maintain immunity against tetanus  Td may also be given as tetanus wound prophylaxis  Tdap Vaccine - COST NOT COVERED BY MEDICARE PART B Recommended at least once for all adults  For pregnant patients, recommended with each pregnancy  Shingles Vaccine (Shingrix) - COST NOT COVERED BY MEDICARE PART B  2 shot series recommended in those aged 48 and above     Health Maintenance Due:      Topic Date Due   • Breast Cancer Screening: Mammogram  05/16/2023   • Colorectal Cancer Screening  12/17/2024   • Hepatitis C Screening  Completed     Immunizations Due:      Topic Date Due   • COVID-19 Vaccine (4 - Booster for Hillery Jennie series) 01/05/2022     Advance Directives   What are advance directives? Advance directives are legal documents that state your wishes and plans for medical care  These plans are made ahead of time in case you lose your ability to make decisions for yourself  Advance directives can apply to any medical decision, such as the treatments you want, and if you want to donate organs  What are the types of advance directives?   There are many types of advance directives, and each state has rules about how to use them  You may choose a combination of any of the following:  · Living will: This is a written record of the treatment you want  You can also choose which treatments you do not want, which to limit, and which to stop at a certain time  This includes surgery, medicine, IV fluid, and tube feedings  · Durable power of  for healthcare [facility]): This is a written record that states who you want to make healthcare choices for you when you are unable to make them for yourself  This person, called a proxy, is usually a family member or a friend  You may choose more than 1 proxy  · Do not resuscitate (DNR) order:  A DNR order is used in case your heart stops beating or you stop breathing  It is a request not to have certain forms of treatment, such as CPR  A DNR order may be included in other types of advance directives  · Medical directive: This covers the care that you want if you are in a coma, near death, or unable to make decisions for yourself  You can list the treatments you want for each condition  Treatment may include pain medicine, surgery, blood transfusions, dialysis, IV or tube feedings, and a ventilator (breathing machine)  · Values history: This document has questions about your views, beliefs, and how you feel and think about life  This information can help others choose the care that you would choose  Why are advance directives important? An advance directive helps you control your care  Although spoken wishes may be used, it is better to have your wishes written down  Spoken wishes can be misunderstood, or not followed  Treatments may be given even if you do not want them  An advance directive may make it easier for your family to make difficult choices about your care     Weight Management   Why it is important to manage your weight:  Being overweight increases your risk of health conditions such as heart disease, high blood pressure, type 2 diabetes, and certain types of cancer  It can also increase your risk for osteoarthritis, sleep apnea, and other respiratory problems  Aim for a slow, steady weight loss  Even a small amount of weight loss can lower your risk of health problems  How to lose weight safely:  A safe and healthy way to lose weight is to eat fewer calories and get regular exercise  You can lose up about 1 pound a week by decreasing the number of calories you eat by 500 calories each day  Healthy meal plan for weight management:  A healthy meal plan includes a variety of foods, contains fewer calories, and helps you stay healthy  A healthy meal plan includes the following:  · Eat whole-grain foods more often  A healthy meal plan should contain fiber  Fiber is the part of grains, fruits, and vegetables that is not broken down by your body  Whole-grain foods are healthy and provide extra fiber in your diet  Some examples of whole-grain foods are whole-wheat breads and pastas, oatmeal, brown rice, and bulgur  · Eat a variety of vegetables every day  Include dark, leafy greens such as spinach, kale, neil greens, and mustard greens  Eat yellow and orange vegetables such as carrots, sweet potatoes, and winter squash  · Eat a variety of fruits every day  Choose fresh or canned fruit (canned in its own juice or light syrup) instead of juice  Fruit juice has very little or no fiber  · Eat low-fat dairy foods  Drink fat-free (skim) milk or 1% milk  Eat fat-free yogurt and low-fat cottage cheese  Try low-fat cheeses such as mozzarella and other reduced-fat cheeses  · Choose meat and other protein foods that are low in fat  Choose beans or other legumes such as split peas or lentils  Choose fish, skinless poultry (chicken or turkey), or lean cuts of red meat (beef or pork)  Before you cook meat or poultry, cut off any visible fat  · Use less fat and oil  Try baking foods instead of frying them   Add less fat, such as margarine, sour cream, regular salad dressing and mayonnaise to foods  Eat fewer high-fat foods  Some examples of high-fat foods include french fries, doughnuts, ice cream, and cakes  · Eat fewer sweets  Limit foods and drinks that are high in sugar  This includes candy, cookies, regular soda, and sweetened drinks  Exercise:  Exercise at least 30 minutes per day on most days of the week  Some examples of exercise include walking, biking, dancing, and swimming  You can also fit in more physical activity by taking the stairs instead of the elevator or parking farther away from stores  Ask your healthcare provider about the best exercise plan for you  © Copyright [employer] 2018 Information is for End User's use only and may not be sold, redistributed or otherwise used for commercial purposes   All illustrations and images included in CareNotes® are the copyrighted property of A D A M , Inc  or [address] [place] [address]

## 2023-02-17 NOTE — PROGRESS NOTES
Assessment and Plan:     Problem List Items Addressed This Visit        Digestive    Gastroesophageal reflux disease without esophagitis     Patient to continue with present therapy and decrease caffeine, avoid ETOH and smoking to decrease acid production  Pt should also cease eating prior to bedtime and avoid excessive fluid intake prior to sleep  May use antacids as needed for breakthrough GERD  All pateint questions answered today about this condition  Endocrine    Hypothyroidism     Patient to continue current dose of thyroid medicine and recheck TSH in 6 months            Musculoskeletal and Integument    Sacroiliitis, not elsewhere classified ([facility] [place] [facility] )       Other    Current moderate episode of major depressive disorder without prior episode [facility])     Patient to continue utilizing medical therapy as well and counseling sources as applicable for condition  If  suicidal thought or fear of suicide to contact 911 and seek help immediately  Meds reviewed and patient questions answered today         Bipolar affective disorder, currently depressed, mild ([facility] [place] [facility] )     Patient is stable  and will continue present plan of care and reassess at next routine visit  All questions about this problem from patient were answered today  Relevant Medications    divalproex sodium (Depakote ER) 250 mg 24 hr tablet    Class 2 obesity without serious comorbidity in adult     Patient to increase exercise and partake of a diet with less calories to promote  weight loss         Small B-cell lymphoma ([facility] [place] [facility] )     Patient is stable  and will continue present plan of care and reassess at next routine visit  All questions about this problem from patient were answered today           Relevant Orders    Ambulatory Referral to Hematology / Oncology   Other Visit Diagnoses     Well adult exam    -  Primary    Essential tremor        Relevant Medications    divalproex sodium (Depakote ER) 250 mg 24 hr tablet    Other Relevant Orders Ambulatory Referral to Neurology           Preventive health issues were discussed with patient, and age appropriate screening tests were ordered as noted in patient's After Visit Summary  Personalized health advice and appropriate referrals for health education or preventive services given if needed, as noted in patient's After Visit Summary       History of Present Illness:     Patient presents for a Medicare Wellness Visit    HPI   Patient Care Team:  Missael Conn MD as PCP - General (Family Medicine)     Review of Systems:     Review of Systems     Problem List:     Patient Active Problem List   Diagnosis   • Hypothyroidism   • Current moderate episode of major depressive disorder without prior episode (HCC)   • Gastroesophageal reflux disease without esophagitis   • History of hip joint replacement by other means   • History of total knee arthroplasty, right   • Laryngopharyngeal reflux   • History of pulmonary embolism   • History of lumbosacral spine surgery   • Bipolar affective disorder, currently depressed, mild ([facility])   • Class 2 obesity without serious comorbidity in adult   • Small B-cell lymphoma ([facility] )   • Sacroiliitis, not elsewhere classified ([facility] )   • Thrombocytopenia ([facility] )   • High ankle sprain of left lower extremity      Past Medical and Surgical History:     Past Medical History:   Diagnosis Date   • Anxiety    • Disease of thyroid gland    • GERD (gastroesophageal reflux disease)    • H/O bilateral hip replacements      Past Surgical History:   Procedure Laterality Date   • BACK SURGERY      lumbar spine   • HIP SURGERY Bilateral    • JOINT REPLACEMENT     • KNEE SURGERY Right    • TOTAL SHOULDER REPLACEMENT Right       Family History:     Family History   Problem Relation Age of Onset   • Lymphoma Mother    • No Known Problems Father    • No Known Problems Daughter    • No Known Problems Daughter    • No Known Problems Maternal Grandmother    • No Known Problems Maternal Grandfather    • No Known Problems Paternal Grandmother    • No Known Problems Paternal Grandfather    • No Known Problems Brother       Social History:     Social History     Socioeconomic History   • Marital status: /Civil Union     Spouse name: None   • Number of children: None   • Years of education: None   • Highest education level: None   Occupational History   • None   Tobacco Use   • Smoking status: Never   • Smokeless tobacco: Never   Vaping Use   • Vaping Use: Never used   Substance and Sexual Activity   • Alcohol use: Yes     Comment: wine   • Drug use: Never   • Sexual activity: Not Currently   Other Topics Concern   • None   Social History Narrative   • None     Social Determinants of Health     Financial Resource Strain: Not on file   Food Insecurity: Not on file   Transportation Needs: Not on file   Physical Activity: Not on file   Stress: Not on file   Social Connections: Not on file   Intimate Partner Violence: Not on file   Housing Stability: Not on file      Medications and Allergies:     Current Outpatient Medications   Medication Sig Dispense Refill   • cyclobenzaprine (FLEXERIL) 10 mg tablet Take 1 tablet (10 mg total) by mouth 3 (three) times a day as needed for muscle spasms 30 tablet 0   • dicyclomine (BENTYL) 10 mg capsule Take 1 capsule (10 mg total) by mouth 4 (four) times a day (before meals and at bedtime) As needed for spasm 360 capsule 1   • divalproex sodium (Depakote ER) 250 mg 24 hr tablet Take 1 tablet (250 mg total) by mouth daily 90 tablet 1   • famotidine (PEPCID) 20 mg tablet Take 1 tablet (20 mg total) by mouth 2 (two) times a day 180 tablet 1   • levothyroxine 75 mcg tablet Take 1 tablet (75 mcg total) by mouth daily in the early morning 90 tablet 1   • multivitamin (THERAGRAN) TABS Take 1 tablet by mouth daily     • pantoprazole (PROTONIX) 40 mg tablet Take 1 tablet (40 mg total) by mouth daily before breakfast 90 tablet 1   • traZODone (DESYREL) 50 mg tablet Take 1 tablet (50 mg total) by mouth daily at bedtime as needed for sleep 90 tablet 1   • venlafaxine (EFFEXOR-XR) 150 mg 24 hr capsule TAKE ONE CAPSULE BY MOUTH EVERY DAY 90 capsule 0   • amoxicillin (AMOXIL) 500 mg capsule TAKE 4 CAPSULES 1 HOUR PRIOR TO DENTAL VISIT (Patient not taking: Reported on 7/18/2022)     • sucralfate (CARAFATE) 1 g/10 mL suspension Take 10 mL (1 g total) by mouth in the morning and 10 mL (1 g total) at noon and 10 mL (1 g total) in the evening and 10 mL (1 g total) before bedtime  (Patient not taking: Reported on 2/9/2023) 414 mL 1     No current facility-administered medications for this visit  Allergies   Allergen Reactions   • Clindamycin Hives      Immunizations:     Immunization History   Administered Date(s) Administered   • COVID-19 MODERNA VACC 0 5 ML IM 01/28/2021, 03/05/2021, 11/10/2021   • H1N1 Inj 02/16/2010   • Influenza Quadrivalent Preservative Free 3 years and older IM 11/18/2015, 10/12/2016, 10/13/2017, 11/08/2018   • Influenza, high dose seasonal 0 7 mL 10/24/2021, 10/20/2022   • Influenza, seasonal, injectable 10/24/2006, 10/26/2007, 11/12/2008, 10/15/2009, 10/12/2010, 11/22/2011, 12/13/2012, 11/14/2013, 10/22/2014   • Pneumococcal Conjugate 13-Valent 11/18/2015   • Pneumococcal Polysaccharide PPV23 01/01/2004, 11/18/2014   • TD (adult) Preservative Free 12/07/2017   • Tdap 07/27/2007, 10/21/2019   • Zoster Vaccine Recombinant 12/04/2018, 10/28/2019   • influenza, trivalent, adjuvanted 10/21/2019      Health Maintenance:         Topic Date Due   • Breast Cancer Screening: Mammogram  05/16/2023   • Colorectal Cancer Screening  12/17/2024   • Hepatitis C Screening  Completed         Topic Date Due   • COVID-19 Vaccine (4 - Booster for Bello Reap series) 01/05/2022      Medicare Screening Tests and Risk Assessments:     Gustavo Connelly is here for her Subsequent Wellness visit  Last Medicare Wellness visit information reviewed, patient interviewed and updates made to the record today        Health Risk Assessment:   Patient rates overall health as very good  Patient feels that their physical health rating is slightly better  Patient is satisfied with their life  Eyesight was rated as same  Hearing was rated as same  Patient feels that their emotional and mental health rating is same  Patients states they are never, rarely angry  Patient states they are never, rarely unusually tired/fatigued  Patient states that she has experienced weight loss or gain in last 6 months  Depression Screening:   PHQ-9 Score: 0      Fall Risk Screening: In the past year, patient has experienced: no history of falling in past year      Urinary Incontinence Screening:   Patient has not leaked urine accidently in the last six months  Home Safety:  Patient does not have trouble with stairs inside or outside of their home  Patient has working smoke alarms and has working carbon monoxide detector  Home safety hazards include: none  Nutrition:   Current diet is Other (please comment)  Low acid, no chocolate, no ice cream    Medications:   Patient is not currently taking any over-the-counter supplements  Patient is able to manage medications  Activities of Daily Living (ADLs)/Instrumental Activities of Daily Living (IADLs):   Walk and transfer into and out of bed and chair?: Yes  Dress and groom yourself?: Yes    Bathe or shower yourself?: Yes    Feed yourself?  Yes  Do your laundry/housekeeping?: Yes  Manage your money, pay your bills and track your expenses?: Yes  Make your own meals?: Yes    Do your own shopping?: Yes    Previous Hospitalizations:   Any hospitalizations or ED visits within the last 12 months?: No      Advance Care Planning:   Living will: No      Cognitive Screening:   Provider or family/friend/caregiver concerned regarding cognition?: No    PREVENTIVE SCREENINGS      Cardiovascular Screening:    General: Screening Current      Diabetes Screening:     General: Screening Current      Colorectal Cancer Screening:     General: Screening Current      Breast Cancer Screening:     General: Screening Current      Cervical Cancer Screening:    General: Screening Not Indicated      Lung Cancer Screening:     General: Screening Not Indicated      Hepatitis C Screening:    General: Screening Current    Screening, Brief Intervention, and Referral to Treatment (SBIRT)    Screening  Typical number of drinks in a day: 0  Typical number of drinks in a week: 0  Interpretation: Low risk drinking behavior  Single Item Drug Screening:  How often have you used an illegal drug (including marijuana) or a prescription medication for non-medical reasons in the past year? never    Single Item Drug Screen Score: 0  Interpretation: Negative screen for possible drug use disorder    No results found       Physical Exam:     /74 (BP Location: Left arm, Patient Position: Sitting, Cuff Size: Standard)   Pulse 71   Temp 98 °F (36 7 °C)   Resp 18   Ht 5' 5" (1 651 m)   Wt 88 1 kg (194 lb 3 2 oz)   SpO2 96%   BMI 32 32 kg/m²     Physical Exam     Juancarlos Ellis MD

## 2023-02-21 ENCOUNTER — TELEPHONE (OUTPATIENT)
Dept: HEMATOLOGY ONCOLOGY | Facility: CLINIC | Age: 73
End: 2023-02-21

## 2023-02-21 NOTE — TELEPHONE ENCOUNTER
Appointment Cancellation Or Reschedule     Person calling in Patient   If someone other than patient calling, are they listed on the communication consent form? N/A   Provider Dr Melburn Canavan   Office Visit Date and Time  3/14/23 9:40 AM   Office Visit Location [facility]   Did patient want to reschedule their office appointment? If so, when was it scheduled to? Yes, 3/15/23 9:40 AM   Did you have STAR scheduled for this appointment? No   Do you need STAR set up for your new appointment? If yes, please send to "PATIENT RIDESHARE" pool for STAR rescheduling N/A   Is this patient calling to reschedule an infusion appointment? No   When is their next infusion appointment? n/a   Is this patient a Chemo patient? No   Reason for Cancellation or Reschedule Scheduling conflict   Was No show policy reviewed with patient if patient canceling within 24 hours? No     If the patient is cancelling an appointment and needs their STAR Transport cancelled, please route to [facility]  If the patient is a treatment patient, please route this to the office nurse  If the patient is not on treatment, please route to the Clerical pool based on location  If the patient is a surgical oncology patient, please route to surg/onc clinical pool  Route message as high priority if same day cancellation 
[FreeTextEntry1] : Arterial US completed/ordered to examine b/l CEA and AAA: R CEA patent. L CEA  mild <50% stenosis. \par

## 2023-03-06 DIAGNOSIS — K21.9 GASTROESOPHAGEAL REFLUX DISEASE WITHOUT ESOPHAGITIS: ICD-10-CM

## 2023-03-06 RX ORDER — PANTOPRAZOLE SODIUM 40 MG/1
40 TABLET, DELAYED RELEASE ORAL
Qty: 90 TABLET | Refills: 1 | Status: SHIPPED | OUTPATIENT
Start: 2023-03-06

## 2023-03-13 DIAGNOSIS — K21.9 GASTROESOPHAGEAL REFLUX DISEASE, UNSPECIFIED WHETHER ESOPHAGITIS PRESENT: ICD-10-CM

## 2023-03-13 DIAGNOSIS — R10.13 EPIGASTRIC PAIN: ICD-10-CM

## 2023-03-13 RX ORDER — FAMOTIDINE 20 MG/1
20 TABLET, FILM COATED ORAL 2 TIMES DAILY
Qty: 180 TABLET | Refills: 1 | Status: SHIPPED | OUTPATIENT
Start: 2023-03-13

## 2023-03-15 ENCOUNTER — CONSULT (OUTPATIENT)
Dept: HEMATOLOGY ONCOLOGY | Facility: CLINIC | Age: 73
End: 2023-03-15

## 2023-03-15 VITALS
WEIGHT: 193.5 LBS | TEMPERATURE: 97.2 F | BODY MASS INDEX: 32.24 KG/M2 | DIASTOLIC BLOOD PRESSURE: 76 MMHG | SYSTOLIC BLOOD PRESSURE: 144 MMHG | OXYGEN SATURATION: 99 % | HEIGHT: 65 IN | RESPIRATION RATE: 21 BRPM | HEART RATE: 74 BPM

## 2023-03-15 DIAGNOSIS — D69.6 THROMBOCYTOPENIA (HCC): Primary | ICD-10-CM

## 2023-03-15 DIAGNOSIS — C83.00 SMALL B-CELL LYMPHOMA, UNSPECIFIED BODY REGION (HCC): ICD-10-CM

## 2023-03-15 NOTE — PROGRESS NOTES
Josie Wheeler  1950  [address] [facility] [place]  [address] [facility] [address]  [place] [address]  HEMATOLOGY/ONCOLOGY CONSULTATION REPORT    DISCUSSION/SUMMARY:    53-year-old female with good general health previously diagnosed with SLL elsewhere  Mrs Suma Quintero presently feels well and does not have any SLL related symptoms  Clinically there are no concerning findings, no adenopathy  The most recent blood work is relatively good/acceptable; platelet count is slightly decreased (no bruising or bleeding issues)  This office will look for information as far as the prior hematology work-up ([place])  At this time, the plan is to continue with surveillance  Patient will go for blood work now (baseline CLL/SLL work-up) and is to return in 1 year with repeat blood work before  The most recent CBC demonstrated elevated H/H levels  Patient denies any history of sleep apnea, hypoventilation, respiratory issues etc  The rest of the CBC parameters are good/WNL  Repeat CBC is pending, additional work-up may be necessary in the future  Mrs Suma Quintero knows to call the hematology/oncology office if there are any other questions or concerns  Carefully review your medication list and verify that the list is accurate and up-to-date  Please call the hematology/oncology office if there are medications missing from the list, medications on the list that you are not currently taking or if there is a dosage or instruction that is different from how you're taking that medication  Patient goals and areas of care: SLL/CLL surveillance  Barriers to care: none  Patient is able to self-care   ______________________________________________________________________________________    Chief Complaint   Patient presents with   • Consult   • CLL/SLL     History of Present Illness: 53-year-old female previously diagnosed with SLL recently transferring her care to [place] [address]      Ethelyn Bosworth was diagnosed with SLL years ago up in [facility]  Patient more recently moved to [place]; referred by her PCP  Patient states feeling well, baseline  Appetite is good, weight is stable  Activities are baseline  No respiratory issues  No fevers, chills or night sweats  No recurrent or persistent infections  No enlarged lymph nodes  No headaches, blurred vision or dizziness  Patient has had both hips replaced, no pain control issues  Routine health maintenance and medical care is up-to-date  Patient has received her COVID-vaccine  Review of Systems   Constitutional: Negative  HENT: Negative  Eyes: Negative  Respiratory: Negative  Cardiovascular: Negative  Gastrointestinal: Negative  Endocrine: Negative  Genitourinary: Negative  Musculoskeletal: Negative  Skin: Negative  Allergic/Immunologic: Negative  Neurological: Negative  Hematological: Negative  Psychiatric/Behavioral: Negative  All other systems reviewed and are negative      Patient Active Problem List   Diagnosis   • Hypothyroidism   • Current moderate episode of major depressive disorder without prior episode (HCC)   • Gastroesophageal reflux disease without esophagitis   • History of hip joint replacement by other means   • History of total knee arthroplasty, right   • Laryngopharyngeal reflux   • History of pulmonary embolism   • History of lumbosacral spine surgery   • Bipolar affective disorder, currently depressed, mild (HCC)   • Class 2 obesity without serious comorbidity in adult   • Small B-cell lymphoma (HCC)   • Sacroiliitis, not elsewhere classified ([facility] [address] )   • Thrombocytopenia ([facility] [address] )   • High ankle sprain of left lower extremity     Past Medical History:   Diagnosis Date   • Anxiety    • Disease of thyroid gland    • GERD (gastroesophageal reflux disease)    • H/O bilateral hip replacements      Past Surgical History:   Procedure Laterality Date   • BACK SURGERY      lumbar spine   • HIP SURGERY Bilateral    • JOINT REPLACEMENT     • KNEE SURGERY Right    • TOTAL SHOULDER REPLACEMENT Right    Past surgical history: No prior blood transfusions    OB/GYN: No postmenopausal bleeding, no other GYN problems, no breast problems, no abnormal mammograms     Family History   Problem Relation Age of Onset   • Lymphoma Mother    • No Known Problems Father    • No Known Problems Daughter    • No Known Problems Daughter    • No Known Problems Maternal Grandmother    • No Known Problems Maternal Grandfather    • No Known Problems Paternal Grandmother    • No Known Problems Paternal Grandfather    • No Known Problems Brother    Family history: No known familial or genetic diseases    Social History     Socioeconomic History   • Marital status: /Civil Union     Spouse name: Not on file   • Number of children: Not on file   • Years of education: Not on file   • Highest education level: Not on file   Occupational History   • Not on file   Tobacco Use   • Smoking status: Never   • Smokeless tobacco: Never   Vaping Use   • Vaping Use: Never used   Substance and Sexual Activity   • Alcohol use: Yes     Comment: wine   • Drug use: Never   • Sexual activity: Not Currently   Other Topics Concern   • Not on file   Social History Narrative   • Not on file     Social Determinants of Health     Financial Resource Strain: Not on file   Food Insecurity: Not on file   Transportation Needs: Not on file   Physical Activity: Not on file   Stress: Not on file   Social Connections: Not on file   Intimate Partner Violence: Not on file   Housing Stability: Not on file   Social history: No tobacco, alcohol or drug abuse, no toxic exposure    Current Outpatient Medications:   •  amoxicillin (AMOXIL) 500 mg capsule, , Disp: , Rfl:   •  cyclobenzaprine (FLEXERIL) 10 mg tablet, Take 1 tablet (10 mg total) by mouth 3 (three) times a day as needed for muscle spasms, Disp: 30 tablet, Rfl: 0  •  divalproex sodium (Depakote ER) 250 mg 24 hr tablet, Take 1 tablet (250 mg total) by mouth daily, Disp: 90 tablet, Rfl: 1  •  levothyroxine 75 mcg tablet, Take 1 tablet (75 mcg total) by mouth daily in the early morning, Disp: 90 tablet, Rfl: 1  •  multivitamin (THERAGRAN) TABS, Take 1 tablet by mouth daily, Disp: , Rfl:   •  pantoprazole (PROTONIX) 40 mg tablet, Take 1 tablet (40 mg total) by mouth daily before breakfast, Disp: 90 tablet, Rfl: 1  •  traZODone (DESYREL) 50 mg tablet, Take 1 tablet (50 mg total) by mouth daily at bedtime as needed for sleep, Disp: 90 tablet, Rfl: 1  •  venlafaxine (EFFEXOR-XR) 150 mg 24 hr capsule, TAKE ONE CAPSULE BY MOUTH EVERY DAY, Disp: 90 capsule, Rfl: 0  •  dicyclomine (BENTYL) 10 mg capsule, Take 1 capsule (10 mg total) by mouth 4 (four) times a day (before meals and at bedtime) As needed for spasm (Patient not taking: Reported on 3/15/2023), Disp: 360 capsule, Rfl: 1  •  famotidine (PEPCID) 20 mg tablet, Take 1 tablet (20 mg total) by mouth 2 (two) times a day (Patient not taking: Reported on 3/15/2023), Disp: 180 tablet, Rfl: 1  •  sucralfate (CARAFATE) 1 g/10 mL suspension, Take 10 mL (1 g total) by mouth in the morning and 10 mL (1 g total) at noon and 10 mL (1 g total) in the evening and 10 mL (1 g total) before bedtime  (Patient not taking: Reported on 2/9/2023), Disp: 414 mL, Rfl: 1    Allergies   Allergen Reactions   • Clindamycin Hives       Vitals:    03/15/23 0939   BP: 144/76   Pulse: 74   Resp: 21   Temp: (!) 97 2 °F (36 2 °C)   SpO2: 99%     Physical Exam  Constitutional:       Appearance: She is well-developed  HENT:      Head: Normocephalic and atraumatic  Right Ear: External ear normal       Left Ear: External ear normal    Eyes:      Conjunctiva/sclera: Conjunctivae normal       Pupils: Pupils are equal, round, and reactive to light  Cardiovascular:      Rate and Rhythm: Normal rate and regular rhythm  Heart sounds: Normal heart sounds     Pulmonary:      Effort: Pulmonary effort is normal       Breath sounds: Normal breath sounds  Comments: Clear bilaterally  Abdominal:      General: Bowel sounds are normal       Palpations: Abdomen is soft  Comments: + Bowel sounds, nontender, soft, no palpable enlarged spleen, no guarding   Musculoskeletal:         General: Normal range of motion  Cervical back: Normal range of motion and neck supple  Skin:     General: Skin is warm  Comments: Warm, moist, good color, no petechiae or ecchymoses   Neurological:      Mental Status: She is alert and oriented to person, place, and time  Deep Tendon Reflexes: Reflexes are normal and symmetric  Psychiatric:         Behavior: Behavior normal          Thought Content:  Thought content normal          Judgment: Judgment normal      Extremities: Lower extreme edema bilaterally, no cords, pulses are 1+  Lymphatics: Adenopathy in the neck, supraclavicular region, pre-/postauricular area, occipital area, axilla and groin bilaterally    Labs    2/6/2023 WBC = 5 2 hemoglobin = 15 8 hematocrit = 50 3 MCV = 98 platelet = 630 neutrophil = 29% lymphocyte = 59% monocyte = 8% eosinophil = 3% basophil = 1% BUN = 26 creatinine = 0 67 calcium = 9 6 AST = 19 ALT = 23 alkaline phosphatase = 57 total protein = 6 3 albumin = 3 2 total bilirubin = 0 44    Imaging    No pertinent imaging for review    Pathology    No pathology results for review

## 2023-04-04 ENCOUNTER — APPOINTMENT (OUTPATIENT)
Dept: LAB | Facility: MEDICAL CENTER | Age: 73
End: 2023-04-04

## 2023-04-04 ENCOUNTER — RA CDI HCC (OUTPATIENT)
Dept: OTHER | Facility: HOSPITAL | Age: 73
End: 2023-04-04

## 2023-04-04 DIAGNOSIS — C83.00 SMALL B-CELL LYMPHOMA, UNSPECIFIED BODY REGION (HCC): ICD-10-CM

## 2023-04-04 LAB
ALBUMIN SERPL BCP-MCNC: 3.3 G/DL (ref 3.5–5)
ALP SERPL-CCNC: 97 U/L (ref 46–116)
ALT SERPL W P-5'-P-CCNC: 21 U/L (ref 12–78)
ANION GAP SERPL CALCULATED.3IONS-SCNC: 2 MMOL/L (ref 4–13)
AST SERPL W P-5'-P-CCNC: 19 U/L (ref 5–45)
BASOPHILS # BLD AUTO: 0.04 THOUSANDS/ÂΜL (ref 0–0.1)
BASOPHILS NFR BLD AUTO: 1 % (ref 0–1)
BILIRUB SERPL-MCNC: 0.23 MG/DL (ref 0.2–1)
BUN SERPL-MCNC: 23 MG/DL (ref 5–25)
CALCIUM ALBUM COR SERPL-MCNC: 10.3 MG/DL (ref 8.3–10.1)
CALCIUM SERPL-MCNC: 9.7 MG/DL (ref 8.3–10.1)
CHLORIDE SERPL-SCNC: 110 MMOL/L (ref 96–108)
CO2 SERPL-SCNC: 28 MMOL/L (ref 21–32)
CREAT SERPL-MCNC: 0.78 MG/DL (ref 0.6–1.3)
EOSINOPHIL # BLD AUTO: 0.12 THOUSAND/ÂΜL (ref 0–0.61)
EOSINOPHIL NFR BLD AUTO: 2 % (ref 0–6)
ERYTHROCYTE [DISTWIDTH] IN BLOOD BY AUTOMATED COUNT: 12.6 % (ref 11.6–15.1)
GFR SERPL CREATININE-BSD FRML MDRD: 75 ML/MIN/1.73SQ M
GLUCOSE P FAST SERPL-MCNC: 121 MG/DL (ref 65–99)
HCT VFR BLD AUTO: 48.1 % (ref 34.8–46.1)
HGB BLD-MCNC: 15.2 G/DL (ref 11.5–15.4)
IMM GRANULOCYTES # BLD AUTO: 0.02 THOUSAND/UL (ref 0–0.2)
IMM GRANULOCYTES NFR BLD AUTO: 0 % (ref 0–2)
LDH SERPL-CCNC: 211 U/L (ref 81–234)
LYMPHOCYTES # BLD AUTO: 3.14 THOUSANDS/ÂΜL (ref 0.6–4.47)
LYMPHOCYTES NFR BLD AUTO: 60 % (ref 14–44)
MCH RBC QN AUTO: 30.8 PG (ref 26.8–34.3)
MCHC RBC AUTO-ENTMCNC: 31.6 G/DL (ref 31.4–37.4)
MCV RBC AUTO: 98 FL (ref 82–98)
MONOCYTES # BLD AUTO: 0.34 THOUSAND/ÂΜL (ref 0.17–1.22)
MONOCYTES NFR BLD AUTO: 7 % (ref 4–12)
NEUTROPHILS # BLD AUTO: 1.6 THOUSANDS/ÂΜL (ref 1.85–7.62)
NEUTS SEG NFR BLD AUTO: 30 % (ref 43–75)
NRBC BLD AUTO-RTO: 0 /100 WBCS
PLATELET # BLD AUTO: 133 THOUSANDS/UL (ref 149–390)
PMV BLD AUTO: 10.6 FL (ref 8.9–12.7)
POTASSIUM SERPL-SCNC: 5.1 MMOL/L (ref 3.5–5.3)
PROT SERPL-MCNC: 6.4 G/DL (ref 6.4–8.4)
RBC # BLD AUTO: 4.93 MILLION/UL (ref 3.81–5.12)
SODIUM SERPL-SCNC: 140 MMOL/L (ref 135–147)
WBC # BLD AUTO: 5.26 THOUSAND/UL (ref 4.31–10.16)

## 2023-04-05 ENCOUNTER — OFFICE VISIT (OUTPATIENT)
Dept: FAMILY MEDICINE CLINIC | Facility: CLINIC | Age: 73
End: 2023-04-05

## 2023-04-05 VITALS
SYSTOLIC BLOOD PRESSURE: 128 MMHG | TEMPERATURE: 98.2 F | WEIGHT: 196 LBS | DIASTOLIC BLOOD PRESSURE: 88 MMHG | OXYGEN SATURATION: 98 % | HEART RATE: 75 BPM | BODY MASS INDEX: 32.65 KG/M2 | HEIGHT: 65 IN

## 2023-04-05 DIAGNOSIS — F31.31 BIPOLAR AFFECTIVE DISORDER, CURRENTLY DEPRESSED, MILD (HCC): ICD-10-CM

## 2023-04-05 DIAGNOSIS — K21.9 GASTROESOPHAGEAL REFLUX DISEASE WITHOUT ESOPHAGITIS: ICD-10-CM

## 2023-04-05 DIAGNOSIS — E03.4 HYPOTHYROIDISM DUE TO ACQUIRED ATROPHY OF THYROID: ICD-10-CM

## 2023-04-05 DIAGNOSIS — Z12.31 ENCOUNTER FOR SCREENING MAMMOGRAM FOR BREAST CANCER: Primary | ICD-10-CM

## 2023-04-05 DIAGNOSIS — F32.1 CURRENT MODERATE EPISODE OF MAJOR DEPRESSIVE DISORDER WITHOUT PRIOR EPISODE (HCC): ICD-10-CM

## 2023-04-05 DIAGNOSIS — C83.00 SMALL B-CELL LYMPHOMA, UNSPECIFIED BODY REGION (HCC): ICD-10-CM

## 2023-04-05 DIAGNOSIS — E66.9 CLASS 2 OBESITY WITHOUT SERIOUS COMORBIDITY IN ADULT, UNSPECIFIED BMI, UNSPECIFIED OBESITY TYPE: ICD-10-CM

## 2023-04-05 LAB
ALBUMIN SERPL ELPH-MCNC: 4.36 G/DL (ref 3.5–5)
ALBUMIN SERPL ELPH-MCNC: 70.3 % (ref 52–65)
ALPHA1 GLOB SERPL ELPH-MCNC: 0.24 G/DL (ref 0.1–0.4)
ALPHA1 GLOB SERPL ELPH-MCNC: 3.9 % (ref 2.5–5)
ALPHA2 GLOB SERPL ELPH-MCNC: 0.52 G/DL (ref 0.4–1.2)
ALPHA2 GLOB SERPL ELPH-MCNC: 8.4 % (ref 7–13)
BETA GLOB ABNORMAL SERPL ELPH-MCNC: 0.33 G/DL (ref 0.4–0.8)
BETA1 GLOB SERPL ELPH-MCNC: 5.3 % (ref 5–13)
BETA2 GLOB SERPL ELPH-MCNC: 2.9 % (ref 2–8)
BETA2+GAMMA GLOB SERPL ELPH-MCNC: 0.18 G/DL (ref 0.2–0.5)
GAMMA GLOB ABNORMAL SERPL ELPH-MCNC: 0.57 G/DL (ref 0.5–1.6)
GAMMA GLOB SERPL ELPH-MCNC: 9.2 % (ref 12–22)
IGG/ALB SER: 2.37 {RATIO} (ref 1.1–1.8)
INTERPRETATION UR IFE-IMP: NORMAL
PROT PATTERN SERPL ELPH-IMP: ABNORMAL
PROT SERPL-MCNC: 6.2 G/DL (ref 6.4–8.2)
SCAN RESULT: NORMAL

## 2023-04-05 RX ORDER — DIVALPROEX SODIUM 500 MG/1
500 TABLET, EXTENDED RELEASE ORAL DAILY
Qty: 90 TABLET | Refills: 1 | Status: SHIPPED | OUTPATIENT
Start: 2023-04-05

## 2023-04-05 NOTE — PROGRESS NOTES
[facility]  coding opportunities       Chart reviewed, no opportunity found:   [address]        Patients Insurance     Medicare Insurance: [employer]

## 2023-04-05 NOTE — PROGRESS NOTES
Name: Yarely Kerr      : 1950      MRN: [id number]  Encounter Provider: Beatriz Nice MD  Encounter Date: 2023   Encounter department: [address] [place] [address] Place     1  Encounter for screening mammogram for breast cancer  -     Mammo screening bilateral w 3d & cad; Future; Expected date: 2023    2  Hypothyroidism due to acquired atrophy of thyroid  Assessment & Plan:  Patient to continue current dose of thyroid medicine and recheck TSH in 6 months    Orders:  -     TSH, 3rd generation with Free T4 reflex; Future    3  Current moderate episode of major depressive disorder without prior episode [facility])  Assessment & Plan:  Patient to continue utilizing medical therapy as well and counseling sources as applicable for condition  If  suicidal thought or fear of suicide to contact 911 and seek help immediately  Meds reviewed and patient questions answered today      4  Gastroesophageal reflux disease without esophagitis  Assessment & Plan:  Patient to continue with present therapy and decrease caffeine, avoid ETOH and smoking to decrease acid production  Pt should also cease eating prior to bedtime and avoid excessive fluid intake prior to sleep  May use antacids as needed for breakthrough GERD  All pateint questions answered today about this condition  5  Bipolar affective disorder, currently depressed, mild ([facility] [address] )  Assessment & Plan:  Patient is stable  and will continue present plan of care and reassess at next routine visit  All questions about this problem from patient were answered today  Orders:  -     divalproex sodium (Depakote ER) 500 mg 24 hr tablet; Take 1 tablet (500 mg total) by mouth daily    6  Class 2 obesity without serious comorbidity in adult, unspecified BMI, unspecified obesity type  Assessment & Plan:  Patient to increase exercise and partake of a diet with less calories to promote  weight loss      7   Small B-cell lymphoma, unspecified body region [facility])  Assessment & Plan:  Patient is stable  and will continue present plan of care and reassess at next routine visit  All questions about this problem from patient were answered today  Depression Screening and Follow-up Plan: Patient assessed for underlying major depression  Brief counseling provided and recommend additional follow-up/re-evaluation next office visit  Patient advised to follow-up with PCP for further management  Falls Plan of Care: balance, strength, and gait training instructions were provided  Home safety education provided  Urinary Incontinence Plan of Care: counseling topics discussed: practice Kegel (pelvic floor strengthening) exercises, use restroom every 2 hours, limit alcohol, caffeine, spicy foods, and acidic foods, limiting fluid intake 3-4 hours before bed, weight loss, preventing constipation and limiting fluid intake to 60 oz  per day  Subjective     This 69-year-old female today for checkup on multimedical problems patient with hypothyroidism hypertension history of bipolar disorder and is here for follow-up  Patient recently had a trial on decreasing her Depakote and she did not do very well she had a lot of mood instability and put herself back on the 500 mg is to 250 that we were giving her and she is doing much better  Patient also has some GERD and is taking both her Protonix and her Pepcid and is stable with that and may need to see GI if this is becoming more a problem for her  Patient is due for thyroid lab work in August I ordered lab work for then I will see her back in August for her next checkup  Review of Systems   Constitutional: Negative for activity change, appetite change, fatigue and fever  HENT: Negative for congestion, ear pain, postnasal drip, rhinorrhea, sinus pressure, sinus pain, sneezing and sore throat  Eyes: Negative for pain and redness     Respiratory: Negative for apnea, cough, chest tightness, shortness of breath and wheezing  Cardiovascular: Negative for chest pain, palpitations and leg swelling  Gastrointestinal: Negative for abdominal pain, constipation, diarrhea, nausea and vomiting  Endocrine: Negative for cold intolerance and heat intolerance  Genitourinary: Negative for difficulty urinating, dysuria, frequency, hematuria and urgency  Musculoskeletal: Negative for arthralgias, back pain, gait problem and myalgias  Skin: Negative for rash  Neurological: Negative for dizziness, speech difficulty, weakness, numbness and headaches  Hematological: Does not bruise/bleed easily  Psychiatric/Behavioral: Negative for agitation, confusion and hallucinations         Past Medical History:   Diagnosis Date   • Anxiety    • Disease of thyroid gland    • GERD (gastroesophageal reflux disease)    • H/O bilateral hip replacements      Past Surgical History:   Procedure Laterality Date   • BACK SURGERY      lumbar spine   • HIP SURGERY Bilateral    • JOINT REPLACEMENT     • KNEE SURGERY Right    • TOTAL SHOULDER REPLACEMENT Right      Family History   Problem Relation Age of Onset   • Lymphoma Mother    • No Known Problems Father    • No Known Problems Daughter    • No Known Problems Daughter    • No Known Problems Maternal Grandmother    • No Known Problems Maternal Grandfather    • No Known Problems Paternal Grandmother    • No Known Problems Paternal Grandfather    • No Known Problems Brother      Social History     Socioeconomic History   • Marital status: /Civil Union     Spouse name: None   • Number of children: None   • Years of education: None   • Highest education level: None   Occupational History   • None   Tobacco Use   • Smoking status: Never   • Smokeless tobacco: Never   Vaping Use   • Vaping Use: Never used   Substance and Sexual Activity   • Alcohol use: Yes     Comment: wine   • Drug use: Never   • Sexual activity: Not Currently   Other Topics Concern   • None   Social History Narrative • None     Social Determinants of Health     Financial Resource Strain: Not on file   Food Insecurity: Not on file   Transportation Needs: Not on file   Physical Activity: Not on file   Stress: Not on file   Social Connections: Not on file   Intimate Partner Violence: Not on file   Housing Stability: Not on file     Current Outpatient Medications on File Prior to Visit   Medication Sig   • cyclobenzaprine (FLEXERIL) 10 mg tablet Take 1 tablet (10 mg total) by mouth 3 (three) times a day as needed for muscle spasms   • famotidine (PEPCID) 20 mg tablet Take 1 tablet (20 mg total) by mouth 2 (two) times a day   • levothyroxine 75 mcg tablet Take 1 tablet (75 mcg total) by mouth daily in the early morning   • multivitamin (THERAGRAN) TABS Take 1 tablet by mouth daily   • pantoprazole (PROTONIX) 40 mg tablet Take 1 tablet (40 mg total) by mouth daily before breakfast   • traZODone (DESYREL) 50 mg tablet Take 1 tablet (50 mg total) by mouth daily at bedtime as needed for sleep   • venlafaxine (EFFEXOR-XR) 150 mg 24 hr capsule TAKE ONE CAPSULE BY MOUTH EVERY DAY   • [DISCONTINUED] divalproex sodium (Depakote ER) 250 mg 24 hr tablet Take 1 tablet (250 mg total) by mouth daily   • amoxicillin (AMOXIL) 500 mg capsule    • [DISCONTINUED] dicyclomine (BENTYL) 10 mg capsule Take 1 capsule (10 mg total) by mouth 4 (four) times a day (before meals and at bedtime) As needed for spasm (Patient not taking: Reported on 3/15/2023)   • [DISCONTINUED] sucralfate (CARAFATE) 1 g/10 mL suspension Take 10 mL (1 g total) by mouth in the morning and 10 mL (1 g total) at noon and 10 mL (1 g total) in the evening and 10 mL (1 g total) before bedtime   (Patient not taking: Reported on 2/9/2023)     Allergies   Allergen Reactions   • Clindamycin Hives     Immunization History   Administered Date(s) Administered   • COVID-19 MODERNA VACC 0 5 ML IM 01/28/2021, 03/05/2021, 11/10/2021   • H1N1 Inj 02/16/2010   • INFLUENZA 10/20/2022   • Influenza "Quadrivalent Preservative Free 3 years and older IM 11/18/2015, 10/12/2016, 10/13/2017, 11/08/2018   • Influenza, high dose seasonal 0 7 mL 10/24/2021, 10/20/2022   • Influenza, seasonal, injectable 10/24/2006, 10/26/2007, 11/12/2008, 10/15/2009, 10/12/2010, 11/22/2011, 12/13/2012, 11/14/2013, 10/22/2014   • Pneumococcal Conjugate 13-Valent 11/18/2015   • Pneumococcal Polysaccharide PPV23 01/01/2004, 11/18/2014   • TD (adult) Preservative Free 12/07/2017   • Tdap 07/27/2007, 10/21/2019   • Zoster Vaccine Recombinant 12/04/2018, 10/28/2019   • influenza, trivalent, adjuvanted 10/21/2019       Objective     /88 (BP Location: Left arm, Patient Position: Sitting, Cuff Size: Large)   Pulse 75   Temp 98 2 °F (36 8 °C)   Ht 5' 5\" (1 651 m)   Wt 88 9 kg (196 lb)   SpO2 98%   BMI 32 62 kg/m²     Physical Exam  Vitals and nursing note reviewed  Constitutional:       Appearance: She is well-developed  HENT:      Head: Normocephalic and atraumatic  Nose: Nose normal       Mouth/Throat:      Mouth: Mucous membranes are moist    Eyes:      General: No scleral icterus  Conjunctiva/sclera: Conjunctivae normal       Pupils: Pupils are equal, round, and reactive to light  Neck:      Thyroid: No thyromegaly  Cardiovascular:      Rate and Rhythm: Normal rate and regular rhythm  Pulmonary:      Effort: Pulmonary effort is normal       Breath sounds: Normal breath sounds  No wheezing  Abdominal:      General: Bowel sounds are normal  There is no distension  Palpations: Abdomen is soft  Tenderness: There is no abdominal tenderness  There is no guarding or rebound  Musculoskeletal:         General: No tenderness or deformity  Normal range of motion  Cervical back: Normal range of motion and neck supple  Skin:     General: Skin is warm and dry  Findings: No erythema or rash  Neurological:      Mental Status: She is alert and oriented to person, place, and time        Sensory: No " sensory deficit  Psychiatric:         Behavior: Behavior normal          Thought Content:  Thought content normal          Judgment: Judgment normal        Fadi Choi MD

## 2023-04-07 ENCOUNTER — OFFICE VISIT (OUTPATIENT)
Dept: URGENT CARE | Facility: MEDICAL CENTER | Age: 73
End: 2023-04-07

## 2023-04-07 VITALS
SYSTOLIC BLOOD PRESSURE: 122 MMHG | HEART RATE: 88 BPM | HEIGHT: 65 IN | TEMPERATURE: 98 F | RESPIRATION RATE: 18 BRPM | OXYGEN SATURATION: 99 % | WEIGHT: 196 LBS | BODY MASS INDEX: 32.65 KG/M2 | DIASTOLIC BLOOD PRESSURE: 72 MMHG

## 2023-04-07 DIAGNOSIS — N30.01 ACUTE CYSTITIS WITH HEMATURIA: Primary | ICD-10-CM

## 2023-04-07 DIAGNOSIS — R10.31 RIGHT LOWER QUADRANT ABDOMINAL PAIN: ICD-10-CM

## 2023-04-07 LAB
SL AMB  POCT GLUCOSE, UA: ABNORMAL
SL AMB LEUKOCYTE ESTERASE,UA: ABNORMAL
SL AMB POCT BILIRUBIN,UA: ABNORMAL
SL AMB POCT BLOOD,UA: ABNORMAL
SL AMB POCT CLARITY,UA: ABNORMAL
SL AMB POCT COLOR,UA: YELLOW
SL AMB POCT KETONES,UA: ABNORMAL
SL AMB POCT NITRITE,UA: ABNORMAL
SL AMB POCT PH,UA: 7.5
SL AMB POCT SPECIFIC GRAVITY,UA: 1.01
SL AMB POCT URINE PROTEIN: 7.5
SL AMB POCT UROBILINOGEN: 0.2

## 2023-04-07 RX ORDER — CEPHALEXIN 500 MG/1
500 CAPSULE ORAL EVERY 12 HOURS SCHEDULED
Qty: 14 CAPSULE | Refills: 0 | Status: SHIPPED | OUTPATIENT
Start: 2023-04-07 | End: 2023-04-14

## 2023-04-07 NOTE — PROGRESS NOTES
[facility]        NAME: Christian Ortiz is a 67 y o  female  : 1950    MRN: [id number]  DATE: 2023  TIME: 7:16 PM    Assessment and Plan   Acute cystitis with hematuria [N30 01]  1  Acute cystitis with hematuria  cephalexin (KEFLEX) 500 mg capsule      2  Right lower quadrant abdominal pain  POCT urine dip    Urine culture        Urine dip in office is indicative for UTI  Will treat with cephalexin  Urine to be sent for culture  Advised patient that we are unable to complete imaging in urgent care  If pain worsens, she develops a fever, vomiting, or changed in bowel or bladder she is to go to the ER for further evaluation  She is agreeable with plan  Patient Instructions       Follow up with PCP in 3-5 days  Proceed to  ER if symptoms worsen  Chief Complaint     Chief Complaint   Patient presents with   • Abdominal Pain     RLQ pain; patient states she had a bowel movement this AM and had right lower quadrant pain since that times; denies hematuria, urgency, burning but states she always has urinary frequency          History of Present Illness       Patient is a 67year old female presenting with RLQ pain that radiates to right flank  Pain started this morning  She has normal urinary frequency that is unchanged  Denies fever, chills, hematuria, or dysuria  Denies N/V/D  States that her urine here today was cloudy  Last BM was this morning and normal  Normal appetite  No OTC medications  Review of Systems   Review of Systems   Constitutional: Negative for activity change, chills and fever  Gastrointestinal: Positive for abdominal pain  Negative for diarrhea, nausea and vomiting  Genitourinary: Positive for frequency  Negative for dysuria, hematuria and urgency           Current Medications       Current Outpatient Medications:   •  cephalexin (KEFLEX) 500 mg capsule, Take 1 capsule (500 mg total) by mouth every 12 (twelve) hours for 7 days, Disp: 14 capsule, Rfl: 0  • amoxicillin (AMOXIL) 500 mg capsule, , Disp: , Rfl:   •  cyclobenzaprine (FLEXERIL) 10 mg tablet, Take 1 tablet (10 mg total) by mouth 3 (three) times a day as needed for muscle spasms, Disp: 30 tablet, Rfl: 0  •  divalproex sodium (Depakote ER) 500 mg 24 hr tablet, Take 1 tablet (500 mg total) by mouth daily, Disp: 90 tablet, Rfl: 1  •  famotidine (PEPCID) 20 mg tablet, Take 1 tablet (20 mg total) by mouth 2 (two) times a day, Disp: 180 tablet, Rfl: 1  •  levothyroxine 75 mcg tablet, Take 1 tablet (75 mcg total) by mouth daily in the early morning, Disp: 90 tablet, Rfl: 1  •  multivitamin (THERAGRAN) TABS, Take 1 tablet by mouth daily, Disp: , Rfl:   •  pantoprazole (PROTONIX) 40 mg tablet, Take 1 tablet (40 mg total) by mouth daily before breakfast, Disp: 90 tablet, Rfl: 1  •  traZODone (DESYREL) 50 mg tablet, Take 1 tablet (50 mg total) by mouth daily at bedtime as needed for sleep, Disp: 90 tablet, Rfl: 1  •  venlafaxine (EFFEXOR-XR) 150 mg 24 hr capsule, TAKE ONE CAPSULE BY MOUTH EVERY DAY, Disp: 90 capsule, Rfl: 0    Current Allergies     Allergies as of 04/07/2023 - Reviewed 04/07/2023   Allergen Reaction Noted   • Clindamycin Hives 09/21/2010            The following portions of the patient's history were reviewed and updated as appropriate: allergies, current medications, past family history, past medical history, past social history, past surgical history and problem list      Past Medical History:   Diagnosis Date   • Anxiety    • Disease of thyroid gland    • GERD (gastroesophageal reflux disease)    • H/O bilateral hip replacements        Past Surgical History:   Procedure Laterality Date   • BACK SURGERY      lumbar spine   • HIP SURGERY Bilateral    • JOINT REPLACEMENT     • KNEE SURGERY Right    • TOTAL SHOULDER REPLACEMENT Right        Family History   Problem Relation Age of Onset   • Lymphoma Mother    • No Known Problems Father    • No Known Problems Daughter    • No Known Problems Daughter    • "No Known Problems Maternal Grandmother    • No Known Problems Maternal Grandfather    • No Known Problems Paternal Grandmother    • No Known Problems Paternal Grandfather    • No Known Problems Brother          Medications have been verified  Objective   /72   Pulse 88   Temp 98 °F (36 7 °C) (Temporal)   Resp 18   Ht 5' 5\" (1 651 m)   Wt 88 9 kg (196 lb)   SpO2 99%   BMI 32 62 kg/m²        Component 4/7/23 1722    LEUKOCYTE ESTERASE,UA large    NITRITE,UA pos    SL AMB POCT UROBILINOGEN 0 2    POCT URINE PROTEIN 7 5     PH,UA 7 5    BLOOD,UA large h    SPECIFIC GRAVITY,UA 1 010    KETONES,UA neg    BILIRUBIN,UA neg    GLUCOSE, UA neg     COLOR,UA yellow    CLARITY,UA cloudy        Physical Exam     Physical Exam  Vitals reviewed  Constitutional:       General: She is not in acute distress  Appearance: Normal appearance  She is well-developed  Cardiovascular:      Rate and Rhythm: Normal rate and regular rhythm  Heart sounds: Normal heart sounds, S1 normal and S2 normal    Pulmonary:      Effort: Pulmonary effort is normal       Breath sounds: Normal breath sounds  No decreased breath sounds, wheezing or rhonchi  Abdominal:      Palpations: Abdomen is soft  Tenderness: There is abdominal tenderness in the right lower quadrant  There is no right CVA tenderness or left CVA tenderness  Skin:     General: Skin is warm and moist    Neurological:      General: No focal deficit present  Mental Status: She is alert and oriented to person, place, and time                     "

## 2023-04-09 LAB — BACTERIA UR CULT: NORMAL

## 2023-04-10 PROBLEM — N20.1 RIGHT URETERAL STONE: Status: ACTIVE | Noted: 2023-04-10

## 2023-04-10 PROBLEM — N13.30 HYDRONEPHROSIS OF RIGHT KIDNEY: Status: ACTIVE | Noted: 2023-04-10

## 2023-04-11 ENCOUNTER — ANESTHESIA EVENT (OUTPATIENT)
Dept: PERIOP | Facility: HOSPITAL | Age: 73
End: 2023-04-11

## 2023-04-11 ENCOUNTER — ANESTHESIA (OUTPATIENT)
Dept: PERIOP | Facility: HOSPITAL | Age: 73
End: 2023-04-11

## 2023-04-11 RX ORDER — SODIUM CHLORIDE, SODIUM LACTATE, POTASSIUM CHLORIDE, CALCIUM CHLORIDE 600; 310; 30; 20 MG/100ML; MG/100ML; MG/100ML; MG/100ML
INJECTION, SOLUTION INTRAVENOUS CONTINUOUS PRN
Status: DISCONTINUED | OUTPATIENT
Start: 2023-04-11 | End: 2023-04-11

## 2023-04-11 RX ORDER — ONDANSETRON 2 MG/ML
INJECTION INTRAMUSCULAR; INTRAVENOUS AS NEEDED
Status: DISCONTINUED | OUTPATIENT
Start: 2023-04-11 | End: 2023-04-11

## 2023-04-11 RX ORDER — DEXAMETHASONE SODIUM PHOSPHATE 10 MG/ML
INJECTION, SOLUTION INTRAMUSCULAR; INTRAVENOUS AS NEEDED
Status: DISCONTINUED | OUTPATIENT
Start: 2023-04-11 | End: 2023-04-11

## 2023-04-11 RX ORDER — PROPOFOL 10 MG/ML
INJECTION, EMULSION INTRAVENOUS AS NEEDED
Status: DISCONTINUED | OUTPATIENT
Start: 2023-04-11 | End: 2023-04-11

## 2023-04-11 RX ORDER — FENTANYL CITRATE 50 UG/ML
INJECTION, SOLUTION INTRAMUSCULAR; INTRAVENOUS AS NEEDED
Status: DISCONTINUED | OUTPATIENT
Start: 2023-04-11 | End: 2023-04-11

## 2023-04-11 RX ADMIN — SODIUM CHLORIDE, SODIUM LACTATE, POTASSIUM CHLORIDE, AND CALCIUM CHLORIDE: .6; .31; .03; .02 INJECTION, SOLUTION INTRAVENOUS at 13:19

## 2023-04-11 RX ADMIN — ONDANSETRON 4 MG: 2 INJECTION INTRAMUSCULAR; INTRAVENOUS at 13:49

## 2023-04-11 RX ADMIN — CEFAZOLIN SODIUM 2000 MG: 2 SOLUTION INTRAVENOUS at 13:29

## 2023-04-11 RX ADMIN — FENTANYL CITRATE 50 MCG: 50 INJECTION, SOLUTION INTRAMUSCULAR; INTRAVENOUS at 13:49

## 2023-04-11 RX ADMIN — LIDOCAINE HYDROCHLORIDE 50 MG: 20 INJECTION, SOLUTION INTRAVENOUS at 13:37

## 2023-04-11 RX ADMIN — FENTANYL CITRATE 25 MCG: 50 INJECTION, SOLUTION INTRAMUSCULAR; INTRAVENOUS at 14:01

## 2023-04-11 RX ADMIN — DEXAMETHASONE SODIUM PHOSPHATE 10 MG: 10 INJECTION, SOLUTION INTRAMUSCULAR; INTRAVENOUS at 13:41

## 2023-04-11 RX ADMIN — PROPOFOL 150 MG: 10 INJECTION, EMULSION INTRAVENOUS at 13:37

## 2023-04-11 NOTE — ANESTHESIA PREPROCEDURE EVALUATION
Procedure:  CYSTOSCOPY URETEROSCOPY WITH LITHOTRIPSY HOLMIUM LASER, RETROGRADE PYELOGRAM AND INSERTION STENT URETERAL (Right: Bladder)    Relevant Problems   ENDO   (+) Hypothyroidism      GI/HEPATIC   (+) GERD (gastroesophageal reflux disease)      /RENAL   (+) Hydronephrosis of right kidney      HEMATOLOGY   (+) Small B-cell lymphoma (HCC)   (+) Thrombocytopenia (HCC)      MUSCULOSKELETAL   (+) Sacroiliitis, not elsewhere classified (HCC)      NEURO/PSYCH   (+) Anxiety   (+) Bipolar affective disorder, currently depressed, mild (HCC)   (+) Current moderate episode of major depressive disorder without prior episode (HCC)   (+) History of pulmonary embolism        Physical Exam    Airway    Mallampati score: II  TM Distance: >3 FB  Neck ROM: full     Dental   No notable dental hx     Cardiovascular  Cardiovascular exam normal    Pulmonary  Pulmonary exam normal     Other Findings        Anesthesia Plan  ASA Score- 3     Anesthesia Type- general with ASA Monitors  Additional Monitors:   Airway Plan: LMA  Plan Factors-Exercise tolerance (METS): >4 METS  Chart reviewed  EKG reviewed  Imaging results reviewed  Existing labs reviewed  Patient summary reviewed  Patient is not a current smoker  Patient not instructed to abstain from smoking on day of procedure  Patient did not smoke on day of surgery  Obstructive sleep apnea risk education given perioperatively  Induction-     Postoperative Plan-     Informed Consent- Anesthetic plan and risks discussed with patient  I personally reviewed this patient with the CRNA  Discussed and agreed on the Anesthesia Plan with the CRNA  Marquita Zavala

## 2023-05-02 NOTE — ANESTHESIA POSTPROCEDURE EVALUATION
Post-Op Assessment Note    CV Status:  Stable  Pain Score: 1    Pain management: adequate     Mental Status:  Alert   PONV Controlled:  None      Post Op Vitals Reviewed: Yes      Staff: Anesthesiologist         No notable events documented      BP      Temp     Pulse     Resp      SpO2

## 2023-05-03 ENCOUNTER — ANESTHESIA EVENT (OUTPATIENT)
Dept: GASTROENTEROLOGY | Facility: HOSPITAL | Age: 73
End: 2023-05-03

## 2023-05-03 ENCOUNTER — ANESTHESIA (OUTPATIENT)
Dept: GASTROENTEROLOGY | Facility: HOSPITAL | Age: 73
End: 2023-05-03

## 2023-05-03 ENCOUNTER — HOSPITAL ENCOUNTER (OUTPATIENT)
Dept: GASTROENTEROLOGY | Facility: HOSPITAL | Age: 73
Setting detail: OUTPATIENT SURGERY
Discharge: HOME | End: 2023-05-03
Attending: INTERNAL MEDICINE | Admitting: INTERNAL MEDICINE

## 2023-05-03 VITALS
WEIGHT: 195 LBS | RESPIRATION RATE: 12 BRPM | SYSTOLIC BLOOD PRESSURE: 126 MMHG | TEMPERATURE: 97.5 F | HEART RATE: 68 BPM | BODY MASS INDEX: 32.49 KG/M2 | DIASTOLIC BLOOD PRESSURE: 73 MMHG | OXYGEN SATURATION: 97 % | HEIGHT: 65 IN

## 2023-05-03 DIAGNOSIS — K31.89 GASTRIC NODULE: ICD-10-CM

## 2023-05-03 RX ORDER — SODIUM CHLORIDE, SODIUM LACTATE, POTASSIUM CHLORIDE, CALCIUM CHLORIDE 600; 310; 30; 20 MG/100ML; MG/100ML; MG/100ML; MG/100ML
INJECTION, SOLUTION INTRAVENOUS CONTINUOUS PRN
Status: DISCONTINUED | OUTPATIENT
Start: 2023-05-03 | End: 2023-05-03

## 2023-05-03 RX ORDER — PROPOFOL 10 MG/ML
INJECTION, EMULSION INTRAVENOUS AS NEEDED
Status: DISCONTINUED | OUTPATIENT
Start: 2023-05-03 | End: 2023-05-03

## 2023-05-03 RX ORDER — LIDOCAINE HYDROCHLORIDE 20 MG/ML
INJECTION, SOLUTION EPIDURAL; INFILTRATION; INTRACAUDAL; PERINEURAL AS NEEDED
Status: DISCONTINUED | OUTPATIENT
Start: 2023-05-03 | End: 2023-05-03

## 2023-05-03 RX ADMIN — LIDOCAINE HYDROCHLORIDE 100 MG: 20 INJECTION, SOLUTION EPIDURAL; INFILTRATION; INTRACAUDAL; PERINEURAL at 08:03

## 2023-05-03 RX ADMIN — PROPOFOL 20 MG: 10 INJECTION, EMULSION INTRAVENOUS at 08:09

## 2023-05-03 RX ADMIN — PROPOFOL 130 MG: 10 INJECTION, EMULSION INTRAVENOUS at 08:03

## 2023-05-03 RX ADMIN — PROPOFOL 20 MG: 10 INJECTION, EMULSION INTRAVENOUS at 08:04

## 2023-05-03 RX ADMIN — PROPOFOL 20 MG: 10 INJECTION, EMULSION INTRAVENOUS at 08:06

## 2023-05-03 RX ADMIN — SODIUM CHLORIDE, SODIUM LACTATE, POTASSIUM CHLORIDE, AND CALCIUM CHLORIDE: .6; .31; .03; .02 INJECTION, SOLUTION INTRAVENOUS at 07:55

## 2023-05-03 NOTE — ANESTHESIA PREPROCEDURE EVALUATION
Procedure:  EGD    Relevant Problems   ANESTHESIA (within normal limits)      CARDIO   (-) HTN (hypertension)   (-) MI (myocardial infarction) (HCC)      ENDO   (+) Hypothyroidism      GI/HEPATIC   (+) GERD (gastroesophageal reflux disease)      /RENAL   (+) Hydronephrosis of right kidney      GYN (within normal limits)      HEMATOLOGY   (+) Small B-cell lymphoma (HCC)   (+) Thrombocytopenia (HCC)      MUSCULOSKELETAL   (+) Sacroiliitis, not elsewhere classified (HCC)      NEURO/PSYCH   (+) Anxiety   (+) Bipolar affective disorder, currently depressed, mild (HCC)   (+) Current moderate episode of major depressive disorder without prior episode (HCC)   (+) History of pulmonary embolism      PULMONARY   (-) Asthma   (-) Sleep apnea             Anesthesia Plan  ASA Score- 2     Anesthesia Type- IV sedation with anesthesia with ASA Monitors  Additional Monitors:   Airway Plan:           Plan Factors-Exercise tolerance (METS): >4 METS  Chart reviewed  EKG reviewed  Existing labs reviewed  Patient summary reviewed  Patient is not a current smoker  Induction- intravenous  Postoperative Plan-     Informed Consent- Anesthetic plan and risks discussed with patient and spouse  I personally reviewed this patient with the CRNA  Discussed and agreed on the Anesthesia Plan with the CRNA  Chantell Quigley

## 2023-05-03 NOTE — H&P
History and Physical - [facility]  Smita Clemens 67 y o  female MRN: [id number]                  HPI: Smita Clemens is a 67y o  year old female who presents for upper endoscopy to follow-up on antral nodule and polyp in cardia of stomach      REVIEW OF SYSTEMS: Per the HPI, and otherwise unremarkable      Historical Information   Past Medical History:   Diagnosis Date   • Anxiety    • Disease of thyroid gland    • GERD (gastroesophageal reflux disease)    • H/O bilateral hip replacements    • Non Hodgkin's lymphoma ([address] )      Past Surgical History:   Procedure Laterality Date   • BACK SURGERY      lumbar spine   • FL RETROGRADE PYELOGRAM  4/11/2023   • HIP SURGERY Bilateral    • JOINT REPLACEMENT     • KNEE SURGERY Right    • [place] CYSTO/URETERO W/LITHOTRIPSY &INDWELL STENT INSRT Right 4/11/2023    Procedure: CYSTOSCOPY URETEROSCOPY WITH RETROGRADE PYELOGRAM AND INSERTION STENT URETERAL, STONE BASKET EXTRACTION;  Surgeon: Gonzalo Rg MD;  Location: [facility];  Service: Urology   • TOTAL SHOULDER REPLACEMENT Right      Social History   Social History     Substance and Sexual Activity   Alcohol Use Not Currently    Comment: wine     Social History     Substance and Sexual Activity   Drug Use Never     Social History     Tobacco Use   Smoking Status Never   Smokeless Tobacco Never     Family History   Problem Relation Age of Onset   • Lymphoma Mother    • No Known Problems Father    • No Known Problems Daughter    • No Known Problems Daughter    • No Known Problems Maternal Grandmother    • No Known Problems Maternal Grandfather    • No Known Problems Paternal Grandmother    • No Known Problems Paternal Grandfather    • No Known Problems Brother        Meds/Allergies       Current Outpatient Medications:   •  cyclobenzaprine (FLEXERIL) 10 mg tablet  •  divalproex sodium (Depakote ER) 500 mg 24 hr tablet  •  famotidine (PEPCID) 20 mg tablet  •  levothyroxine 75 mcg tablet  •  multivitamin "(THERAGRAN) TABS  •  pantoprazole (PROTONIX) 40 mg tablet  •  traZODone (DESYREL) 50 mg tablet  •  venlafaxine (EFFEXOR-XR) 150 mg 24 hr capsule  •  amoxicillin (AMOXIL) 500 mg capsule  •  diclofenac potassium (CATAFLAM) 50 mg tablet  •  tamsulosin (FLOMAX) 0 4 mg    Allergies   Allergen Reactions   • Clindamycin Hives       Objective     /93   Pulse 85   Temp 97 5 °F (36 4 °C) (Tympanic)   Resp (!) 10   Ht 5' 5\" (1 651 m)   Wt 88 5 kg (195 lb)   SpO2 99%   BMI 32 45 kg/m²       PHYSICAL EXAM    Gen: NAD  Head: NCAT  CV: RRR  CHEST: Clear  ABD: soft, NT/ND  EXT: no edema      ASSESSMENT/PLAN:  This is a 67y o  year old female here for EGD, and she is stable and optimized for her procedure        "

## 2023-05-03 NOTE — ANESTHESIA POSTPROCEDURE EVALUATION
Post-Op Assessment Note    CV Status:  Stable  Pain Score: 0    Pain management: adequate     Mental Status:  Awake and alert   Hydration Status:  Stable   PONV Controlled:  None   Airway Patency:  Patent      Post Op Vitals Reviewed: Yes      Staff: CRNA         No notable events documented      BP  120/59    Temp 97 5   Pulse 75   Resp 17   SpO2 95

## 2023-05-05 ENCOUNTER — VBI (OUTPATIENT)
Dept: ADMINISTRATIVE | Facility: OTHER | Age: 73
End: 2023-05-05

## 2023-05-11 ENCOUNTER — HOSPITAL ENCOUNTER (OUTPATIENT)
Dept: RADIOLOGY | Facility: MEDICAL CENTER | Age: 73
Discharge: HOME | End: 2023-05-11

## 2023-05-11 DIAGNOSIS — N20.0 NEPHROLITHIASIS: ICD-10-CM

## 2023-05-17 ENCOUNTER — HOSPITAL ENCOUNTER (OUTPATIENT)
Dept: RADIOLOGY | Facility: MEDICAL CENTER | Age: 73
Discharge: HOME | End: 2023-05-17

## 2023-05-17 VITALS — WEIGHT: 195 LBS | BODY MASS INDEX: 32.49 KG/M2 | HEIGHT: 65 IN

## 2023-05-17 DIAGNOSIS — Z12.31 ENCOUNTER FOR SCREENING MAMMOGRAM FOR MALIGNANT NEOPLASM OF BREAST: ICD-10-CM

## 2023-05-17 DIAGNOSIS — Z12.31 ENCOUNTER FOR SCREENING MAMMOGRAM FOR BREAST CANCER: ICD-10-CM

## 2023-05-24 ENCOUNTER — OFFICE VISIT (OUTPATIENT)
Dept: UROLOGY | Facility: CLINIC | Age: 73
End: 2023-05-24

## 2023-05-24 VITALS
OXYGEN SATURATION: 97 % | WEIGHT: 200 LBS | HEIGHT: 65 IN | SYSTOLIC BLOOD PRESSURE: 120 MMHG | BODY MASS INDEX: 33.32 KG/M2 | HEART RATE: 66 BPM | DIASTOLIC BLOOD PRESSURE: 80 MMHG

## 2023-05-24 DIAGNOSIS — N28.1 KIDNEY CYST, ACQUIRED: Primary | ICD-10-CM

## 2023-05-24 NOTE — PROGRESS NOTES
UROLOGY PROGRESS NOTE   Patient Identifiers: Concepcion Sultana (MRN [id number])  Date of Service: 5/24/2023    Subjective:   65-year-old female history of kidney stones  She had a right ureteral stone lasered and removed April 11  Stent was self removed  Follow-up ultrasound showing no hydronephrosis or calculi seen bilaterally      Reason for visit: Kidney stone follow-up    Objective:     VITALS:    Vitals:    05/24/23 0733   BP: 120/80   Pulse: 66   SpO2: 97%           LABS:  Lab Results   Component Value Date    HCT 45 7 04/11/2023    HGB 14 6 04/11/2023     (L) 04/11/2023    WBC 5 59 04/11/2023   ]    Lab Results   Component Value Date    BUN 20 04/11/2023    CALCIUM 9 4 04/11/2023     04/11/2023    CO2 32 04/11/2023    CREATININE 0 76 04/11/2023    K 4 6 04/11/2023   ]        INPATIENT MEDS:    Current Outpatient Medications:   •  amoxicillin (AMOXIL) 500 mg capsule, , Disp: , Rfl:   •  cyclobenzaprine (FLEXERIL) 10 mg tablet, Take 1 tablet (10 mg total) by mouth 3 (three) times a day as needed for muscle spasms, Disp: 30 tablet, Rfl: 0  •  divalproex sodium (Depakote ER) 500 mg 24 hr tablet, Take 1 tablet (500 mg total) by mouth daily, Disp: 90 tablet, Rfl: 1  •  famotidine (PEPCID) 20 mg tablet, Take 1 tablet (20 mg total) by mouth 2 (two) times a day, Disp: 180 tablet, Rfl: 1  •  levothyroxine 75 mcg tablet, Take 1 tablet (75 mcg total) by mouth daily in the early morning, Disp: 90 tablet, Rfl: 1  •  multivitamin (THERAGRAN) TABS, Take 1 tablet by mouth daily, Disp: , Rfl:   •  pantoprazole (PROTONIX) 40 mg tablet, Take 1 tablet (40 mg total) by mouth daily before breakfast, Disp: 90 tablet, Rfl: 1  •  tamsulosin (FLOMAX) 0 4 mg, Take 1 capsule (0 4 mg total) by mouth daily with dinner, Disp: 30 capsule, Rfl: 0  •  traZODone (DESYREL) 50 mg tablet, Take 1 tablet (50 mg total) by mouth daily at bedtime as needed for sleep, Disp: 90 tablet, Rfl: 1  •  venlafaxine (EFFEXOR-XR) 150 mg 24 hr "capsule, Take 1 capsule (150 mg total) by mouth daily, Disp: 90 capsule, Rfl: 0  •  diclofenac potassium (CATAFLAM) 50 mg tablet, Take 1 tablet (50 mg total) by mouth 2 (two) times a day for 5 days, Disp: 10 tablet, Rfl: 0      Physical Exam:   /80 (BP Location: Left arm, Patient Position: Sitting, Cuff Size: Standard)   Pulse 66   Ht 5' 5\" (1 651 m)   Wt 90 7 kg (200 lb)   SpO2 97%   BMI 33 28 kg/m²   GEN: no acute distress    RESP: breathing comfortably with no accessory muscle use    ABD: soft, non-tender, non-distended   INCISION:    EXT: no significant peripheral edema       RADIOLOGY:    RENAL ULTRASOUND WITH PVR   IMPRESSION:     1  No hydronephrosis or sonographic evidence of nephrolithiasis  2  Under distention of the urinary bladder on prevoid imaging however ureteral jets are detected  3  Septated cyst in the midpole the right kidney measuring 2 1 cm  Consider surveillance imaging in 6 months  Assessment:   #1  Nephrolithiasis  #2    Right kidney cyst    Plan:   -Follow-up in 6 months with ultrasound prior to visit as recommended by radiologist  -  -  -          "

## 2023-06-04 ENCOUNTER — OFFICE VISIT (OUTPATIENT)
Dept: URGENT CARE | Facility: MEDICAL CENTER | Age: 73
End: 2023-06-04
Payer: COMMERCIAL

## 2023-06-04 VITALS
BODY MASS INDEX: 32.65 KG/M2 | OXYGEN SATURATION: 99 % | HEART RATE: 73 BPM | HEIGHT: 65 IN | WEIGHT: 196 LBS | RESPIRATION RATE: 18 BRPM | SYSTOLIC BLOOD PRESSURE: 169 MMHG | DIASTOLIC BLOOD PRESSURE: 79 MMHG | TEMPERATURE: 97.6 F

## 2023-06-04 DIAGNOSIS — R05.1 ACUTE COUGH: ICD-10-CM

## 2023-06-04 DIAGNOSIS — R49.0 DYSPHONIA: ICD-10-CM

## 2023-06-04 DIAGNOSIS — R09.82 POSTNASAL DRIP: Primary | ICD-10-CM

## 2023-06-04 PROCEDURE — 99213 OFFICE O/P EST LOW 20 MIN: CPT | Performed by: PHYSICIAN ASSISTANT

## 2023-06-04 PROCEDURE — S9088 SERVICES PROVIDED IN URGENT: HCPCS | Performed by: PHYSICIAN ASSISTANT

## 2023-06-04 RX ORDER — BENZONATATE 200 MG/1
200 CAPSULE ORAL 3 TIMES DAILY PRN
Qty: 20 CAPSULE | Refills: 0 | Status: SHIPPED | OUTPATIENT
Start: 2023-06-04

## 2023-06-04 NOTE — PATIENT INSTRUCTIONS
1   Over-the-counter cetirizine 10 mg daily  2   Over-the-counter Flonase nasal spray as directed on the package  3   Increase oral fluids  4   Follow-up here or see your primary care physician in 5 to 7 days for any persistent symptoms  5   Over-the-counter guaifenesin as needed for mucus relief  6  Go to the ER immediately for any significantly worsening symptoms

## 2023-06-04 NOTE — PROGRESS NOTES
[facility]        NAME: Quang Webster is a 67 y o  female  : 1950    MRN: [id number]  DATE: 2023  TIME: 9:33 AM    Assessment and Plan   Postnasal drip [R09 82]  1  Postnasal drip        2  Acute cough  benzonatate (TESSALON) 200 MG capsule      3  Dysphonia              Patient Instructions     1  Over-the-counter cetirizine 10 mg daily  2   Over-the-counter Flonase nasal spray as directed on the package  3   Increase oral fluids  4   Follow-up here or see your primary care physician in 5 to 7 days for any persistent symptoms  5   Over-the-counter guaifenesin as needed for mucus relief  6  Go to the ER immediately for any significantly worsening symptoms  Chief Complaint     Chief Complaint   Patient presents with   • Cough     Pt reports having lost her voice last week wed-fri, and having a productive cough with green mucus x3 days  History of Present Illness       76-year-old female patient who states she started with hoarseness approximately 3 days ago which eventually resolved  However, those symptoms were followed by bothersome productive cough, feeling of postnasal drip, and fatigue  Patient denies any significant anterior nasal congestion or rhinorrhea  No shortness of breath or chest heaviness  No GI symptoms  No facial pain or pressure  Cough is worse at night  Review of Systems   Review of Systems   Constitutional: Positive for fatigue  Negative for chills and fever  HENT: Positive for postnasal drip and voice change  Negative for congestion, ear pain, rhinorrhea and sore throat  Eyes: Negative for pain and visual disturbance  Respiratory: Positive for cough  Negative for shortness of breath  Cardiovascular: Negative for chest pain and palpitations  Gastrointestinal: Negative for abdominal pain and vomiting  Genitourinary: Negative for dysuria and hematuria  Musculoskeletal: Negative for arthralgias and back pain     Skin: Negative for color change and rash  Neurological: Negative for seizures and syncope  All other systems reviewed and are negative          Current Medications       Current Outpatient Medications:   •  benzonatate (TESSALON) 200 MG capsule, Take 1 capsule (200 mg total) by mouth 3 (three) times a day as needed for cough, Disp: 20 capsule, Rfl: 0  •  cyclobenzaprine (FLEXERIL) 10 mg tablet, Take 1 tablet (10 mg total) by mouth 3 (three) times a day as needed for muscle spasms, Disp: 30 tablet, Rfl: 0  •  divalproex sodium (Depakote ER) 500 mg 24 hr tablet, Take 1 tablet (500 mg total) by mouth daily, Disp: 90 tablet, Rfl: 1  •  famotidine (PEPCID) 20 mg tablet, Take 1 tablet (20 mg total) by mouth 2 (two) times a day, Disp: 180 tablet, Rfl: 1  •  levothyroxine 75 mcg tablet, Take 1 tablet (75 mcg total) by mouth daily in the early morning, Disp: 90 tablet, Rfl: 1  •  multivitamin (THERAGRAN) TABS, Take 1 tablet by mouth daily, Disp: , Rfl:   •  pantoprazole (PROTONIX) 40 mg tablet, Take 1 tablet (40 mg total) by mouth daily before breakfast, Disp: 90 tablet, Rfl: 1  •  tamsulosin (FLOMAX) 0 4 mg, Take 1 capsule (0 4 mg total) by mouth daily with dinner, Disp: 30 capsule, Rfl: 0  •  traZODone (DESYREL) 50 mg tablet, Take 1 tablet (50 mg total) by mouth daily at bedtime as needed for sleep, Disp: 90 tablet, Rfl: 1  •  venlafaxine (EFFEXOR-XR) 150 mg 24 hr capsule, Take 1 capsule (150 mg total) by mouth daily, Disp: 90 capsule, Rfl: 0  •  amoxicillin (AMOXIL) 500 mg capsule, , Disp: , Rfl:   •  diclofenac potassium (CATAFLAM) 50 mg tablet, Take 1 tablet (50 mg total) by mouth 2 (two) times a day for 5 days, Disp: 10 tablet, Rfl: 0    Current Allergies     Allergies as of 06/04/2023 - Reviewed 06/04/2023   Allergen Reaction Noted   • Clindamycin Hives 09/21/2010            The following portions of the patient's history were reviewed and updated as appropriate: allergies, current medications, past family history, past medical "history, past social history, past surgical history and problem list      Past Medical History:   Diagnosis Date   • Anxiety    • Disease of thyroid gland    • GERD (gastroesophageal reflux disease)    • H/O bilateral hip replacements    • Non Hodgkin's lymphoma ([facility] [address] )        Past Surgical History:   Procedure Laterality Date   • BACK SURGERY      lumbar spine   • [place] RETROGRADE PYELOGRAM  4/11/2023   • HIP SURGERY Bilateral    • JOINT REPLACEMENT     • KNEE SURGERY Right    • [place] CYSTO/URETERO W/LITHOTRIPSY &INDWELL STENT INSRT Right 4/11/2023    Procedure: CYSTOSCOPY URETEROSCOPY WITH RETROGRADE PYELOGRAM AND INSERTION STENT URETERAL, STONE BASKET EXTRACTION;  Surgeon: Fermin Asher MD;  Location: [facility];  Service: Urology   • TOTAL SHOULDER REPLACEMENT Right        Family History   Problem Relation Age of Onset   • Lymphoma Mother    • No Known Problems Father    • No Known Problems Daughter    • No Known Problems Daughter    • No Known Problems Maternal Grandmother    • No Known Problems Maternal Grandfather    • No Known Problems Paternal Grandmother    • No Known Problems Paternal Grandfather    • No Known Problems Brother          Medications have been verified  Objective   /79   Pulse 73   Temp 97 6 °F (36 4 °C) (Temporal)   Resp 18   Ht 5' 5\" (1 651 m)   Wt 88 9 kg (196 lb)   SpO2 99%   BMI 32 62 kg/m²        Physical Exam     Physical Exam  Vitals and nursing note reviewed  Constitutional:       General: She is not in acute distress  Appearance: Normal appearance  She is not ill-appearing or toxic-appearing  HENT:      Head: Normocephalic  Right Ear: Tympanic membrane normal       Left Ear: Tympanic membrane normal       Nose: No congestion  Mouth/Throat:      Mouth: Mucous membranes are moist       Pharynx: Oropharynx is clear  No posterior oropharyngeal erythema  Comments: Scant clear postnasal discharge noted    Eyes:      Conjunctiva/sclera: Conjunctivae " normal       Pupils: Pupils are equal, round, and reactive to light  Cardiovascular:      Rate and Rhythm: Normal rate and regular rhythm  Pulses: Normal pulses  Heart sounds: Normal heart sounds  Pulmonary:      Effort: Pulmonary effort is normal       Breath sounds: Normal breath sounds  Abdominal:      Tenderness: There is no abdominal tenderness  Musculoskeletal:         General: Normal range of motion  Cervical back: Normal range of motion  Skin:     General: Skin is warm and dry  Capillary Refill: Capillary refill takes less than 2 seconds  Neurological:      Mental Status: She is alert and oriented to person, place, and time     Psychiatric:         Mood and Affect: Mood normal          Behavior: Behavior normal

## 2023-06-09 ENCOUNTER — OFFICE VISIT (OUTPATIENT)
Dept: FAMILY MEDICINE CLINIC | Facility: CLINIC | Age: 73
End: 2023-06-09
Payer: COMMERCIAL

## 2023-06-09 VITALS
TEMPERATURE: 97.7 F | WEIGHT: 200 LBS | SYSTOLIC BLOOD PRESSURE: 112 MMHG | HEIGHT: 65 IN | HEART RATE: 75 BPM | BODY MASS INDEX: 33.32 KG/M2 | OXYGEN SATURATION: 98 % | DIASTOLIC BLOOD PRESSURE: 74 MMHG

## 2023-06-09 DIAGNOSIS — R05.1 ACUTE COUGH: Primary | ICD-10-CM

## 2023-06-09 DIAGNOSIS — J01.10 ACUTE NON-RECURRENT FRONTAL SINUSITIS: ICD-10-CM

## 2023-06-09 PROCEDURE — 99213 OFFICE O/P EST LOW 20 MIN: CPT | Performed by: FAMILY MEDICINE

## 2023-06-09 RX ORDER — GUAIFENESIN AND CODEINE PHOSPHATE 100; 10 MG/5ML; MG/5ML
5 SOLUTION ORAL 4 TIMES DAILY PRN
Qty: 180 ML | Refills: 0 | Status: SHIPPED | OUTPATIENT
Start: 2023-06-09

## 2023-06-09 RX ORDER — AMOXICILLIN 500 MG/1
500 CAPSULE ORAL EVERY 8 HOURS SCHEDULED
Qty: 30 CAPSULE | Refills: 0 | Status: SHIPPED | OUTPATIENT
Start: 2023-06-09 | End: 2023-06-19

## 2023-06-09 NOTE — PROGRESS NOTES
Name: Tiki Vieyra      : 1950      MRN: [id number]  Encounter Provider: Amy Prater MD  Encounter Date: 2023   Encounter department: [address] [place] [address] Place     1  Acute cough  -     guaifenesin-codeine (GUAIFENESIN AC) 100-10 MG/5ML liquid; Take 5 mL by mouth 4 (four) times a day as needed for cough    2  Acute non-recurrent frontal sinusitis  -     amoxicillin (AMOXIL) 500 mg capsule; Take 1 capsule (500 mg total) by mouth every 8 (eight) hours for 10 days           Subjective     Is a 69-year-old female today for checkup on up respiratory infection symptoms that she has been having patient was recently seen at walk-in center and started on some Tessalon Perles Flonase and Zyrtec  Patient had no fever but she does have a frontal headache she does have a lot of nasal congestion with some green discharge as well as some cough she has no ear pain  She has no nausea vomiting or diarrhea she does have some frontal head pressure when she leans forward  She has no upper tooth discomfort and no ear discomfort  Patient with classic frontal sinusitis and will treat her as appropriately      Review of Systems    Past Medical History:   Diagnosis Date   • Anxiety    • Disease of thyroid gland    • GERD (gastroesophageal reflux disease)    • H/O bilateral hip replacements    • Non Hodgkin's lymphoma ([address] )      Past Surgical History:   Procedure Laterality Date   • BACK SURGERY      lumbar spine   • FL RETROGRADE PYELOGRAM  2023   • HIP SURGERY Bilateral    • JOINT REPLACEMENT     • KNEE SURGERY Right    • OR CYSTO/URETERO W/LITHOTRIPSY &INDWELL STENT INSRT Right 2023    Procedure: CYSTOSCOPY URETEROSCOPY WITH RETROGRADE PYELOGRAM AND INSERTION STENT URETERAL, STONE BASKET EXTRACTION;  Surgeon: Fermin Asher MD;  Location: [facility];  Service: Urology   • TOTAL SHOULDER REPLACEMENT Right      Family History   Problem Relation Age of Onset   • Lymphoma Mother    • No Known Problems Father    • No Known Problems Daughter    • No Known Problems Daughter    • No Known Problems Maternal Grandmother    • No Known Problems Maternal Grandfather    • No Known Problems Paternal Grandmother    • No Known Problems Paternal Grandfather    • No Known Problems Brother      Social History     Socioeconomic History   • Marital status: /Civil Union     Spouse name: None   • Number of children: None   • Years of education: None   • Highest education level: None   Occupational History   • None   Tobacco Use   • Smoking status: Never   • Smokeless tobacco: Never   Vaping Use   • Vaping Use: Never used   Substance and Sexual Activity   • Alcohol use: Not Currently     Comment: wine   • Drug use: Never   • Sexual activity: Not Currently   Other Topics Concern   • None   Social History Narrative   • None     Social Determinants of Health     Financial Resource Strain: Not on file   Food Insecurity: Not on file   Transportation Needs: Not on file   Physical Activity: Not on file   Stress: Not on file   Social Connections: Not on file   Intimate Partner Violence: Not on file   Housing Stability: Not on file     Current Outpatient Medications on File Prior to Visit   Medication Sig   • benzonatate (TESSALON) 200 MG capsule Take 1 capsule (200 mg total) by mouth 3 (three) times a day as needed for cough   • cyclobenzaprine (FLEXERIL) 10 mg tablet Take 1 tablet (10 mg total) by mouth 3 (three) times a day as needed for muscle spasms   • diclofenac potassium (CATAFLAM) 50 mg tablet Take 1 tablet (50 mg total) by mouth 2 (two) times a day for 5 days   • divalproex sodium (Depakote ER) 500 mg 24 hr tablet Take 1 tablet (500 mg total) by mouth daily   • famotidine (PEPCID) 20 mg tablet Take 1 tablet (20 mg total) by mouth 2 (two) times a day   • levothyroxine 75 mcg tablet Take 1 tablet (75 mcg total) by mouth daily in the early morning   • multivitamin (THERAGRAN) TABS Take 1 tablet by "mouth daily   • pantoprazole (PROTONIX) 40 mg tablet Take 1 tablet (40 mg total) by mouth daily before breakfast   • tamsulosin (FLOMAX) 0 4 mg Take 1 capsule (0 4 mg total) by mouth daily with dinner   • traZODone (DESYREL) 50 mg tablet Take 1 tablet (50 mg total) by mouth daily at bedtime as needed for sleep   • venlafaxine (EFFEXOR-XR) 150 mg 24 hr capsule Take 1 capsule (150 mg total) by mouth daily   • amoxicillin (AMOXIL) 500 mg capsule  (Patient not taking: Reported on 6/4/2023)     Allergies   Allergen Reactions   • Clindamycin Hives     Immunization History   Administered Date(s) Administered   • COVID-19 MODERNA VACC 0 5 ML IM 01/28/2021, 03/05/2021, 11/10/2021   • H1N1 Inj 02/16/2010   • INFLUENZA 10/20/2022   • Influenza Quadrivalent Preservative Free 3 years and older IM 11/18/2015, 10/12/2016, 10/13/2017, 11/08/2018   • Influenza, high dose seasonal 0 7 mL 10/24/2021, 10/20/2022   • Influenza, seasonal, injectable 10/24/2006, 10/26/2007, 11/12/2008, 10/15/2009, 10/12/2010, 11/22/2011, 12/13/2012, 11/14/2013, 10/22/2014   • Pneumococcal Conjugate 13-Valent 11/18/2015   • Pneumococcal Polysaccharide PPV23 01/01/2004, 11/18/2014   • TD (adult) Preservative Free 12/07/2017   • Tdap 07/27/2007, 10/21/2019   • Zoster Vaccine Recombinant 12/04/2018, 10/28/2019   • influenza, trivalent, adjuvanted 10/21/2019       Objective     /74 (BP Location: Left arm, Patient Position: Sitting, Cuff Size: Standard)   Pulse 75   Temp 97 7 °F (36 5 °C) (Skin)   Ht 5' 5\" (1 651 m)   Wt 90 7 kg (200 lb)   SpO2 98%   BMI 33 28 kg/m²     Physical Exam  Magda Dominguez MD  "

## 2023-06-30 DIAGNOSIS — F32.1 CURRENT MODERATE EPISODE OF MAJOR DEPRESSIVE DISORDER WITHOUT PRIOR EPISODE (HCC): ICD-10-CM

## 2023-06-30 RX ORDER — VENLAFAXINE HYDROCHLORIDE 150 MG/1
CAPSULE, EXTENDED RELEASE ORAL
Qty: 90 CAPSULE | Refills: 0 | Status: SHIPPED | OUTPATIENT
Start: 2023-06-30

## 2023-07-06 DIAGNOSIS — E03.9 HYPOTHYROIDISM, UNSPECIFIED TYPE: ICD-10-CM

## 2023-07-06 RX ORDER — LEVOTHYROXINE SODIUM 0.07 MG/1
75 TABLET ORAL
Qty: 90 TABLET | Refills: 1 | Status: SHIPPED | OUTPATIENT
Start: 2023-07-06

## 2023-07-31 ENCOUNTER — APPOINTMENT (OUTPATIENT)
Dept: LAB | Facility: MEDICAL CENTER | Age: 73
End: 2023-07-31
Payer: COMMERCIAL

## 2023-07-31 DIAGNOSIS — E03.4 HYPOTHYROIDISM DUE TO ACQUIRED ATROPHY OF THYROID: ICD-10-CM

## 2023-07-31 LAB — TSH SERPL DL<=0.05 MIU/L-ACNC: 1.05 UIU/ML (ref 0.45–4.5)

## 2023-07-31 PROCEDURE — 36415 COLL VENOUS BLD VENIPUNCTURE: CPT

## 2023-07-31 PROCEDURE — 84443 ASSAY THYROID STIM HORMONE: CPT

## 2023-08-09 ENCOUNTER — RA CDI HCC (OUTPATIENT)
Dept: OTHER | Facility: HOSPITAL | Age: 73
End: 2023-08-09

## 2023-08-09 ENCOUNTER — OFFICE VISIT (OUTPATIENT)
Dept: FAMILY MEDICINE CLINIC | Facility: CLINIC | Age: 73
End: 2023-08-09
Payer: COMMERCIAL

## 2023-08-09 VITALS
WEIGHT: 205.4 LBS | BODY MASS INDEX: 34.22 KG/M2 | OXYGEN SATURATION: 97 % | SYSTOLIC BLOOD PRESSURE: 124 MMHG | DIASTOLIC BLOOD PRESSURE: 74 MMHG | HEART RATE: 68 BPM | RESPIRATION RATE: 18 BRPM | HEIGHT: 65 IN | TEMPERATURE: 98.1 F

## 2023-08-09 DIAGNOSIS — F31.31 BIPOLAR AFFECTIVE DISORDER, CURRENTLY DEPRESSED, MILD (HCC): ICD-10-CM

## 2023-08-09 DIAGNOSIS — E03.4 HYPOTHYROIDISM DUE TO ACQUIRED ATROPHY OF THYROID: ICD-10-CM

## 2023-08-09 DIAGNOSIS — R53.83 OTHER FATIGUE: ICD-10-CM

## 2023-08-09 DIAGNOSIS — C83.00 SMALL B-CELL LYMPHOMA, UNSPECIFIED BODY REGION (HCC): ICD-10-CM

## 2023-08-09 DIAGNOSIS — K21.9 GASTROESOPHAGEAL REFLUX DISEASE, UNSPECIFIED WHETHER ESOPHAGITIS PRESENT: ICD-10-CM

## 2023-08-09 DIAGNOSIS — E66.9 CLASS 2 OBESITY WITHOUT SERIOUS COMORBIDITY IN ADULT, UNSPECIFIED BMI, UNSPECIFIED OBESITY TYPE: ICD-10-CM

## 2023-08-09 DIAGNOSIS — Z13.220 SCREENING CHOLESTEROL LEVEL: ICD-10-CM

## 2023-08-09 DIAGNOSIS — Z86.711 HISTORY OF PULMONARY EMBOLISM: ICD-10-CM

## 2023-08-09 DIAGNOSIS — Z00.00 WELL ADULT EXAM: Primary | ICD-10-CM

## 2023-08-09 DIAGNOSIS — F32.1 CURRENT MODERATE EPISODE OF MAJOR DEPRESSIVE DISORDER WITHOUT PRIOR EPISODE (HCC): ICD-10-CM

## 2023-08-09 PROCEDURE — 99214 OFFICE O/P EST MOD 30 MIN: CPT | Performed by: FAMILY MEDICINE

## 2023-08-09 NOTE — PROGRESS NOTES
[facility] [address] [facility] coding opportunities       Chart reviewed, no opportunity found: [address] [facility]        Patients Insurance     Medicare Insurance: [employer]

## 2023-08-10 ENCOUNTER — OFFICE VISIT (OUTPATIENT)
Dept: OBGYN CLINIC | Facility: CLINIC | Age: 73
End: 2023-08-10
Payer: COMMERCIAL

## 2023-08-10 VITALS
HEART RATE: 64 BPM | HEIGHT: 65 IN | DIASTOLIC BLOOD PRESSURE: 85 MMHG | WEIGHT: 205 LBS | BODY MASS INDEX: 34.16 KG/M2 | SYSTOLIC BLOOD PRESSURE: 146 MMHG

## 2023-08-10 DIAGNOSIS — M65.342 TRIGGER FINGER, LEFT RING FINGER: Primary | ICD-10-CM

## 2023-08-10 DIAGNOSIS — F31.31 BIPOLAR AFFECTIVE DISORDER, CURRENTLY DEPRESSED, MILD (HCC): ICD-10-CM

## 2023-08-10 PROCEDURE — 99203 OFFICE O/P NEW LOW 30 MIN: CPT | Performed by: PHYSICIAN ASSISTANT

## 2023-08-10 PROCEDURE — 20550 NJX 1 TENDON SHEATH/LIGAMENT: CPT | Performed by: PHYSICIAN ASSISTANT

## 2023-08-10 RX ORDER — DIVALPROEX SODIUM 500 MG/1
500 TABLET, EXTENDED RELEASE ORAL DAILY
Qty: 90 TABLET | Refills: 1 | Status: SHIPPED | OUTPATIENT
Start: 2023-08-10

## 2023-08-10 RX ORDER — LIDOCAINE HYDROCHLORIDE 10 MG/ML
0.5 INJECTION, SOLUTION INFILTRATION; PERINEURAL
Status: COMPLETED | OUTPATIENT
Start: 2023-08-10 | End: 2023-08-10

## 2023-08-10 RX ORDER — TRIAMCINOLONE ACETONIDE 40 MG/ML
20 INJECTION, SUSPENSION INTRA-ARTICULAR; INTRAMUSCULAR
Status: COMPLETED | OUTPATIENT
Start: 2023-08-10 | End: 2023-08-10

## 2023-08-10 RX ADMIN — TRIAMCINOLONE ACETONIDE 20 MG: 40 INJECTION, SUSPENSION INTRA-ARTICULAR; INTRAMUSCULAR at 10:00

## 2023-08-10 RX ADMIN — LIDOCAINE HYDROCHLORIDE 0.5 ML: 10 INJECTION, SOLUTION INFILTRATION; PERINEURAL at 10:00

## 2023-08-10 NOTE — PROGRESS NOTES
Orthopaedic Surgery - Office Note  Peri Martinez (17 y.o. female)   : 1950   MRN: [id number]  Encounter Date: 8/10/2023    Chief Complaint   Patient presents with   • Left Hand - Pain         Assessment/Plan  Diagnoses and all orders for this visit:    Trigger finger, left ring finger    Other orders  -     Hand/upper extremity injection    The trigger finger diagnosis as well as treatment options were reviewed with the patient in the office today. Patient was advised the best initial treatment would be to consider a cortisone injection. If the 1st cortisone injection failed to relieve the symptoms, a 2nd injection could be considered in the future. If symptoms persist beyond 2 attempted injections, recommendations for follow-up with the hand surgeon to discuss surgical release were reviewed. All question and concerns were answered in the office today. Return if symptoms worsen or fail to improve-with myself for second injection if needed. History of Present Illness  This is a new patient with left ring finger pain. Patient reports the pain and locking at the base of the finger. She has not had any successful treatment. Patient reports she has had symptoms for about 4 months. No known trauma is noted. She is right-hand dominant. She reports she took her rings off last night which was very difficult. No paresthesias are reported. She states she has had trigger fingers in the past on the right side and she does not recall if she ever needed surgery. She denies being diabetic. Review of Systems  Pertinent items are noted in HPI. All other systems were reviewed and are negative. Physical Exam  /85 (BP Location: Right arm, Patient Position: Sitting, Cuff Size: Standard) Comment: nervous  Pulse 64   Ht 5' 5" (1.651 m)   Wt 93 kg (205 lb)   BMI 34.11 kg/m²   Cons: Appears well. No apparent distress. Psych: Alert. Oriented x3.   Mood and affect normal.    Left ring finger is without skin breakdown lesion or signs of infection. She is tender to palpation at the A1 pulley where there is a palpable nodule and active triggering today in the office. She has full range of motion of the MCP and IP joints of the left ring finger. She is neurovascular intact with normal capillary refill. There are no Dupuytren's contractures. Studies Reviewed  X-rays not indicated at this time    Hand/upper extremity injection: L ring A1  Universal Protocol:  Consent: Verbal consent obtained. Risks and benefits: risks, benefits and alternatives were discussed  Time out: Immediately prior to procedure a "time out" was called to verify the correct patient, procedure, equipment, support staff and site/side marked as required. Patient understanding: patient states understanding of the procedure being performed  Patient consent: the patient's understanding of the procedure matches consent given  Relevant documents: relevant documents present and verified  Test results: test results available and properly labeled  Site marked: the operative site was marked  Radiology Images displayed and confirmed. If images not available, report reviewed: imaging studies available  Patient identity confirmed: verbally with patient    Supporting Documentation  Indications: tendon swelling and pain   Procedure Details  Condition:trigger finger Location: ring finger - L ring A1   Preparation: Patient was prepped and draped in the usual sterile fashion  Needle size: 22 G  Ultrasound guidance: no  Medications administered: 0.5 mL lidocaine 1 %; 20 mg triamcinolone acetonide 40 mg/mL    Patient tolerance: patient tolerated the procedure well with no immediate complications  Dressing:  Sterile dressing applied         Medical, Surgical, Family, and Social History  The patient's medical history, family history, and social history, were reviewed and updated as appropriate.     Past Medical History:   Diagnosis Date   • Anxiety • Disease of thyroid gland    • GERD (gastroesophageal reflux disease)    • H/O bilateral hip replacements    • Non Hodgkin's lymphoma ([address])        Past Surgical History:   Procedure Laterality Date   • BACK SURGERY      lumbar spine   • FL RETROGRADE PYELOGRAM  4/11/2023   • HIP SURGERY Bilateral    • JOINT REPLACEMENT     • KNEE SURGERY Right    • [place] CYSTO/URETERO W/LITHOTRIPSY &INDWELL STENT INSRT Right 4/11/2023    Procedure: CYSTOSCOPY URETEROSCOPY WITH RETROGRADE PYELOGRAM AND INSERTION STENT URETERAL, STONE BASKET EXTRACTION;  Surgeon: Vinod River MD;  [facility];  Service: Urology   • TOTAL SHOULDER REPLACEMENT Right        Family History   Problem Relation Age of Onset   • Lymphoma Mother    • No Known Problems Father    • No Known Problems Daughter    • No Known Problems Daughter    • No Known Problems Maternal Grandmother    • No Known Problems Maternal Grandfather    • No Known Problems Paternal Grandmother    • No Known Problems Paternal Grandfather    • No Known Problems Brother        Social History     Occupational History   • Not on file   Tobacco Use   • Smoking status: Never   • Smokeless tobacco: Never   Vaping Use   • Vaping Use: Never used   Substance and Sexual Activity   • Alcohol use: Not Currently     Comment: wine   • Drug use: Never   • Sexual activity: Not Currently       Allergies   Allergen Reactions   • Clindamycin Hives         Current Outpatient Medications:   •  divalproex sodium (Depakote ER) 500 mg 24 hr tablet, Take 1 tablet (500 mg total) by mouth daily, Disp: 90 tablet, Rfl: 1  •  famotidine (PEPCID) 20 mg tablet, Take 1 tablet (20 mg total) by mouth 2 (two) times a day, Disp: 180 tablet, Rfl: 1  •  levothyroxine 75 mcg tablet, Take 1 tablet (75 mcg total) by mouth daily in the early morning, Disp: 90 tablet, Rfl: 1  •  multivitamin (THERAGRAN) TABS, Take 1 tablet by mouth daily, Disp: , Rfl:   •  pantoprazole (PROTONIX) 40 mg tablet, Take 1 tablet (40 mg total) by mouth daily before breakfast, Disp: 90 tablet, Rfl: 1  •  traZODone (DESYREL) 50 mg tablet, Take 1 tablet (50 mg total) by mouth daily at bedtime as needed for sleep, Disp: 90 tablet, Rfl: 1  •  venlafaxine (EFFEXOR-XR) 150 mg 24 hr capsule, TAKE ONE CAPSULE BY MOUTH EVERY DAY, Disp: 90 capsule, Rfl: 0  •  amoxicillin (AMOXIL) 500 mg capsule, , Disp: , Rfl:   •  cyclobenzaprine (FLEXERIL) 10 mg tablet, Take 1 tablet (10 mg total) by mouth 3 (three) times a day as needed for muscle spasms, Disp: 30 tablet, Rfl: 0      Shayne Doherty PA-C

## 2023-08-10 NOTE — PATIENT INSTRUCTIONS
Trigger Finger   WHAT YOU NEED TO KNOW:   Trigger finger is when your finger or thumb gets stuck in a bent position and snaps, pops, or clicks when you straighten it. DISCHARGE INSTRUCTIONS:   Medicines:   NSAIDs:  These medicines decrease pain and swelling. NSAIDs can be bought without a doctor's order. Ask which medicine is right for you and how much to take. Take as directed. NSAIDs can cause stomach bleeding or kidney problems if not taken correctly. Take your medicine as directed. Contact your healthcare provider if you think your medicine is not helping or if you have side effects. Tell your provider if you are allergic to any medicine. Keep a list of the medicines, vitamins, and herbs you take. Include the amounts, and when and why you take them. Bring the list or the pill bottles to follow-up visits. Carry your medicine list with you in case of an emergency. Follow up with your healthcare provider or hand specialist as directed:  Write down your questions so you remember to ask them during your visits. Physical therapy  is used to teach you exercises to help improve movement and strength, and to decrease pain. Splint:  You may need to wear a splint for up to 6 weeks to keep your finger straight. This will help your finger joints rest and prevent you from bending your finger while you sleep. Contact your healthcare provider or hand specialist if:   Your symptoms do not go away or they return, even after treatment. The pain, swelling, or stiffness interferes with your daily activities. You have more trouble moving your finger. Your finger is tingling. You have questions or concerns about your condition or care. Return to the emergency department if:   You cannot move your finger at all. Your finger is numb. © Copyright [employer] 2022 Information is for End User's use only and may not be sold, redistributed or otherwise used for commercial purposes.   The above information is an  only. It is not intended as medical advice for individual conditions or treatments. Talk to your doctor, nurse or pharmacist before following any medical regimen to see if it is safe and effective for you.

## 2023-09-07 DIAGNOSIS — K21.9 GASTROESOPHAGEAL REFLUX DISEASE WITHOUT ESOPHAGITIS: ICD-10-CM

## 2023-09-07 DIAGNOSIS — F32.1 CURRENT MODERATE EPISODE OF MAJOR DEPRESSIVE DISORDER WITHOUT PRIOR EPISODE (HCC): ICD-10-CM

## 2023-09-07 RX ORDER — PANTOPRAZOLE SODIUM 40 MG/1
40 TABLET, DELAYED RELEASE ORAL
Qty: 90 TABLET | Refills: 1 | Status: SHIPPED | OUTPATIENT
Start: 2023-09-07

## 2023-09-07 RX ORDER — VENLAFAXINE HYDROCHLORIDE 150 MG/1
150 CAPSULE, EXTENDED RELEASE ORAL DAILY
Qty: 90 CAPSULE | Refills: 0 | Status: SHIPPED | OUTPATIENT
Start: 2023-09-07

## 2023-09-25 DIAGNOSIS — F32.1 CURRENT MODERATE EPISODE OF MAJOR DEPRESSIVE DISORDER WITHOUT PRIOR EPISODE (HCC): ICD-10-CM

## 2023-09-25 DIAGNOSIS — E03.9 HYPOTHYROIDISM, UNSPECIFIED TYPE: ICD-10-CM

## 2023-09-25 NOTE — TELEPHONE ENCOUNTER
Reason for call:   [x] Refill   [] Prior Auth  [] Other:     Office:   [x] PCP/Provider - Helio May[] Speciality/Provider -     Medication:   venlafazine 150mg, 1 qd  levothyrozine 75 mcg, 1 qd  Quantity: 90    [facility]    Does the patient have enough for 3 days?    [x] Yes   [] No - Send as HP to POD

## 2023-09-26 RX ORDER — VENLAFAXINE HYDROCHLORIDE 150 MG/1
150 CAPSULE, EXTENDED RELEASE ORAL DAILY
Qty: 90 CAPSULE | Refills: 1 | Status: SHIPPED | OUTPATIENT
Start: 2023-09-26

## 2023-09-26 RX ORDER — LEVOTHYROXINE SODIUM 0.07 MG/1
75 TABLET ORAL
Qty: 90 TABLET | Refills: 1 | Status: SHIPPED | OUTPATIENT
Start: 2023-09-26

## 2023-10-10 DIAGNOSIS — F33.41 RECURRENT MAJOR DEPRESSIVE DISORDER, IN PARTIAL REMISSION (HCC): ICD-10-CM

## 2023-10-10 RX ORDER — TRAZODONE HYDROCHLORIDE 50 MG/1
50 TABLET ORAL
Qty: 90 TABLET | Refills: 1 | Status: SHIPPED | OUTPATIENT
Start: 2023-10-10

## 2023-10-10 NOTE — TELEPHONE ENCOUNTER
Reason for call:   [x] Refill   [] Prior Auth  [] Other:     Office:   [x] PCP/Provider -   [] Speciality/Provider -     Medication: trazodone  Dose/Frequency:50mg daily at bedtime    Quantity: 90  [facility]: [facility]    Does the patient have enough for 3 days?    [x] Yes   [] No - Send as HP to POD disabled

## 2023-10-25 RX ORDER — AMOXICILLIN 500 MG/1
CAPSULE ORAL
Refills: 0 | OUTPATIENT
Start: 2023-10-25

## 2023-10-25 NOTE — TELEPHONE ENCOUNTER
Patient called refill line for PRN medication-Amoxicillin 500  mg  tablets that she uses prior to dental appointment. [facility] reports [facility] is [facility] from [facility] refill. Reason for call:   [x] Refill   [] Prior Auth  [] Other:     Office:   [x] PCP/Provider - Dr. Lamine Kaur   [] Specialty/Provider -     Medication: Amoxicillin 500 mg     Pharmacy: [facility]    Does the patient have enough for 3 days?    [] Yes   [x] No - Send as HP to POD

## 2023-11-06 ENCOUNTER — TELEPHONE (OUTPATIENT)
Dept: OTHER | Facility: HOSPITAL | Age: 73
End: 2023-11-06

## 2023-11-06 DIAGNOSIS — Z79.2 PROPHYLACTIC ANTIBIOTIC: Primary | ICD-10-CM

## 2023-11-06 RX ORDER — AMOXICILLIN 500 MG/1
TABLET, FILM COATED ORAL
Qty: 4 TABLET | Refills: 5 | Status: SHIPPED | OUTPATIENT
Start: 2023-11-06 | End: 2023-11-06

## 2023-11-06 NOTE — TELEPHONE ENCOUNTER
Reason for call:   [x] Refill   [] Prior Auth  [x] Other: Patient needs a prescription for Amoxicillin 500mg sent to her pharmacy to an up coming Dental appt. The last presrciption from 22 . Please review    Office:   [x] PCP/Provider - Lalo  [] Specialty/Provider -     Medication: Amoxil 500mg     Dose/Frequency: 4 capsules 1 hour prior to aylin    Quantity: 4    Pharmacy: [address]     Does the patient have enough for 3 days?    [] Yes   [x] No - Send as HP to POD

## 2023-11-07 NOTE — TELEPHONE ENCOUNTER
I have called in amoxil to [facility] with refills for future visits if needed. Please notify patient.

## 2023-12-21 RX ORDER — AMOXICILLIN 500 MG/1
CAPSULE ORAL
OUTPATIENT
Start: 2023-12-21

## 2024-01-29 ENCOUNTER — APPOINTMENT (OUTPATIENT)
Dept: LAB | Facility: MEDICAL CENTER | Age: 74
End: 2024-01-29
Payer: COMMERCIAL

## 2024-01-29 DIAGNOSIS — Z13.220 SCREENING CHOLESTEROL LEVEL: ICD-10-CM

## 2024-01-29 DIAGNOSIS — E03.4 HYPOTHYROIDISM DUE TO ACQUIRED ATROPHY OF THYROID: ICD-10-CM

## 2024-01-29 DIAGNOSIS — R53.83 OTHER FATIGUE: ICD-10-CM

## 2024-01-29 LAB
ALBUMIN SERPL BCP-MCNC: 4 G/DL (ref 3.5–5)
ALP SERPL-CCNC: 55 U/L (ref 34–104)
ALT SERPL W P-5'-P-CCNC: 14 U/L (ref 7–52)
ANION GAP SERPL CALCULATED.3IONS-SCNC: 3 MMOL/L
AST SERPL W P-5'-P-CCNC: 17 U/L (ref 13–39)
BACTERIA UR QL AUTO: ABNORMAL /HPF
BASOPHILS # BLD AUTO: 0.04 THOUSANDS/ÂΜL (ref 0–0.1)
BASOPHILS NFR BLD AUTO: 1 % (ref 0–1)
BILIRUB SERPL-MCNC: 0.42 MG/DL (ref 0.2–1)
BILIRUB UR QL STRIP: NEGATIVE
BUN SERPL-MCNC: 12 MG/DL (ref 5–25)
CALCIUM SERPL-MCNC: 9.9 MG/DL (ref 8.4–10.2)
CHLORIDE SERPL-SCNC: 105 MMOL/L (ref 96–108)
CHOLEST SERPL-MCNC: 197 MG/DL
CLARITY UR: CLEAR
CO2 SERPL-SCNC: 32 MMOL/L (ref 21–32)
COLOR UR: ABNORMAL
CREAT SERPL-MCNC: 0.66 MG/DL (ref 0.6–1.3)
EOSINOPHIL # BLD AUTO: 0.15 THOUSAND/ÂΜL (ref 0–0.61)
EOSINOPHIL NFR BLD AUTO: 3 % (ref 0–6)
ERYTHROCYTE [DISTWIDTH] IN BLOOD BY AUTOMATED COUNT: 12.3 % (ref 11.6–15.1)
GFR SERPL CREATININE-BSD FRML MDRD: 87 ML/MIN/1.73SQ M
GLUCOSE P FAST SERPL-MCNC: 91 MG/DL (ref 65–99)
GLUCOSE UR STRIP-MCNC: NEGATIVE MG/DL
HCT VFR BLD AUTO: 50.4 % (ref 34.8–46.1)
HDLC SERPL-MCNC: 72 MG/DL
HGB BLD-MCNC: 16.2 G/DL (ref 11.5–15.4)
HGB UR QL STRIP.AUTO: NEGATIVE
IMM GRANULOCYTES # BLD AUTO: 0.01 THOUSAND/UL (ref 0–0.2)
IMM GRANULOCYTES NFR BLD AUTO: 0 % (ref 0–2)
KETONES UR STRIP-MCNC: NEGATIVE MG/DL
LDH SERPL-CCNC: 187 U/L (ref 140–271)
LDLC SERPL CALC-MCNC: 102 MG/DL (ref 0–100)
LEUKOCYTE ESTERASE UR QL STRIP: NEGATIVE
LYMPHOCYTES # BLD AUTO: 3.28 THOUSANDS/ÂΜL (ref 0.6–4.47)
LYMPHOCYTES NFR BLD AUTO: 57 % (ref 14–44)
MAGNESIUM SERPL-MCNC: 2.2 MG/DL (ref 1.9–2.7)
MCH RBC QN AUTO: 30.6 PG (ref 26.8–34.3)
MCHC RBC AUTO-ENTMCNC: 32.1 G/DL (ref 31.4–37.4)
MCV RBC AUTO: 95 FL (ref 82–98)
MONOCYTES # BLD AUTO: 0.43 THOUSAND/ÂΜL (ref 0.17–1.22)
MONOCYTES NFR BLD AUTO: 8 % (ref 4–12)
NEUTROPHILS # BLD AUTO: 1.76 THOUSANDS/ÂΜL (ref 1.85–7.62)
NEUTS SEG NFR BLD AUTO: 31 % (ref 43–75)
NITRITE UR QL STRIP: NEGATIVE
NON-SQ EPI CELLS URNS QL MICRO: ABNORMAL /HPF
NRBC BLD AUTO-RTO: 0 /100 WBCS
PH UR STRIP.AUTO: 7 [PH]
PLATELET # BLD AUTO: 150 THOUSANDS/UL (ref 149–390)
PMV BLD AUTO: 10.2 FL (ref 8.9–12.7)
POTASSIUM SERPL-SCNC: 5.4 MMOL/L (ref 3.5–5.3)
PROT SERPL-MCNC: 6.8 G/DL (ref 6.4–8.4)
PROT UR STRIP-MCNC: NEGATIVE MG/DL
RBC # BLD AUTO: 5.29 MILLION/UL (ref 3.81–5.12)
RBC #/AREA URNS AUTO: ABNORMAL /HPF
SODIUM SERPL-SCNC: 140 MMOL/L (ref 135–147)
SP GR UR STRIP.AUTO: 1.01 (ref 1–1.03)
TRIGL SERPL-MCNC: 116 MG/DL
TSH SERPL DL<=0.05 MIU/L-ACNC: 1.33 UIU/ML (ref 0.45–4.5)
URATE SERPL-MCNC: 5.1 MG/DL (ref 2–7.5)
UROBILINOGEN UR STRIP-ACNC: <2 MG/DL
WBC # BLD AUTO: 5.67 THOUSAND/UL (ref 4.31–10.16)
WBC #/AREA URNS AUTO: ABNORMAL /HPF

## 2024-01-29 PROCEDURE — 80061 LIPID PANEL: CPT

## 2024-01-29 PROCEDURE — 83735 ASSAY OF MAGNESIUM: CPT

## 2024-01-29 PROCEDURE — 84443 ASSAY THYROID STIM HORMONE: CPT

## 2024-01-29 PROCEDURE — 84550 ASSAY OF BLOOD/URIC ACID: CPT

## 2024-02-09 DIAGNOSIS — F31.31 BIPOLAR AFFECTIVE DISORDER, CURRENTLY DEPRESSED, MILD (HCC): ICD-10-CM

## 2024-02-09 RX ORDER — DIVALPROEX SODIUM 500 MG/1
500 TABLET, EXTENDED RELEASE ORAL DAILY
Qty: 90 TABLET | Refills: 0 | Status: SHIPPED | OUTPATIENT
Start: 2024-02-09

## 2024-02-15 ENCOUNTER — RA CDI HCC (OUTPATIENT)
Dept: OTHER | Facility: HOSPITAL | Age: 74
End: 2024-02-15

## 2024-02-24 ENCOUNTER — RA CDI HCC (OUTPATIENT)
Dept: OTHER | Facility: HOSPITAL | Age: 74
End: 2024-02-24

## 2024-02-27 NOTE — PROGRESS NOTES
Name: Renay Herrear      : 1950      MRN: [id number]  Encounter Provider: Jamison May MD  Encounter Date: 2024   Encounter department: [facility]    Assessment & Plan     1. Anxiety  Assessment & Plan:  Patient to continue utilizing medical therapy as well as counseling sources as applicable to condition. If suicidal thought or fear of suicide attempt, to call 911 and seek help immediately. Medications and therapy reviewed today and all patient  questions answered today.       2. Well adult exam    3. Bipolar affective disorder, currently depressed, mild (HCC)  Assessment & Plan:  Patient to continue utilizing medical therapy as well as counseling sources as applicable to condition. If suicidal thought or fear of suicide attempt, to call 911 and seek help immediately. Medications and therapy reviewed today and all patient  questions answered today.       4. Gastroesophageal reflux disease, unspecified whether esophagitis present  Assessment & Plan:  Patient to continue with present therapy and decrease caffeine, avoid ETOH and smoking to decrease acid production. Pt should also cease eating prior to bedtime and avoid excessive fluid intake prior to sleep. May use antacids as needed for breakthrough GERD. All pateint questions answered today about this condition.     Orders:  -     famotidine (PEPCID) 20 mg tablet; Take 1 tablet (20 mg total) by mouth 2 (two) times a day  -     dicyclomine (BENTYL) 10 mg capsule; Take 1 capsule (10 mg total) by mouth daily    5. Hypothyroidism due to acquired atrophy of thyroid  Assessment & Plan:  Patient to continue current dose of thyroid medicine and recheck TSH in 6 months       6. Small B-cell lymphoma, unspecified body region (HCC)  Assessment & Plan:  Patient is stable  and will continue present plan of care and reassess at next routine visit. All questions about this problem from patient were answered today.       7.  Gastroesophageal reflux disease without esophagitis  Assessment & Plan:  Patient to continue with present therapy and decrease caffeine, avoid ETOH and smoking to decrease acid production. Pt should also cease eating prior to bedtime and avoid excessive fluid intake prior to sleep. May use antacids as needed for breakthrough GERD. All pateint questions answered today about this condition.     Orders:  -     pantoprazole (PROTONIX) 40 mg tablet; Take 1 tablet (40 mg total) by mouth daily before breakfast    8. Epigastric pain  -     famotidine (PEPCID) 20 mg tablet; Take 1 tablet (20 mg total) by mouth 2 (two) times a day  -     dicyclomine (BENTYL) 10 mg capsule; Take 1 capsule (10 mg total) by mouth daily    9. Sacroiliitis, not elsewhere classified (HCC)    10. Thrombocytopenia (HCC)        Depression Screening and Follow-up Plan: Patient assessed for underlying major depression. Brief counseling provided and recommend additional follow-up/re-evaluation next office visit. Patient advised to follow-up with PCP for further management.     Falls Plan of Care: balance, strength, and gait training instructions were provided. Home safety education provided.     Urinary Incontinence Plan of Care: counseling topics discussed: practice Kegel (pelvic floor strengthening) exercises, use restroom every 2 hours, limit alcohol, caffeine, spicy foods, and acidic foods, limiting fluid intake 3-4 hours before bed, weight loss, preventing constipation and limiting fluid intake to 60 oz. per day.       Subjective     HPI  Review of Systems    Past Medical History:   Diagnosis Date   • Anxiety    • Disease of thyroid gland    • GERD (gastroesophageal reflux disease)    • H/O bilateral hip replacements    • Non Hodgkin's lymphoma (HCC)      Past Surgical History:   Procedure Laterality Date   • BACK SURGERY      lumbar spine   • FL RETROGRADE PYELOGRAM  4/11/2023   • HIP SURGERY Bilateral    • JOINT REPLACEMENT     • KNEE SURGERY  Right    • ND CYSTO/URETERO W/LITHOTRIPSY &INDWELL STENT INSRT Right 4/11/2023    Procedure: CYSTOSCOPY URETEROSCOPY WITH RETROGRADE PYELOGRAM AND INSERTION STENT URETERAL, STONE BASKET EXTRACTION;  Surgeon: Hung Lau MD;  Location: [facility];  Service: Urology   • TOTAL SHOULDER REPLACEMENT Right      Family History   Problem Relation Age of Onset   • Lymphoma Mother    • No Known Problems Father    • No Known Problems Daughter    • No Known Problems Daughter    • No Known Problems Maternal Grandmother    • No Known Problems Maternal Grandfather    • No Known Problems Paternal Grandmother    • No Known Problems Paternal Grandfather    • No Known Problems Brother      Social History     Socioeconomic History   • Marital status: /Civil Union     Spouse name: None   • Number of children: None   • Years of education: None   • Highest education level: None   Occupational History   • None   Tobacco Use   • Smoking status: Never     Passive exposure: Never   • Smokeless tobacco: Never   Vaping Use   • Vaping status: Never Used   Substance and Sexual Activity   • Alcohol use: Not Currently     Comment: wine   • Drug use: Never   • Sexual activity: Not Currently   Other Topics Concern   • None   Social History Narrative   • None     Social Determinants of Health     Financial Resource Strain: Low Risk  (2/28/2024)    Overall Financial Resource Strain (CARDIA)    • Difficulty of Paying Living Expenses: Not hard at all   Food Insecurity: No Food Insecurity (10/21/2019)    Received from [facility]    Hunger Vital Sign    • Worried About Running Out of Food in the Last Year: Never true    • Ran Out of Food in the Last Year: Never true   Transportation Needs: No Transportation Needs (2/28/2024)    PRAPARE - Transportation    • Lack of Transportation (Medical): No    • Lack of Transportation (Non-Medical): No   Physical Activity: Not on file   Stress: Not on file   Social Connections: Not on file   Intimate Partner  Violence: Not on file   Housing Stability: Not on file     Current Outpatient Medications on File Prior to Visit   Medication Sig   • divalproex sodium (DEPAKOTE ER) 500 mg 24 hr tablet Take 1 tablet (500 mg total) by mouth daily   • levothyroxine 75 mcg tablet Take 1 tablet (75 mcg total) by mouth daily in the early morning   • multivitamin (THERAGRAN) TABS Take 1 tablet by mouth daily   • traZODone (DESYREL) 50 mg tablet Take 1 tablet (50 mg total) by mouth daily at bedtime as needed for sleep   • venlafaxine (EFFEXOR-XR) 150 mg 24 hr capsule Take 1 capsule (150 mg total) by mouth daily   • [DISCONTINUED] famotidine (PEPCID) 20 mg tablet Take 1 tablet (20 mg total) by mouth 2 (two) times a day   • [DISCONTINUED] pantoprazole (PROTONIX) 40 mg tablet Take 1 tablet (40 mg total) by mouth daily before breakfast   • amoxicillin (AMOXIL) 500 mg capsule  (Patient not taking: Reported on 6/4/2023)   • amoxicillin (AMOXIL) 500 MG tablet 4 TABS BY MOUTH 1 HOUR PRIOR TO DENTAL PROCEDURE (Patient not taking: Reported on 2/28/2024)   • cyclobenzaprine (FLEXERIL) 10 mg tablet Take 1 tablet (10 mg total) by mouth 3 (three) times a day as needed for muscle spasms     Allergies   Allergen Reactions   • Clindamycin Hives     Immunization History   Administered Date(s) Administered   • COVID-19 MODERNA VACC 0.5 ML IM 01/28/2021, 03/05/2021, 11/10/2021   • COVID-19 Moderna Vac BIVALENT 12 Yr+ IM 0.5 ML 11/04/2022   • COVID-19 Moderna mRNA Vaccine 12 Yr+ 50 mcg/0.5 mL (Spikevax) 12/01/2023   • H1N1 Inj 02/16/2010   • INFLUENZA 10/20/2022   • Influenza Quadrivalent Preservative Free 3 years and older IM 11/18/2015, 10/12/2016, 10/13/2017, 11/08/2018   • Influenza, Seasonal Vaccine, Quadrivalent, Adjuvanted, .5e 10/10/2023   • Influenza, high dose seasonal 0.7 mL 10/24/2021, 10/20/2022   • Influenza, seasonal, injectable 10/24/2006, 10/26/2007, 11/12/2008, 10/15/2009, 10/12/2010, 11/22/2011, 12/13/2012, 11/14/2013, 10/22/2014   •  "Pneumococcal Conjugate 13-Valent 11/18/2015   • Pneumococcal Polysaccharide PPV23 01/01/2004, 11/18/2014   • Respiratory Syncytial Virus Vaccine (Recombinant, Adjuvanted) 12/01/2023   • TD (adult) Preservative Free 12/07/2017   • Tdap 07/27/2007, 10/21/2019   • Zoster Vaccine Recombinant 12/04/2018, 10/28/2019, 02/04/2020   • influenza, trivalent, adjuvanted 10/21/2019       Objective     /82 (BP Location: Left arm, Patient Position: Sitting, Cuff Size: Standard)   Pulse 68   Temp 97.6 °F (36.4 °C) (Skin)   Ht 5' 5\" (1.651 m)   Wt 98.4 kg (217 lb)   SpO2 99%   BMI 36.11 kg/m²     Physical Exam  Jamison May MD    "

## 2024-02-28 ENCOUNTER — OFFICE VISIT (OUTPATIENT)
Dept: FAMILY MEDICINE CLINIC | Facility: CLINIC | Age: 74
End: 2024-02-28
Payer: COMMERCIAL

## 2024-02-28 VITALS
HEART RATE: 68 BPM | DIASTOLIC BLOOD PRESSURE: 82 MMHG | TEMPERATURE: 97.6 F | WEIGHT: 217 LBS | SYSTOLIC BLOOD PRESSURE: 116 MMHG | HEIGHT: 65 IN | BODY MASS INDEX: 36.15 KG/M2 | OXYGEN SATURATION: 99 %

## 2024-02-28 DIAGNOSIS — Z00.00 WELL ADULT EXAM: ICD-10-CM

## 2024-02-28 DIAGNOSIS — M46.1 SACROILIITIS, NOT ELSEWHERE CLASSIFIED (HCC): ICD-10-CM

## 2024-02-28 DIAGNOSIS — D69.6 THROMBOCYTOPENIA (HCC): ICD-10-CM

## 2024-02-28 DIAGNOSIS — R10.13 EPIGASTRIC PAIN: ICD-10-CM

## 2024-02-28 DIAGNOSIS — K21.9 GASTROESOPHAGEAL REFLUX DISEASE, UNSPECIFIED WHETHER ESOPHAGITIS PRESENT: ICD-10-CM

## 2024-02-28 DIAGNOSIS — C83.00 SMALL B-CELL LYMPHOMA, UNSPECIFIED BODY REGION (HCC): ICD-10-CM

## 2024-02-28 DIAGNOSIS — F31.31 BIPOLAR AFFECTIVE DISORDER, CURRENTLY DEPRESSED, MILD (HCC): ICD-10-CM

## 2024-02-28 DIAGNOSIS — E03.4 HYPOTHYROIDISM DUE TO ACQUIRED ATROPHY OF THYROID: ICD-10-CM

## 2024-02-28 DIAGNOSIS — K21.9 GASTROESOPHAGEAL REFLUX DISEASE WITHOUT ESOPHAGITIS: ICD-10-CM

## 2024-02-28 DIAGNOSIS — F41.9 ANXIETY: Primary | ICD-10-CM

## 2024-02-28 PROCEDURE — 99214 OFFICE O/P EST MOD 30 MIN: CPT | Performed by: FAMILY MEDICINE

## 2024-02-28 PROCEDURE — G0439 PPPS, SUBSEQ VISIT: HCPCS | Performed by: FAMILY MEDICINE

## 2024-02-28 RX ORDER — FAMOTIDINE 20 MG/1
20 TABLET, FILM COATED ORAL 2 TIMES DAILY
Qty: 180 TABLET | Refills: 1 | Status: SHIPPED | OUTPATIENT
Start: 2024-02-28

## 2024-02-28 RX ORDER — AMOXICILLIN 500 MG/1
TABLET, FILM COATED ORAL
COMMUNITY
Start: 2024-01-22

## 2024-02-28 RX ORDER — PANTOPRAZOLE SODIUM 40 MG/1
40 TABLET, DELAYED RELEASE ORAL
Qty: 90 TABLET | Refills: 1 | Status: SHIPPED | OUTPATIENT
Start: 2024-02-28

## 2024-02-28 RX ORDER — DICYCLOMINE HYDROCHLORIDE 10 MG/1
10 CAPSULE ORAL DAILY
Qty: 90 CAPSULE | Refills: 1 | Status: SHIPPED | OUTPATIENT
Start: 2024-02-28

## 2024-02-28 NOTE — PATIENT INSTRUCTIONS
Medicare Preventive Visit Patient Instructions  Thank you for completing your Welcome to Medicare Visit or Medicare Annual Wellness Visit today. Your next wellness visit will be due in one year (2/28/2025).  The screening/preventive services that you may require over the next 5-10 years are detailed below. Some tests may not apply to you based off risk factors and/or age. Screening tests ordered at today's visit but not completed yet may show as past due. Also, please note that scanned in results may not display below.  Preventive Screenings:  Service Recommendations Previous Testing/Comments   Colorectal Cancer Screening  * Colonoscopy    * Fecal Occult Blood Test (FOBT)/Fecal Immunochemical Test (FIT)  * Fecal DNA/Cologuard Test  * Flexible Sigmoidoscopy Age: 45-75 years old   Colonoscopy: every 10 years (may be performed more frequently if at higher risk)  OR  FOBT/FIT: every 1 year  OR  Cologuard: every 3 years  OR  Sigmoidoscopy: every 5 years  Screening may be recommended earlier than age 45 if at higher risk for colorectal cancer. Also, an individualized decision between you and your healthcare provider will decide whether screening between the ages of 76-85 would be appropriate. Colonoscopy: Not on file  FOBT/FIT: Not on file  Cologuard: 12/17/2021  Sigmoidoscopy: Not on file    Screening Current     Breast Cancer Screening Age: 40+ years old  Frequency: every 1-2 years  Not required if history of left and right mastectomy Mammogram: 05/17/2023    Screening Current   Cervical Cancer Screening Between the ages of 21-29, pap smear recommended once every 3 years.   Between the ages of 30-65, can perform pap smear with HPV co-testing every 5 years.   Recommendations may differ for women with a history of total hysterectomy, cervical cancer, or abnormal pap smears in past. Pap Smear: Not on file    Screening Not Indicated   Hepatitis C Screening Once for adults born between 1945 and 1965  More frequently in  patients at high risk for Hepatitis C Hep C Antibody: 02/02/2022    Screening Current   Diabetes Screening 1-2 times per year if you're at risk for diabetes or have pre-diabetes Fasting glucose: 91 mg/dL (1/29/2024)  A1C: 5.4 % (7/12/2021)  Screening Current   Cholesterol Screening Once every 5 years if you don't have a lipid disorder. May order more often based on risk factors. Lipid panel: 01/29/2024    Screening Current     Other Preventive Screenings Covered by Medicare:  Abdominal Aortic Aneurysm (AAA) Screening: covered once if your at risk. You're considered to be at risk if you have a family history of AAA.  Lung Cancer Screening: covers low dose CT scan once per year if you meet all of the following conditions: (1) Age 55-77; (2) No signs or symptoms of lung cancer; (3) Current smoker or have quit smoking within the last 15 years; (4) You have a tobacco smoking history of at least 20 pack years (packs per day multiplied by number of years you smoked); (5) You get a written order from a healthcare provider.  Glaucoma Screening: covered annually if you're considered high risk: (1) You have diabetes OR (2) Family history of glaucoma OR (3)  aged 50 and older OR (4)  American aged 65 and older  Osteoporosis Screening: covered every 2 years if you meet one of the following conditions: (1) You're estrogen deficient and at risk for osteoporosis based off medical history and other findings; (2) Have a vertebral abnormality; (3) On glucocorticoid therapy for more than 3 months; (4) Have primary hyperparathyroidism; (5) On osteoporosis medications and need to assess response to drug therapy.   Last bone density test (DXA Scan): 08/02/2022.  HIV Screening: covered annually if you're between the age of 15-65. Also covered annually if you are younger than 15 and older than 65 with risk factors for HIV infection. For pregnant patients, it is covered up to 3 times per  pregnancy.    Immunizations:  Immunization Recommendations   Influenza Vaccine Annual influenza vaccination during flu season is recommended for all persons aged >= 6 months who do not have contraindications   Pneumococcal Vaccine   * Pneumococcal conjugate vaccine = PCV13 (Prevnar 13), PCV15 (Vaxneuvance), PCV20 (Prevnar 20)  * Pneumococcal polysaccharide vaccine = PPSV23 (Pneumovax) Adults 19-65 yo with certain risk factors or if 65+ yo  If never received any pneumonia vaccine: recommend Prevnar 20 (PCV20)  Give PCV20 if previously received 1 dose of PCV13 or PPSV23   Hepatitis B Vaccine 3 dose series if at intermediate or high risk (ex: diabetes, end stage renal disease, liver disease)   Respiratory syncytial virus (RSV) Vaccine - COVERED BY MEDICARE PART D  * RSVPreF3 (Arexvy) CDC recommends that adults 60 years of age and older may receive a single dose of RSV vaccine using shared clinical decision-making (SCDM)   Tetanus (Td) Vaccine - COST NOT COVERED BY MEDICARE PART B Following completion of primary series, a booster dose should be given every 10 years to maintain immunity against tetanus. Td may also be given as tetanus wound prophylaxis.   Tdap Vaccine - COST NOT COVERED BY MEDICARE PART B Recommended at least once for all adults. For pregnant patients, recommended with each pregnancy.   Shingles Vaccine (Shingrix) - COST NOT COVERED BY MEDICARE PART B  2 shot series recommended in those 19 years and older who have or will have weakened immune systems or those 50 years and older     Health Maintenance Due:      Topic Date Due   • Breast Cancer Screening: Mammogram  05/17/2024   • Colorectal Cancer Screening  12/17/2024   • Hepatitis C Screening  Completed     Immunizations Due:      Topic Date Due   • Influenza Vaccine (1) 09/01/2023   • COVID-19 Vaccine (6 - 2023-24 season) 01/26/2024     Advance Directives   What are advance directives?  Advance directives are legal documents that state your wishes and  plans for medical care. These plans are made ahead of time in case you lose your ability to make decisions for yourself. Advance directives can apply to any medical decision, such as the treatments you want, and if you want to donate organs.   What are the types of advance directives?  There are many types of advance directives, and each state has rules about how to use them. You may choose a combination of any of the following:  Living will:  This is a written record of the treatment you want. You can also choose which treatments you do not want, which to limit, and which to stop at a certain time. This includes surgery, medicine, IV fluid, and tube feedings.   Durable power of  for healthcare (DPAHC):  This is a written record that states who you want to make healthcare choices for you when you are unable to make them for yourself. This person, called a proxy, is usually a family member or a friend. You may choose more than 1 proxy.  Do not resuscitate (DNR) order:  A DNR order is used in case your heart stops beating or you stop breathing. It is a request not to have certain forms of treatment, such as CPR. A DNR order may be included in other types of advance directives.  Medical directive:  This covers the care that you want if you are in a coma, near death, or unable to make decisions for yourself. You can list the treatments you want for each condition. Treatment may include pain medicine, surgery, blood transfusions, dialysis, IV or tube feedings, and a ventilator (breathing machine).  Values history:  This document has questions about your views, beliefs, and how you feel and think about life. This information can help others choose the care that you would choose.  Why are advance directives important?  An advance directive helps you control your care. Although spoken wishes may be used, it is better to have your wishes written down. Spoken wishes can be misunderstood, or not followed. Treatments  may be given even if you do not want them. An advance directive may make it easier for your family to make difficult choices about your care.   Weight Management   Why it is important to manage your weight:  Being overweight increases your risk of health conditions such as heart disease, high blood pressure, type 2 diabetes, and certain types of cancer. It can also increase your risk for osteoarthritis, sleep apnea, and other respiratory problems. Aim for a slow, steady weight loss. Even a small amount of weight loss can lower your risk of health problems.  How to lose weight safely:  A safe and healthy way to lose weight is to eat fewer calories and get regular exercise. You can lose up about 1 pound a week by decreasing the number of calories you eat by 500 calories each day.   Healthy meal plan for weight management:  A healthy meal plan includes a variety of foods, contains fewer calories, and helps you stay healthy. A healthy meal plan includes the following:  Eat whole-grain foods more often.  A healthy meal plan should contain fiber. Fiber is the part of grains, fruits, and vegetables that is not broken down by your body. Whole-grain foods are healthy and provide extra fiber in your diet. Some examples of whole-grain foods are whole-wheat breads and pastas, oatmeal, brown rice, and bulgur.  Eat a variety of vegetables every day.  Include dark, leafy greens such as spinach, kale, neil greens, and mustard greens. Eat yellow and orange vegetables such as carrots, sweet potatoes, and winter squash.   Eat a variety of fruits every day.  Choose fresh or canned fruit (canned in its own juice or light syrup) instead of juice. Fruit juice has very little or no fiber.  Eat low-fat dairy foods.  Drink fat-free (skim) milk or 1% milk. Eat fat-free yogurt and low-fat cottage cheese. Try low-fat cheeses such as mozzarella and other reduced-fat cheeses.  Choose meat and other protein foods that are low in fat.  Choose  beans or other legumes such as split peas or lentils. Choose fish, skinless poultry (chicken or turkey), or lean cuts of red meat (beef or pork). Before you cook meat or poultry, cut off any visible fat.   Use less fat and oil.  Try baking foods instead of frying them. Add less fat, such as margarine, sour cream, regular salad dressing and mayonnaise to foods. Eat fewer high-fat foods. Some examples of high-fat foods include french fries, doughnuts, ice cream, and cakes.  Eat fewer sweets.  Limit foods and drinks that are high in sugar. This includes candy, cookies, regular soda, and sweetened drinks.  Exercise:  Exercise at least 30 minutes per day on most days of the week. Some examples of exercise include walking, biking, dancing, and swimming. You can also fit in more physical activity by taking the stairs instead of the elevator or parking farther away from stores. Ask your healthcare provider about the best exercise plan for you.      © Copyright [employer] 2018 Information is for End User's use only and may not be sold, redistributed or otherwise used for commercial purposes. All illustrations and images included in CareNotes® are the copyrighted property of [employer].D[employer] or [employer]

## 2024-02-28 NOTE — PROGRESS NOTES
Assessment and Plan:     Problem List Items Addressed This Visit     Hypothyroidism     Patient to continue current dose of thyroid medicine and recheck TSH in 6 months          GERD (gastroesophageal reflux disease)     Patient to continue with present therapy and decrease caffeine, avoid ETOH and smoking to decrease acid production. Pt should also cease eating prior to bedtime and avoid excessive fluid intake prior to sleep. May use antacids as needed for breakthrough GERD. All pateint questions answered today about this condition.          Relevant Medications    pantoprazole (PROTONIX) 40 mg tablet    famotidine (PEPCID) 20 mg tablet    dicyclomine (BENTYL) 10 mg capsule    Bipolar affective disorder, currently depressed, mild (HCC)     Patient to continue utilizing medical therapy as well as counseling sources as applicable to condition. If suicidal thought or fear of suicide attempt, to call 911 and seek help immediately. Medications and therapy reviewed today and all patient  questions answered today.          Small B-cell lymphoma (HCC)     Patient is stable  and will continue present plan of care and reassess at next routine visit. All questions about this problem from patient were answered today.          Sacroiliitis, not elsewhere classified (HCC)    Thrombocytopenia (HCC)    Anxiety - Primary     Patient to continue utilizing medical therapy as well as counseling sources as applicable to condition. If suicidal thought or fear of suicide attempt, to call 911 and seek help immediately. Medications and therapy reviewed today and all patient  questions answered today.         Other Visit Diagnoses     Well adult exam        Epigastric pain        Relevant Medications    famotidine (PEPCID) 20 mg tablet    dicyclomine (BENTYL) 10 mg capsule           Preventive health issues were discussed with patient, and age appropriate screening tests were ordered as noted in patient's After Visit Summary.  Personalized  health advice and appropriate referrals for health education or preventive services given if needed, as noted in patient's After Visit Summary.     History of Present Illness:     Patient presents for a Medicare Wellness Visit    HPI   Patient Care Team:  Jamison May MD as PCP - General (Family Medicine)     Review of Systems:     Review of Systems     Problem List:     Patient Active Problem List   Diagnosis   • Hypothyroidism   • Current moderate episode of major depressive disorder without prior episode (HCC)   • GERD (gastroesophageal reflux disease)   • History of hip joint replacement by other means   • History of total knee arthroplasty, right   • Laryngopharyngeal reflux   • History of pulmonary embolism   • History of lumbosacral spine surgery   • Bipolar affective disorder, currently depressed, mild (HCC)   • Class 2 obesity without serious comorbidity in adult   • Small B-cell lymphoma (HCC)   • Sacroiliitis, not elsewhere classified (HCC)   • Thrombocytopenia (HCC)   • High ankle sprain of left lower extremity   • Right ureteral stone with hydronephrosis   • Hydronephrosis of right kidney   • Anxiety      Past Medical and Surgical History:     Past Medical History:   Diagnosis Date   • Anxiety    • Disease of thyroid gland    • GERD (gastroesophageal reflux disease)    • H/O bilateral hip replacements    • Non Hodgkin's lymphoma (HCC)      Past Surgical History:   Procedure Laterality Date   • BACK SURGERY      lumbar spine   • FL RETROGRADE PYELOGRAM  4/11/2023   • HIP SURGERY Bilateral    • JOINT REPLACEMENT     • KNEE SURGERY Right    • [place] CYSTO/URETERO W/LITHOTRIPSY &INDWELL STENT INSRT Right 4/11/2023    Procedure: CYSTOSCOPY URETEROSCOPY WITH RETROGRADE PYELOGRAM AND INSERTION STENT URETERAL, STONE BASKET EXTRACTION;  Surgeon: Hung Lau MD;  [facility];  Service: Urology   • TOTAL SHOULDER REPLACEMENT Right       Family History:     Family History   Problem Relation Age of  Onset   • Lymphoma Mother    • No Known Problems Father    • No Known Problems Daughter    • No Known Problems Daughter    • No Known Problems Maternal Grandmother    • No Known Problems Maternal Grandfather    • No Known Problems Paternal Grandmother    • No Known Problems Paternal Grandfather    • No Known Problems Brother       Social History:     Social History     Socioeconomic History   • Marital status: /Civil Union     Spouse name: None   • Number of children: None   • Years of education: None   • Highest education level: None   Occupational History   • None   Tobacco Use   • Smoking status: Never     Passive exposure: Never   • Smokeless tobacco: Never   Vaping Use   • Vaping status: Never Used   Substance and Sexual Activity   • Alcohol use: Not Currently     Comment: wine   • Drug use: Never   • Sexual activity: Not Currently   Other Topics Concern   • None   Social History Narrative   • None     Social Determinants of Health     Financial Resource Strain: Low Risk  (2/28/2024)    Overall Financial Resource Strain (CARDIA)    • Difficulty of Paying Living Expenses: Not hard at all   Food Insecurity: No Food Insecurity (10/21/2019)    Received from [facility]    Hunger Vital Sign    • Worried About Running Out of Food in the Last Year: Never true    • Ran Out of Food in the Last Year: Never true   Transportation Needs: No Transportation Needs (2/28/2024)    PRAPARE - Transportation    • Lack of Transportation (Medical): No    • Lack of Transportation (Non-Medical): No   Physical Activity: Not on file   Stress: Not on file   Social Connections: Not on file   Intimate Partner Violence: Not on file   Housing Stability: Not on file      Medications and Allergies:     Current Outpatient Medications   Medication Sig Dispense Refill   • dicyclomine (BENTYL) 10 mg capsule Take 1 capsule (10 mg total) by mouth daily 90 capsule 1   • divalproex sodium (DEPAKOTE ER) 500 mg 24 hr tablet Take 1 tablet (500 mg  total) by mouth daily 90 tablet 0   • famotidine (PEPCID) 20 mg tablet Take 1 tablet (20 mg total) by mouth 2 (two) times a day 180 tablet 1   • levothyroxine 75 mcg tablet Take 1 tablet (75 mcg total) by mouth daily in the early morning 90 tablet 1   • multivitamin (THERAGRAN) TABS Take 1 tablet by mouth daily     • pantoprazole (PROTONIX) 40 mg tablet Take 1 tablet (40 mg total) by mouth daily before breakfast 90 tablet 1   • traZODone (DESYREL) 50 mg tablet Take 1 tablet (50 mg total) by mouth daily at bedtime as needed for sleep 90 tablet 1   • venlafaxine (EFFEXOR-XR) 150 mg 24 hr capsule Take 1 capsule (150 mg total) by mouth daily 90 capsule 1   • amoxicillin (AMOXIL) 500 mg capsule  (Patient not taking: Reported on 6/4/2023)     • amoxicillin (AMOXIL) 500 MG tablet 4 TABS BY MOUTH 1 HOUR PRIOR TO DENTAL PROCEDURE (Patient not taking: Reported on 2/28/2024)     • cyclobenzaprine (FLEXERIL) 10 mg tablet Take 1 tablet (10 mg total) by mouth 3 (three) times a day as needed for muscle spasms 30 tablet 0     No current facility-administered medications for this visit.     Allergies   Allergen Reactions   • Clindamycin Hives      Immunizations:     Immunization History   Administered Date(s) Administered   • COVID-19 MODERNA VACC 0.5 ML IM 01/28/2021, 03/05/2021, 11/10/2021   • COVID-19 Moderna Vac BIVALENT 12 Yr+ IM 0.5 ML 11/04/2022   • COVID-19 Moderna mRNA Vaccine 12 Yr+ 50 mcg/0.5 mL (Spikevax) 12/01/2023   • H1N1 Inj 02/16/2010   • INFLUENZA 10/20/2022   • Influenza Quadrivalent Preservative Free 3 years and older IM 11/18/2015, 10/12/2016, 10/13/2017, 11/08/2018   • Influenza, Seasonal Vaccine, Quadrivalent, Adjuvanted, .5e 10/10/2023   • Influenza, high dose seasonal 0.7 mL 10/24/2021, 10/20/2022   • Influenza, seasonal, injectable 10/24/2006, 10/26/2007, 11/12/2008, 10/15/2009, 10/12/2010, 11/22/2011, 12/13/2012, 11/14/2013, 10/22/2014   • Pneumococcal Conjugate 13-Valent 11/18/2015   • Pneumococcal  Polysaccharide PPV23 01/01/2004, 11/18/2014   • Respiratory Syncytial Virus Vaccine (Recombinant, Adjuvanted) 12/01/2023   • TD (adult) Preservative Free 12/07/2017   • Tdap 07/27/2007, 10/21/2019   • Zoster Vaccine Recombinant 12/04/2018, 10/28/2019, 02/04/2020   • influenza, trivalent, adjuvanted 10/21/2019      Health Maintenance:         Topic Date Due   • Breast Cancer Screening: Mammogram  05/17/2024   • Colorectal Cancer Screening  12/17/2024   • Hepatitis C Screening  Completed         Topic Date Due   • Influenza Vaccine (1) 09/01/2023   • COVID-19 Vaccine (6 - 2023-24 season) 01/26/2024      Medicare Screening Tests and Risk Assessments:     Renay is here for her Subsequent Wellness visit.     Health Risk Assessment:   Patient rates overall health as good. Patient feels that their physical health rating is same. Patient is very satisfied with their life. Eyesight was rated as same. Hearing was rated as same. Patient feels that their emotional and mental health rating is same. Patients states they are never, rarely angry. Patient states they are sometimes unusually tired/fatigued. Pain experienced in the last 7 days has been none. Patient states that she has experienced weight loss or gain in last 6 months.     Depression Screening:   PHQ-9 Score: 0      Fall Risk Screening:   In the past year, patient has experienced: no history of falling in past year      Urinary Incontinence Screening:   Patient has not leaked urine accidently in the last six months.     Home Safety:  Patient does not have trouble with stairs inside or outside of their home. Patient has working smoke alarms and has working carbon monoxide detector. Home safety hazards include: none.     Nutrition:   Current diet is Regular.     Medications:   Patient is currently taking over-the-counter supplements. OTC medications include: see medication list. Patient is able to manage medications.     Activities of Daily Living (ADLs)/Instrumental  "Activities of Daily Living (IADLs):   Walk and transfer into and out of bed and chair?: Yes  Dress and groom yourself?: Yes    Bathe or shower yourself?: Yes    Feed yourself? Yes  Do your laundry/housekeeping?: Yes  Manage your money, pay your bills and track your expenses?: Yes  Make your own meals?: Yes    Do your own shopping?: Yes    Previous Hospitalizations:   Any hospitalizations or ED visits within the last 12 months?: No      Advance Care Planning:   Living will: No    Durable POA for healthcare: No      PREVENTIVE SCREENINGS      Cardiovascular Screening:    General: Screening Current      Diabetes Screening:     General: Screening Current      Colorectal Cancer Screening:     General: Screening Current      Breast Cancer Screening:     General: Screening Current      Cervical Cancer Screening:    General: Screening Not Indicated      Lung Cancer Screening:     General: Screening Not Indicated      Hepatitis C Screening:    General: Screening Current    Screening, Brief Intervention, and Referral to Treatment (SBIRT)    Screening      AUDIT-C Screenin) How often did you have a drink containing alcohol in the past year? never  2) How many drinks did you have on a typical day when you were drinking in the past year? 0  3) How often did you have 6 or more drinks on one occasion in the past year? never    AUDIT-C Score: 0  Interpretation: Score 0-2 (female): Negative screen for alcohol misuse    Single Item Drug Screening:  How often have you used an illegal drug (including marijuana) or a prescription medication for non-medical reasons in the past year? never    Single Item Drug Screen Score: 0  Interpretation: Negative screen for possible drug use disorder    No results found.     Physical Exam:     /82 (BP Location: Left arm, Patient Position: Sitting, Cuff Size: Standard)   Pulse 68   Temp 97.6 °F (36.4 °C) (Skin)   Ht 5' 5\" (1.651 m)   Wt 98.4 kg (217 lb)   SpO2 99%   BMI 36.11 kg/m² "     Physical Exam     Jamison May MD

## 2024-03-19 ENCOUNTER — OFFICE VISIT (OUTPATIENT)
Dept: HEMATOLOGY ONCOLOGY | Facility: CLINIC | Age: 74
End: 2024-03-19
Payer: COMMERCIAL

## 2024-03-19 VITALS
HEIGHT: 65 IN | OXYGEN SATURATION: 97 % | HEART RATE: 72 BPM | BODY MASS INDEX: 36.65 KG/M2 | WEIGHT: 220 LBS | SYSTOLIC BLOOD PRESSURE: 148 MMHG | RESPIRATION RATE: 17 BRPM | DIASTOLIC BLOOD PRESSURE: 92 MMHG | TEMPERATURE: 97.9 F

## 2024-03-19 DIAGNOSIS — C83.00 SMALL B-CELL LYMPHOMA, UNSPECIFIED BODY REGION (HCC): Primary | ICD-10-CM

## 2024-03-19 PROCEDURE — 99214 OFFICE O/P EST MOD 30 MIN: CPT | Performed by: INTERNAL MEDICINE

## 2024-03-19 NOTE — PROGRESS NOTES
Renay Herrera  1950  [address] [facility] [place]  [address]  [place] [id number]    DISCUSSION/SUMMARY:    73-year-old female with good general health previously diagnosed with SLL elsewhere (approximately 7 years ago).  Mrs. Herrera is well and clinically there are no concerning hematology findings, no adenopathy.  Blood work was good/acceptable.  The plan is to continue with surveillance.  Patient feels comfortable returning in 1 year with repeat blood work before.    Prior CBCs have demonstrated elevated H/H levels.  Patient denies any history of sleep apnea, hypoventilation, respiratory issues etc. The rest of the CBC parameters are good/WNL.  As above, the plan is to continue with surveillance.    Patient states that routine health maintenance and medical care is up-to-date.    Mrs. Herrera knows to call the hematology/oncology office if there are any other questions or concerns.    Carefully review your medication list and verify that the list is accurate and up-to-date. Please call the hematology/oncology office if there are medications missing from the list, medications on the list that you are not currently taking or if there is a dosage or instruction that is different from how you're taking that medication.    Patient goals and areas of care: SLL/CLL surveillance  Barriers to care: none  Patient is able to self-care.  ______________________________________________________________________________________    Chief Complaint   Patient presents with    Follow-up    SLL on surveillance     History of Present Illness: 73-year-old female previously diagnosed with SLL recently transferring her care to [facility].  Mrs. Herrera was diagnosed with SLL approximately 7 years ago up in [facility].  Patient more recently moved to [place].    Patient states feeling well, baseline.  Appetite is good, weight is stable.  Activities are baseline.  No respiratory  issues.  No fevers, chills or night sweats.  No recurrent or persistent infections.  No enlarged lymph nodes.  No headaches, blurred vision or dizziness.  Patient has had both hips replaced, no pain control issues.  Routine health maintenance and medical care is up-to-date.  Patient has received her COVID-vaccine.    Review of Systems   Constitutional: Negative.    HENT: Negative.     Eyes: Negative.    Respiratory: Negative.     Cardiovascular: Negative.    Gastrointestinal: Negative.    Endocrine: Negative.    Genitourinary: Negative.    Musculoskeletal: Negative.    Skin: Negative.    Allergic/Immunologic: Negative.    Neurological: Negative.    Hematological: Negative.    Psychiatric/Behavioral: Negative.     All other systems reviewed and are negative.    Patient Active Problem List   Diagnosis    Hypothyroidism    Current moderate episode of major depressive disorder without prior episode (HCC)    GERD (gastroesophageal reflux disease)    History of hip joint replacement by other means    History of total knee arthroplasty, right    Laryngopharyngeal reflux    History of pulmonary embolism    History of lumbosacral spine surgery    Bipolar affective disorder, currently depressed, mild ([facility])    Class 2 obesity without serious comorbidity in adult    Small B-cell lymphoma (HCC)    Sacroiliitis, not elsewhere classified (HCC)    Thrombocytopenia (HCC)    High ankle sprain of left lower extremity    Right ureteral stone with hydronephrosis    Hydronephrosis of right kidney    Anxiety     Past Medical History:   Diagnosis Date    Anxiety     Disease of thyroid gland     GERD (gastroesophageal reflux disease)     H/O bilateral hip replacements     Non Hodgkin's lymphoma (HCC)      Past Surgical History:   Procedure Laterality Date    BACK SURGERY      lumbar spine    FL RETROGRADE PYELOGRAM  4/11/2023    HIP SURGERY Bilateral     JOINT REPLACEMENT      KNEE SURGERY Right     AR CYSTO/URETERO W/LITHOTRIPSY &INDWELL  STENT INSRT Right 4/11/2023    Procedure: CYSTOSCOPY URETEROSCOPY WITH RETROGRADE PYELOGRAM AND INSERTION STENT URETERAL, STONE BASKET EXTRACTION;  Surgeon: Hung Lau MD;  [facility];  Service: Urology    TOTAL SHOULDER REPLACEMENT Right    Past surgical history: No prior blood transfusions    OB/GYN: No postmenopausal bleeding, no other GYN problems, no breast problems, no abnormal mammograms     Family History   Problem Relation Age of Onset    Lymphoma Mother     No Known Problems Father     No Known Problems Daughter     No Known Problems Daughter     No Known Problems Maternal Grandmother     No Known Problems Maternal Grandfather     No Known Problems Paternal Grandmother     No Known Problems Paternal Grandfather     No Known Problems Brother    Family history: No known familial or genetic diseases    Social History     Socioeconomic History    Marital status: /Civil Union     Spouse name: Not on file    Number of children: Not on file    Years of education: Not on file    Highest education level: Not on file   Occupational History    Not on file   Tobacco Use    Smoking status: Never     Passive exposure: Never    Smokeless tobacco: Never   Vaping Use    Vaping status: Never Used   Substance and Sexual Activity    Alcohol use: Not Currently     Comment: wine    Drug use: Never    Sexual activity: Not Currently   Other Topics Concern    Not on file   Social History Narrative    Not on file     Social Determinants of Health     Financial Resource Strain: Low Risk  (2/28/2024)    Overall Financial Resource Strain (CARDIA)     Difficulty of Paying Living Expenses: Not hard at all   Food Insecurity: No Food Insecurity (10/21/2019)    Received from [facility]    Hunger Vital Sign     Worried About Running Out of Food in the Last Year: Never true     Ran Out of Food in the Last Year: Never true   Transportation Needs: No Transportation Needs (2/28/2024)    PRAPARE - Transportation     Lack of  Transportation (Medical): No     Lack of Transportation (Non-Medical): No   Physical Activity: Not on file   Stress: Not on file   Social Connections: Not on file   Intimate Partner Violence: Not on file   Housing Stability: Not on file   Social history: No tobacco, alcohol or drug abuse, no toxic exposure    Current Outpatient Medications:     dicyclomine (BENTYL) 10 mg capsule, Take 1 capsule (10 mg total) by mouth daily, Disp: 90 capsule, Rfl: 1    divalproex sodium (DEPAKOTE ER) 500 mg 24 hr tablet, Take 1 tablet (500 mg total) by mouth daily, Disp: 90 tablet, Rfl: 0    famotidine (PEPCID) 20 mg tablet, Take 1 tablet (20 mg total) by mouth 2 (two) times a day, Disp: 180 tablet, Rfl: 1    levothyroxine 75 mcg tablet, Take 1 tablet (75 mcg total) by mouth daily in the early morning, Disp: 90 tablet, Rfl: 1    multivitamin (THERAGRAN) TABS, Take 1 tablet by mouth daily, Disp: , Rfl:     pantoprazole (PROTONIX) 40 mg tablet, Take 1 tablet (40 mg total) by mouth daily before breakfast, Disp: 90 tablet, Rfl: 1    traZODone (DESYREL) 50 mg tablet, Take 1 tablet (50 mg total) by mouth daily at bedtime as needed for sleep, Disp: 90 tablet, Rfl: 1    venlafaxine (EFFEXOR-XR) 150 mg 24 hr capsule, Take 1 capsule (150 mg total) by mouth daily, Disp: 90 capsule, Rfl: 1    amoxicillin (AMOXIL) 500 mg capsule, , Disp: , Rfl:     amoxicillin (AMOXIL) 500 MG tablet, 4 TABS BY MOUTH 1 HOUR PRIOR TO DENTAL PROCEDURE (Patient not taking: Reported on 2/28/2024), Disp: , Rfl:     cyclobenzaprine (FLEXERIL) 10 mg tablet, Take 1 tablet (10 mg total) by mouth 3 (three) times a day as needed for muscle spasms, Disp: 30 tablet, Rfl: 0    Allergies   Allergen Reactions    Clindamycin Hives       Vitals:    03/19/24 0910   BP: 148/92   Pulse: 72   Resp: 17   Temp: 97.9 °F (36.6 °C)   SpO2: 97%     Physical Exam  Constitutional:       Appearance: She is well-developed.   HENT:      Head: Normocephalic and atraumatic.      Right Ear: External  ear normal.      Left Ear: External ear normal.   Eyes:      Conjunctiva/sclera: Conjunctivae normal.      Pupils: Pupils are equal, round, and reactive to light.   Cardiovascular:      Rate and Rhythm: Normal rate and regular rhythm.      Heart sounds: Normal heart sounds.   Pulmonary:      Effort: Pulmonary effort is normal.      Breath sounds: Normal breath sounds.      Comments: Clear bilaterally  Abdominal:      General: Bowel sounds are normal.      Palpations: Abdomen is soft.      Comments: + Bowel sounds, nontender, soft, no palpable enlarged spleen, no guarding   Musculoskeletal:         General: Normal range of motion.      Cervical back: Normal range of motion and neck supple.   Skin:     General: Skin is warm.      Comments: Warm, moist, good color, no petechiae or ecchymoses   Neurological:      Mental Status: She is alert and oriented to person, place, and time.      Deep Tendon Reflexes: Reflexes are normal and symmetric.   Psychiatric:         Behavior: Behavior normal.         Thought Content: Thought content normal.         Judgment: Judgment normal.   Extremities: Lower extreme edema bilaterally, no cords, pulses are 1+  Lymphatics: No adenopathy in the neck, supraclavicular region, pre-/postauricular area, occipital area, axilla and groin bilaterally    Labs    1/29/2024 WBC = 5.67 hemoglobin = 16.2 hematocrit = 50.4 platelet = 150 neutrophil = 31% lymphocyte = 57% monocyte = 8% BUN = 12 creatinine = 0.66 calcium = 9.9 LFTs WNL LDH = 187    2/6/2023 WBC = 5.2 hemoglobin = 15.8 hematocrit = 50.3 MCV = 98 platelet = 129 neutrophil = 29% lymphocyte = 59% monocyte = 8% eosinophil = 3% basophil = 1% BUN = 26 creatinine = 0.67 calcium = 9.6 AST = 19 ALT = 23 alkaline phosphatase = 57 total protein = 6.3 albumin = 3.2 total bilirubin = 0.44    Imaging    No pertinent imaging for review    Pathology    4/15/2023 [facility] advanced chronic lymphoid neoplasm NGS report.  No mutations identified.  Tumor  mutational burden = low.  Microsatellite instability, MSI negative.    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  Comment stated that this is a favorable prognostic indicator and CLL.    4/14/2023 [facility] CLL panel  ZAP70 viability = 96%, stop 70+/CD19 + = 1%  CD30 +/CD5+ = 9%  Beta-2 microglobulin = 1.93  FISH: Low positive results for 13q14 deletion.  Negative for trisomy 12 and negative for deletions of 6 q, PARISH and T p53  46, XX[20]    4/4/2023 [facility] peripheral blood flow cytometry demonstrated monoclonal B-cells with the phenotype of chronic lymphocytic leukemia, diagnostic considerations include high count monoclonal B-cell lymphocytosis with CLL like phenotype.

## 2024-03-26 ENCOUNTER — OFFICE VISIT (OUTPATIENT)
Dept: FAMILY MEDICINE CLINIC | Facility: CLINIC | Age: 74
End: 2024-03-26
Payer: COMMERCIAL

## 2024-03-26 VITALS
RESPIRATION RATE: 16 BRPM | DIASTOLIC BLOOD PRESSURE: 82 MMHG | HEART RATE: 78 BPM | TEMPERATURE: 98.1 F | WEIGHT: 217 LBS | HEIGHT: 65 IN | BODY MASS INDEX: 36.15 KG/M2 | SYSTOLIC BLOOD PRESSURE: 120 MMHG | OXYGEN SATURATION: 99 %

## 2024-03-26 DIAGNOSIS — J01.10 SUBACUTE FRONTAL SINUSITIS: ICD-10-CM

## 2024-03-26 DIAGNOSIS — J06.9 URI WITH COUGH AND CONGESTION: Primary | ICD-10-CM

## 2024-03-26 LAB
SARS-COV-2 AG UPPER RESP QL IA: NEGATIVE
SL AMB POCT RAPID FLU A: NORMAL
SL AMB POCT RAPID FLU B: NORMAL
VALID CONTROL: NORMAL

## 2024-03-26 PROCEDURE — 87811 SARS-COV-2 COVID19 W/OPTIC: CPT | Performed by: NURSE PRACTITIONER

## 2024-03-26 PROCEDURE — 99213 OFFICE O/P EST LOW 20 MIN: CPT | Performed by: NURSE PRACTITIONER

## 2024-03-26 PROCEDURE — 87804 INFLUENZA ASSAY W/OPTIC: CPT | Performed by: NURSE PRACTITIONER

## 2024-03-26 RX ORDER — AMOXICILLIN 875 MG/1
875 TABLET, COATED ORAL 2 TIMES DAILY
Qty: 14 TABLET | Refills: 0 | Status: SHIPPED | OUTPATIENT
Start: 2024-03-26 | End: 2024-04-02

## 2024-03-26 NOTE — PROGRESS NOTES
Assessment/Plan:      Diagnoses and all orders for this visit:    URI with cough and congestion  -     POCT Rapid Covid Ag  -     Cancel: POCT Rapid Covid Ag  -     POCT rapid flu A and B    Subacute frontal sinusitis  -     amoxicillin (AMOXIL) 875 mg tablet; Take 1 tablet (875 mg total) by mouth 2 (two) times a day for 7 days        Uncomplicated sinusitis. Dosing all possible side effects of the prescribed medications or medications that had been prescribed in the past were reviewed and all questions were answered.  Patient verbalized agreement and understanding of the plan of care as outlined during the office visit today return to office as indicated or sooner if a problem arises.    Subjective:     Patient ID: Renay Herrera is a 73 y.o. female.      Patient is here with a complaint of upper respiratory tract discomfort has had a cough runny nose and some nasal congestion.  Denies any need nausea vomiting diarrhea chest pains or shortness of breath.  All over-the-counter medication attempted arm were unsuccessful.          Review of Systems   Constitutional:  Negative for appetite change, chills and fever.   HENT:  Positive for congestion, postnasal drip, rhinorrhea, sinus pressure and sinus pain. Negative for sore throat.    Eyes:  Negative for pain.   Respiratory:  Negative for cough and shortness of breath.    Cardiovascular:  Negative for chest pain.   Gastrointestinal:  Negative for abdominal pain.   Genitourinary:  Negative for dysuria.   Musculoskeletal:  Negative for arthralgias and myalgias.   Skin:  Negative for color change.   Neurological:  Negative for light-headedness.   Psychiatric/Behavioral:  Negative for behavioral problems.          Objective:     Physical Exam  Constitutional:       General: She is not in acute distress.     Appearance: She is not diaphoretic.   HENT:      Head: Atraumatic.      Nose: Mucosal edema, congestion and rhinorrhea present.      Right Sinus: Frontal sinus  tenderness present.      Left Sinus: Frontal sinus tenderness present.   Cardiovascular:      Rate and Rhythm: Normal rate and regular rhythm.      Heart sounds: Normal heart sounds.   Pulmonary:      Effort: Pulmonary effort is normal.      Breath sounds: Normal breath sounds.   Musculoskeletal:         General: Normal range of motion.      Cervical back: Normal range of motion.

## 2024-04-08 DIAGNOSIS — F33.41 RECURRENT MAJOR DEPRESSIVE DISORDER, IN PARTIAL REMISSION (HCC): ICD-10-CM

## 2024-04-08 RX ORDER — TRAZODONE HYDROCHLORIDE 50 MG/1
50 TABLET ORAL
Qty: 90 TABLET | Refills: 1 | Status: SHIPPED | OUTPATIENT
Start: 2024-04-08

## 2024-05-02 DIAGNOSIS — F31.31 BIPOLAR AFFECTIVE DISORDER, CURRENTLY DEPRESSED, MILD (HCC): ICD-10-CM

## 2024-05-03 RX ORDER — DIVALPROEX SODIUM 500 MG/1
500 TABLET, EXTENDED RELEASE ORAL DAILY
Qty: 90 TABLET | Refills: 0 | Status: SHIPPED | OUTPATIENT
Start: 2024-05-03

## 2024-05-07 ENCOUNTER — TELEPHONE (OUTPATIENT)
Age: 74
End: 2024-05-07

## 2024-05-07 DIAGNOSIS — Z12.31 SCREENING MAMMOGRAM FOR HIGH-RISK PATIENT: Primary | ICD-10-CM

## 2024-06-24 DIAGNOSIS — E03.9 HYPOTHYROIDISM, UNSPECIFIED TYPE: ICD-10-CM

## 2024-06-24 DIAGNOSIS — F32.1 CURRENT MODERATE EPISODE OF MAJOR DEPRESSIVE DISORDER WITHOUT PRIOR EPISODE (HCC): ICD-10-CM

## 2024-06-24 RX ORDER — LEVOTHYROXINE SODIUM 0.07 MG/1
TABLET ORAL
Qty: 90 TABLET | Refills: 1 | Status: SHIPPED | OUTPATIENT
Start: 2024-06-24

## 2024-06-24 RX ORDER — VENLAFAXINE HYDROCHLORIDE 150 MG/1
150 CAPSULE, EXTENDED RELEASE ORAL DAILY
Qty: 90 CAPSULE | Refills: 1 | Status: SHIPPED | OUTPATIENT
Start: 2024-06-24

## 2024-07-09 ENCOUNTER — OFFICE VISIT (OUTPATIENT)
Dept: FAMILY MEDICINE CLINIC | Facility: CLINIC | Age: 74
End: 2024-07-09
Payer: COMMERCIAL

## 2024-07-09 ENCOUNTER — APPOINTMENT (OUTPATIENT)
Dept: RADIOLOGY | Facility: MEDICAL CENTER | Age: 74
End: 2024-07-09
Payer: COMMERCIAL

## 2024-07-09 VITALS
HEART RATE: 69 BPM | TEMPERATURE: 97.8 F | DIASTOLIC BLOOD PRESSURE: 80 MMHG | HEIGHT: 65 IN | BODY MASS INDEX: 35.65 KG/M2 | RESPIRATION RATE: 18 BRPM | SYSTOLIC BLOOD PRESSURE: 136 MMHG | WEIGHT: 214 LBS | OXYGEN SATURATION: 97 %

## 2024-07-09 DIAGNOSIS — M54.2 CERVICALGIA: ICD-10-CM

## 2024-07-09 DIAGNOSIS — M62.838 NECK MUSCLE SPASM: ICD-10-CM

## 2024-07-09 DIAGNOSIS — M54.2 CERVICALGIA: Primary | ICD-10-CM

## 2024-07-09 PROCEDURE — G2211 COMPLEX E/M VISIT ADD ON: HCPCS | Performed by: NURSE PRACTITIONER

## 2024-07-09 PROCEDURE — 99213 OFFICE O/P EST LOW 20 MIN: CPT | Performed by: NURSE PRACTITIONER

## 2024-07-09 PROCEDURE — 72050 X-RAY EXAM NECK SPINE 4/5VWS: CPT

## 2024-07-09 RX ORDER — PREDNISONE 10 MG/1
10 TABLET ORAL 2 TIMES DAILY WITH MEALS
Qty: 10 TABLET | Refills: 0 | Status: SHIPPED | OUTPATIENT
Start: 2024-07-09 | End: 2024-07-14

## 2024-07-09 RX ORDER — AMOXICILLIN 500 MG/1
TABLET, FILM COATED ORAL
COMMUNITY
Start: 2024-06-26

## 2024-07-09 RX ORDER — CYCLOBENZAPRINE HCL 5 MG
5 TABLET ORAL 2 TIMES DAILY PRN
Qty: 15 TABLET | Refills: 0 | Status: SHIPPED | OUTPATIENT
Start: 2024-07-09

## 2024-07-09 NOTE — PROGRESS NOTES
Assessment/Plan:      Diagnoses and all orders for this visit:    Cervicalgia  -     XR spine cervical complete 4 or 5 vw non injury; Future  -     predniSONE 10 mg tablet; Take 1 tablet (10 mg total) by mouth 2 (two) times a day with meals for 5 days    Neck muscle spasm  -     XR spine cervical complete 4 or 5 vw non injury; Future  -     cyclobenzaprine (FLEXERIL) 5 mg tablet; Take 1 tablet (5 mg total) by mouth 2 (two) times a day as needed for muscle spasms    Uncomplicated cervicalgia. Imaging warranted and conservative oral steroids and PRN cyclobenzaprine. I will contact her with the results and next steps when available. Dosing all possible side effects of the prescribed medications or medications that had been prescribed in the past were reviewed and all questions were answered.  Patient verbalized agreement and understanding of the plan of care as outlined during the office visit today return to office as indicated or sooner if a problem arises.          Subjective:     Patient ID: Renay Herrera is a 73 y.o. female.    Pt being seen for neck pain and muscle tightness only on the l side. She denies any injury CP,SOB, Changes in bowel or bladder habits.    Neck Pain         Review of Systems   Musculoskeletal:  Positive for myalgias, neck pain and neck stiffness.         Objective:     Physical Exam  Cardiovascular:      Rate and Rhythm: Normal rate and regular rhythm.      Heart sounds: Normal heart sounds.   Pulmonary:      Breath sounds: Normal breath sounds.   Musculoskeletal:      Cervical back: Tenderness (l cervical pain reproducable) present.

## 2024-07-29 DIAGNOSIS — F31.31 BIPOLAR AFFECTIVE DISORDER, CURRENTLY DEPRESSED, MILD (HCC): ICD-10-CM

## 2024-07-29 RX ORDER — DIVALPROEX SODIUM 500 MG/1
500 TABLET, EXTENDED RELEASE ORAL DAILY
Qty: 100 TABLET | Refills: 1 | Status: SHIPPED | OUTPATIENT
Start: 2024-07-29

## 2024-07-31 ENCOUNTER — HOSPITAL ENCOUNTER (OUTPATIENT)
Dept: RADIOLOGY | Facility: MEDICAL CENTER | Age: 74
Discharge: HOME | End: 2024-07-31
Payer: COMMERCIAL

## 2024-07-31 VITALS — BODY MASS INDEX: 35.65 KG/M2 | HEIGHT: 65 IN | WEIGHT: 214 LBS

## 2024-07-31 DIAGNOSIS — Z12.31 ENCOUNTER FOR SCREENING MAMMOGRAM FOR MALIGNANT NEOPLASM OF BREAST: ICD-10-CM

## 2024-07-31 DIAGNOSIS — Z12.31 SCREENING MAMMOGRAM FOR HIGH-RISK PATIENT: ICD-10-CM

## 2024-07-31 PROCEDURE — 77063 BREAST TOMOSYNTHESIS BI: CPT

## 2024-07-31 PROCEDURE — 77067 SCR MAMMO BI INCL CAD: CPT

## 2024-08-01 ENCOUNTER — OFFICE VISIT (OUTPATIENT)
Dept: GASTROENTEROLOGY | Facility: CLINIC | Age: 74
End: 2024-08-01
Payer: COMMERCIAL

## 2024-08-01 VITALS
DIASTOLIC BLOOD PRESSURE: 85 MMHG | WEIGHT: 214 LBS | BODY MASS INDEX: 34.39 KG/M2 | HEART RATE: 72 BPM | HEIGHT: 66 IN | SYSTOLIC BLOOD PRESSURE: 147 MMHG

## 2024-08-01 DIAGNOSIS — K21.9 LARYNGOPHARYNGEAL REFLUX: ICD-10-CM

## 2024-08-01 DIAGNOSIS — K21.9 GASTROESOPHAGEAL REFLUX DISEASE, UNSPECIFIED WHETHER ESOPHAGITIS PRESENT: Primary | ICD-10-CM

## 2024-08-01 PROCEDURE — 1159F MED LIST DOCD IN RCRD: CPT | Performed by: INTERNAL MEDICINE

## 2024-08-01 PROCEDURE — 99214 OFFICE O/P EST MOD 30 MIN: CPT | Performed by: INTERNAL MEDICINE

## 2024-08-01 PROCEDURE — 1160F RVW MEDS BY RX/DR IN RCRD: CPT | Performed by: INTERNAL MEDICINE

## 2024-08-01 NOTE — PROGRESS NOTES
[facility] [place] - Outpatient Follow-up Note  Renay Herrera 73 y.o. female MRN: [id number]  Encounter: [id number]          ASSESSMENT AND PLAN:      1. Gastroesophageal reflux disease, unspecified whether esophagitis present  2. Laryngopharyngeal reflux  3. BMI 35.0-35.9,adult    History of chronic reflux, seems to be well-controlled on current regimen, positive EGD results noted, there was no significant hiatal hernia and/or esophagitis.  Reflux measures reviewed, emphasized smaller portions avoid late-night meals.  Will decrease the dose of acid reducers at this time to mitigate long-term use side effects.  Can start with stopping Pepcid discontinue pantoprazole 40 mg a day.  After couple months can try 20 mg a day to see if that would manage her symptoms.  PPI use is mainly to control her acid reflux symptoms.    She was also encouraged to lose some weight, long discussion with the patient and her  many questions answered..      ______________________________________________________________________    SUBJECTIVE:      Patient came in to discuss her history of heartburn acid reflux indigestion and occasional pressure in the upper abdomen, denies any dysphagia coughing choking spells nocturnal reflux regurgitation bronchitis pneumonias, appetite is fair weight stable, does not drink much coffee soda fried foods, trying to watch her diet and weight.  Denies any melena hematochezia.  Last colonoscopy 5 years ago according to patient at [facility] and she believes she is good for 10 years.  History of acid reflux in the past, couple EGDs done in the last few years with Dr. De Anda, she is taking pantoprazole 40 mg as well as Pepcid 20 mg a day.  Diet medications more than 10 systems reviewed.      REVIEW OF SYSTEMS IS OTHERWISE NEGATIVE.      Historical Information   Past Medical History:   Diagnosis Date   • Anxiety    • Disease of thyroid gland    • GERD (gastroesophageal reflux  "disease)    • H/O bilateral hip replacements    • Non Hodgkin's lymphoma (HCC)      Past Surgical History:   Procedure Laterality Date   • BACK SURGERY      lumbar spine   • FL RETROGRADE PYELOGRAM  4/11/2023   • HIP SURGERY Bilateral    • JOINT REPLACEMENT     • KNEE SURGERY Right    • [place] CYSTO/URETERO W/LITHOTRIPSY &INDWELL STENT INSRT Right 4/11/2023    Procedure: CYSTOSCOPY URETEROSCOPY WITH RETROGRADE PYELOGRAM AND INSERTION STENT URETERAL, STONE BASKET EXTRACTION;  Surgeon: Hung Lau MD;  Location: [facility];  Service: Urology   • TOTAL SHOULDER REPLACEMENT Right      Social History   Social History     Substance and Sexual Activity   Alcohol Use Not Currently    Comment: wine     Social History     Substance and Sexual Activity   Drug Use Never     Social History     Tobacco Use   Smoking Status Never   • Passive exposure: Never   Smokeless Tobacco Never     Family History   Problem Relation Age of Onset   • Lymphoma Mother    • No Known Problems Father    • No Known Problems Daughter    • No Known Problems Daughter    • No Known Problems Maternal Grandmother    • No Known Problems Maternal Grandfather    • No Known Problems Paternal Grandmother    • No Known Problems Paternal Grandfather    • No Known Problems Brother        Meds/Allergies       Current Outpatient Medications:   •  amoxicillin (AMOXIL) 500 MG tablet  •  CALCIUM PO  •  dicyclomine (BENTYL) 10 mg capsule  •  divalproex sodium (DEPAKOTE ER) 500 mg 24 hr tablet  •  famotidine (PEPCID) 20 mg tablet  •  levothyroxine 75 mcg tablet  •  multivitamin (THERAGRAN) TABS  •  pantoprazole (PROTONIX) 40 mg tablet  •  traZODone (DESYREL) 50 mg tablet  •  venlafaxine (EFFEXOR-XR) 150 mg 24 hr capsule  •  cyclobenzaprine (FLEXERIL) 5 mg tablet    Allergies   Allergen Reactions   • Clindamycin Hives           Objective     Blood pressure 147/85, pulse 72, height 5' 5.5\" (1.664 m), weight 97.1 kg (214 lb). Body mass index is 35.07 kg/m².      PHYSICAL " EXAM:      General Appearance:   Alert, cooperative, no distress   HEENT:   Normocephalic, atraumatic, anicteric.     Neck:  Supple, symmetrical, trachea midline   Lungs:   Clear to auscultation bilaterally; no rales, rhonchi or wheezing; respirations unlabored    Heart::   Regular rate and rhythm; no murmur.   Abdomen:   Soft, non-tender, non-distended; normal bowel sounds; no masses, no organomegaly    Genitalia:   Deferred    Rectal:   Deferred    Extremities:  No cyanosis, clubbing or edema    Skin:  No jaundice, rashes, or lesions    Lymph nodes:  No palpable cervical lymphadenopathy        Lab Results:   No visits with results within 1 Day(s) from this visit.   Latest known visit with results is:   Office Visit on 03/26/2024   Component Date Value   • POCT SARS-CoV-2 Ag 03/26/2024 Negative    • VALID CONTROL 03/26/2024 Valid    • RAPID FLU A 03/26/2024 neg    • RAPID FLU B 03/26/2024 neg          Radiology Results:   XR spine cervical complete 4 or 5 vw non injury    Result Date: 7/10/2024  Narrative: CERVICAL SPINE INDICATION:   Cervicalgia. Other muscle spasm. COMPARISON: None. VIEWS:  XR SPINE CERVICAL COMPLETE 4 OR 5 VW NON INJURY Images: 5 FINDINGS: No fracture. Straightening of the cervical lordosis may be related to patient positioning or muscle spasm. There is grade 1 anterolisthesis at C2 with respect to C3. There is intervertebral disc space narrowing at every level with the exception of C2-C3. Degenerative changes are seen throughout the cervical spine with osteophytosis. The left neuroforamina at the level of C4 is stenosed. The prevertebral soft tissues are within normal limits.  The lung apices are clear.     Impression: No acute osseous abnormality. Grade 1 anterolisthesis at C2-C3. Degenerative changes as above. Electronically signed: 07/10/2024 12:14 AM Taylor Villarreal MD  Answers submitted by the patient for this visit:  Abdominal Pain Questionnaire (Submitted on 7/25/2024)  Chief Complaint:  Abdominal pain  Chronicity: recurrent  Onset: more than 1 year ago  Onset quality: gradual  Frequency: 2 to 4 times per day  Episode duration: 1 Hours  Progression since onset: unchanged  Pain location: epigastric region  Pain - numeric: 6/10  Pain quality: burning, dull  Radiates to: epigastric region  frequency: Yes  Aggravated by: drinking alcohol, eating  Relieved by: nothing

## 2024-08-20 NOTE — ASSESSMENT & PLAN NOTE
Patient is stable  and will continue present plan of care and reassess at next routine visit  All questions about this problem from patient were answered today  Patient arrives to room #6, AOx4, wife at bedside. Hx of HTN, DM, HLD, ETOH use. Presents to ED c/o nausea, diarrhea x 3 days. pt also c/o gradually worsening abdominal distention x 2 months, and circular discolored skin rash on b/l UE and LE x 2 months. Pt's abdomen appear distended. Pt denies abdominal pain at this time. Reports >20 episodes/per day of diarrhea. Denies blood in stool. Reports recently being on antibiotics x 1 weeks for GI infection. Pt reports daily alcohol use, states he drinks '1 small box of wine/ per day'. Pt reports he last drink was on saturday. CIWA protocol in place. scoring 7 on CIWA scale at this time. tremor of UE w/ extension noted, c/o mild 3/10 HA ans nausea at this time. denies auditory visual or tactile hallucinations. Maintained on cardiac monitor and cont pulse ox, Sinusn tachy on monitor, Denies chest pain, palpitations, SOB, vision changes, vomiting, fever, chills, cough, dizziness, numbness, tingling, dysuria. Breathing even and unlabored on room air, no signs of dyspnea or respiratory distress. Skin warm, dry, intact, appropriate for race, No signs of cyanosis/pallor noted. 20G IV placed in right ac. Labs drawn and sent. Vital signs as charted. Safety maintained, stretcher locked in lowest position with siderails up x2, call bell and personal items within reach. Medication given and tolerated well per EMAR. Patient pending chest xray

## 2024-08-23 DIAGNOSIS — K21.9 GASTROESOPHAGEAL REFLUX DISEASE WITHOUT ESOPHAGITIS: ICD-10-CM

## 2024-08-23 RX ORDER — PANTOPRAZOLE SODIUM 40 MG/1
40 TABLET, DELAYED RELEASE ORAL
Qty: 90 TABLET | Refills: 1 | Status: SHIPPED | OUTPATIENT
Start: 2024-08-23

## 2024-08-29 ENCOUNTER — RA CDI HCC (OUTPATIENT)
Dept: OTHER | Facility: HOSPITAL | Age: 74
End: 2024-08-29

## 2024-08-30 ENCOUNTER — APPOINTMENT (OUTPATIENT)
Dept: LAB | Facility: MEDICAL CENTER | Age: 74
End: 2024-08-30
Payer: COMMERCIAL

## 2024-08-30 DIAGNOSIS — E03.4 HYPOTHYROIDISM DUE TO ACQUIRED ATROPHY OF THYROID: ICD-10-CM

## 2024-08-30 LAB — TSH SERPL DL<=0.05 MIU/L-ACNC: 2.21 UIU/ML (ref 0.45–4.5)

## 2024-08-30 PROCEDURE — 36415 COLL VENOUS BLD VENIPUNCTURE: CPT

## 2024-08-30 PROCEDURE — 84443 ASSAY THYROID STIM HORMONE: CPT

## 2024-09-03 NOTE — PROGRESS NOTES
Ambulatory Visit  Name: Renay Herrera      : 1950      MRN: [id number]  Encounter Provider: Jamison May MD  Encounter Date: 2024   Encounter department: [facility]    Assessment & Plan   1. Small B-cell lymphoma, unspecified body region (HCC)  Assessment & Plan:  Patient is stable  and will continue present plan of care and reassess at next routine visit. All questions about this problem from patient were answered today.   2. Hypothyroidism due to acquired atrophy of thyroid  Assessment & Plan:  Patient to continue current dose of thyroid medicine and recheck TSH in 6 months   Orders:  -     TSH, 3rd generation with Free T4 reflex; Future  3. Gastroesophageal reflux disease, unspecified whether esophagitis present  Assessment & Plan:  Patient to continue with present therapy and decrease caffeine, avoid ETOH and smoking to decrease acid production. Pt should also cease eating prior to bedtime and avoid excessive fluid intake prior to sleep. May use antacids as needed for breakthrough GERD. All pateint questions answered today about this condition.   4. Current moderate episode of major depressive disorder without prior episode (HCC)  Assessment & Plan:  Patient to continue utilizing medical therapy as well and counseling sources as applicable for condition. If  suicidal thought or fear of suicide to contact 911 and seek help immediately. Meds reviewed and patient questions answered today   5. Class 2 obesity without serious comorbidity in adult, unspecified BMI, unspecified obesity type  Assessment & Plan:  Patient to increase exercise and partake of a diet with less calories to promote  weight loss   6. Anxiety  Assessment & Plan:  Patient to continue utilizing medical therapy as well as counseling sources as applicable to condition. If suicidal thought or fear of suicide attempt, to call 911 and seek help immediately. Medications and therapy reviewed today and all  patient  questions answered today.   7. Other fatigue  -     Comprehensive metabolic panel; Future  -     CBC and differential; Future  -     Magnesium; Future  -     Uric acid; Future  -     UA/M w/rflx Culture, Comp  8. Screening cholesterol level  -     Lipid Panel with Direct LDL reflex; Future      Depression Screening and Follow-up Plan: Patient with underlying depression and was advised to continue current medications as prescribed. Patient advised to follow-up with PCP for further management.     Falls Plan of Care: balance, strength, and gait training instructions were provided. Home safety education provided.     Urinary Incontinence Plan of Care: counseling topics discussed: practice Kegel (pelvic floor strengthening) exercises, use restroom every 2 hours, limit alcohol, caffeine, spicy foods, and acidic foods, limiting fluid intake 3-4 hours before bed, weight loss, preventing constipation and limiting fluid intake to 60 oz. per day.       History of Present Illness     74-year-old female here today for checkup on multimedical problems patient hypothyroidism depression anxiety GERD as well as low back pain.  Patient complaining today about having a low back pain that is made worse when she gets up from sleep and also gets worse after she sits for a while.  Patient states that she when she sits down her back will feel well but after a while her back pain will start to increase with inactivity and sitting.  Patient also states that when she goes shopping when she stands for a while she will also have back pain.  I have some concerns the patient may have some signs and symptoms of spinal stenosis that gets better with activity.  Will see about getting an x-ray of her lumbar spine with views with bending I will give her some naproxen and we will see about having her see somebody at the spine center for further evaluation.  Patient also is interested in some weight loss and is okay with taking some Topamax 50  mg twice a day.  We will check some lab work to recheck her sodium and we will check her back for a weigh-in in 6 weeks.      Review of Systems   Constitutional:  Negative for activity change, appetite change, fatigue and fever.   HENT:  Negative for congestion, ear pain, postnasal drip, rhinorrhea, sinus pressure, sinus pain, sneezing and sore throat.    Eyes:  Negative for pain and redness.   Respiratory:  Negative for apnea, cough, chest tightness, shortness of breath and wheezing.    Cardiovascular:  Negative for chest pain, palpitations and leg swelling.   Gastrointestinal:  Negative for abdominal pain, constipation, diarrhea, nausea and vomiting.   Endocrine: Negative for cold intolerance and heat intolerance.   Genitourinary:  Negative for difficulty urinating, dysuria, frequency, hematuria and urgency.   Musculoskeletal:  Positive for back pain. Negative for arthralgias, gait problem and myalgias.   Skin:  Negative for rash.   Neurological:  Negative for dizziness, speech difficulty, weakness, numbness and headaches.   Hematological:  Does not bruise/bleed easily.   Psychiatric/Behavioral:  Negative for agitation, confusion and hallucinations.      Past Medical History:   Diagnosis Date    Allergic     Anxiety     Cancer (HCC) Cant remember    Depression 20 years ago    Disease of thyroid gland     GERD (gastroesophageal reflux disease)     H/O bilateral hip replacements     Kidney stone April 2023    Non Hodgkin's lymphoma (HCC)      Past Surgical History:   Procedure Laterality Date    BACK SURGERY      lumbar spine    FL RETROGRADE PYELOGRAM  04/11/2023    HIP SURGERY Bilateral     JOINT REPLACEMENT      KNEE SURGERY Right     [place] CYSTO/URETERO W/LITHOTRIPSY &INDWELL STENT INSRT Right 04/11/2023    Procedure: CYSTOSCOPY URETEROSCOPY WITH RETROGRADE PYELOGRAM AND INSERTION STENT URETERAL, STONE BASKET EXTRACTION;  Surgeon: Hung Lau MD;  Location[facility];  Service: Urology    TOTAL SHOULDER  REPLACEMENT Right     TUBAL LIGATION       Family History   Problem Relation Age of Onset    Lymphoma Mother     Cancer Mother     No Known Problems Father     No Known Problems Daughter     No Known Problems Daughter     No Known Problems Maternal Grandmother     No Known Problems Maternal Grandfather     No Known Problems Paternal Grandmother     No Known Problems Paternal Grandfather     No Known Problems Brother      Social History     Tobacco Use    Smoking status: Never     Passive exposure: Never    Smokeless tobacco: Never   Vaping Use    Vaping status: Never Used   Substance and Sexual Activity    Alcohol use: Not Currently     Comment: wine    Drug use: Never    Sexual activity: Not Currently     Partners: Male     Birth control/protection: Female Sterilization     Current Outpatient Medications on File Prior to Visit   Medication Sig    CALCIUM PO Take by mouth    dicyclomine (BENTYL) 10 mg capsule Take 1 capsule (10 mg total) by mouth daily    levothyroxine 75 mcg tablet TAKE ONE TABLET BY MOUTH IN THE EARLY MORNING    multivitamin (THERAGRAN) TABS Take 1 tablet by mouth daily    pantoprazole (PROTONIX) 40 mg tablet Take 1 tablet by mouth daily before breakfast    traZODone (DESYREL) 50 mg tablet Take 1 tablet (50 mg total) by mouth daily at bedtime as needed for sleep    venlafaxine (EFFEXOR-XR) 150 mg 24 hr capsule TAKE ONE CAPSULE BY MOUTH EVERY DAY    divalproex sodium (DEPAKOTE ER) 500 mg 24 hr tablet Take 1 tablet by mouth daily    famotidine (PEPCID) 20 mg tablet Take 1 tablet (20 mg total) by mouth 2 (two) times a day    [DISCONTINUED] amoxicillin (AMOXIL) 500 MG tablet  (Patient not taking: Reported on 9/4/2024)    [DISCONTINUED] cyclobenzaprine (FLEXERIL) 5 mg tablet Take 1 tablet (5 mg total) by mouth 2 (two) times a day as needed for muscle spasms     Allergies   Allergen Reactions    Clindamycin Hives     Immunization History   Administered Date(s) Administered    COVID-19 MODERNA VACC 0.5  "ML IM 01/28/2021, 03/05/2021, 11/10/2021    COVID-19 Moderna Vac BIVALENT 12 Yr+ IM 0.5 ML 11/04/2022    COVID-19 Moderna mRNA Vaccine 12 Yr+ 50 mcg/0.5 mL (Spikevax) 12/01/2023    H1N1 Inj 02/16/2010    INFLUENZA 10/20/2022    Influenza Quadrivalent Preservative Free 3 years and older IM 11/18/2015, 10/12/2016, 10/13/2017, 11/08/2018    Influenza, Seasonal Vaccine, Quadrivalent, Adjuvanted, .5e 10/10/2023    Influenza, high dose seasonal 0.7 mL 10/24/2021, 10/20/2022    Influenza, seasonal, injectable 10/24/2006, 10/26/2007, 11/12/2008, 10/15/2009, 10/12/2010, 11/22/2011, 12/13/2012, 11/14/2013, 10/22/2014    Pneumococcal Conjugate 13-Valent 11/18/2015    Pneumococcal Polysaccharide PPV23 01/01/2004, 11/18/2014    Respiratory Syncytial Virus Vaccine (Recombinant, Adjuvanted) 12/01/2023    TD (adult) Preservative Free 12/07/2017    Tdap 07/27/2007, 10/21/2019    Zoster Vaccine Recombinant 12/04/2018, 10/28/2019, 02/04/2020    influenza, trivalent, adjuvanted 10/21/2019     Objective     /86 (BP Location: Left arm, Patient Position: Sitting, Cuff Size: Standard)   Pulse 70   Temp (!) 97.2 °F (36.2 °C) (Skin)   Ht 5' 5.5\" (1.664 m)   Wt 98 kg (216 lb)   SpO2 97%   BMI 35.40 kg/m²     Physical Exam  Constitutional:       Appearance: Normal appearance. She is not ill-appearing.   HENT:      Head: Normocephalic and atraumatic.      Right Ear: Tympanic membrane normal.      Left Ear: Tympanic membrane normal.      Nose: Nose normal.      Mouth/Throat:      Mouth: Mucous membranes are moist.   Eyes:      Extraocular Movements: Extraocular movements intact.      Conjunctiva/sclera: Conjunctivae normal.      Pupils: Pupils are equal, round, and reactive to light.   Cardiovascular:      Rate and Rhythm: Normal rate and regular rhythm.   Pulmonary:      Effort: Pulmonary effort is normal. No respiratory distress.      Breath sounds: Normal breath sounds. No wheezing.   Abdominal:      General: Bowel sounds are " normal.      Palpations: Abdomen is soft.      Tenderness: There is no abdominal tenderness.   Musculoskeletal:         General: Tenderness present. Normal range of motion.      Cervical back: Normal range of motion and neck supple.      Right lower leg: No edema.      Left lower leg: No edema.   Skin:     General: Skin is warm and dry.   Neurological:      Mental Status: She is alert and oriented to person, place, and time.   Psychiatric:         Mood and Affect: Mood normal.         Behavior: Behavior normal.         Thought Content: Thought content normal.         Judgment: Judgment normal.

## 2024-09-04 ENCOUNTER — OFFICE VISIT (OUTPATIENT)
Dept: FAMILY MEDICINE CLINIC | Facility: CLINIC | Age: 74
End: 2024-09-04
Payer: COMMERCIAL

## 2024-09-04 VITALS
SYSTOLIC BLOOD PRESSURE: 134 MMHG | TEMPERATURE: 97.2 F | WEIGHT: 216 LBS | HEART RATE: 70 BPM | DIASTOLIC BLOOD PRESSURE: 86 MMHG | OXYGEN SATURATION: 97 % | HEIGHT: 66 IN | BODY MASS INDEX: 34.72 KG/M2

## 2024-09-04 DIAGNOSIS — F41.9 ANXIETY: ICD-10-CM

## 2024-09-04 DIAGNOSIS — C83.00 SMALL B-CELL LYMPHOMA, UNSPECIFIED BODY REGION (HCC): Primary | ICD-10-CM

## 2024-09-04 DIAGNOSIS — Z13.220 SCREENING CHOLESTEROL LEVEL: ICD-10-CM

## 2024-09-04 DIAGNOSIS — K21.9 GASTROESOPHAGEAL REFLUX DISEASE, UNSPECIFIED WHETHER ESOPHAGITIS PRESENT: ICD-10-CM

## 2024-09-04 DIAGNOSIS — F32.1 CURRENT MODERATE EPISODE OF MAJOR DEPRESSIVE DISORDER WITHOUT PRIOR EPISODE (HCC): ICD-10-CM

## 2024-09-04 DIAGNOSIS — E03.4 HYPOTHYROIDISM DUE TO ACQUIRED ATROPHY OF THYROID: ICD-10-CM

## 2024-09-04 DIAGNOSIS — R53.83 OTHER FATIGUE: ICD-10-CM

## 2024-09-04 DIAGNOSIS — E66.9 CLASS 2 OBESITY WITHOUT SERIOUS COMORBIDITY IN ADULT, UNSPECIFIED BMI, UNSPECIFIED OBESITY TYPE: ICD-10-CM

## 2024-09-04 DIAGNOSIS — E66.09 CLASS 2 OBESITY DUE TO EXCESS CALORIES WITHOUT SERIOUS COMORBIDITY WITH BODY MASS INDEX (BMI) OF 35.0 TO 35.9 IN ADULT: ICD-10-CM

## 2024-09-04 DIAGNOSIS — M48.061 SPINAL STENOSIS OF LUMBAR REGION WITHOUT NEUROGENIC CLAUDICATION: ICD-10-CM

## 2024-09-04 PROCEDURE — 1160F RVW MEDS BY RX/DR IN RCRD: CPT | Performed by: FAMILY MEDICINE

## 2024-09-04 PROCEDURE — 3725F SCREEN DEPRESSION PERFORMED: CPT | Performed by: FAMILY MEDICINE

## 2024-09-04 PROCEDURE — 99214 OFFICE O/P EST MOD 30 MIN: CPT | Performed by: FAMILY MEDICINE

## 2024-09-04 PROCEDURE — G2211 COMPLEX E/M VISIT ADD ON: HCPCS | Performed by: FAMILY MEDICINE

## 2024-09-04 PROCEDURE — 1159F MED LIST DOCD IN RCRD: CPT | Performed by: FAMILY MEDICINE

## 2024-09-04 RX ORDER — TOPIRAMATE 50 MG/1
50 TABLET, FILM COATED ORAL 2 TIMES DAILY
Qty: 60 TABLET | Refills: 1 | Status: SHIPPED | OUTPATIENT
Start: 2024-09-04

## 2024-09-04 RX ORDER — NAPROXEN 500 MG/1
500 TABLET ORAL 2 TIMES DAILY WITH MEALS
Qty: 60 TABLET | Refills: 1 | Status: SHIPPED | OUTPATIENT
Start: 2024-09-04

## 2024-09-16 ENCOUNTER — TELEPHONE (OUTPATIENT)
Dept: FAMILY MEDICINE CLINIC | Facility: CLINIC | Age: 74
End: 2024-09-16

## 2024-09-16 ENCOUNTER — APPOINTMENT (OUTPATIENT)
Dept: RADIOLOGY | Facility: MEDICAL CENTER | Age: 74
End: 2024-09-16
Payer: COMMERCIAL

## 2024-09-16 DIAGNOSIS — M48.061 SPINAL STENOSIS OF LUMBAR REGION WITHOUT NEUROGENIC CLAUDICATION: ICD-10-CM

## 2024-09-16 PROCEDURE — 72114 X-RAY EXAM L-S SPINE BENDING: CPT

## 2024-09-17 NOTE — TELEPHONE ENCOUNTER
Call pt. X ray of spine shows old fracture of spine and old surgery that was done in past. To keep visit with spine specialist to further evaluate.

## 2024-09-24 DIAGNOSIS — K21.9 GASTROESOPHAGEAL REFLUX DISEASE, UNSPECIFIED WHETHER ESOPHAGITIS PRESENT: ICD-10-CM

## 2024-09-24 DIAGNOSIS — R10.13 EPIGASTRIC PAIN: ICD-10-CM

## 2024-09-24 RX ORDER — DICYCLOMINE HYDROCHLORIDE 10 MG/1
10 CAPSULE ORAL DAILY
Qty: 90 CAPSULE | Refills: 1 | Status: SHIPPED | OUTPATIENT
Start: 2024-09-24

## 2024-09-26 DIAGNOSIS — E66.812 CLASS 2 OBESITY DUE TO EXCESS CALORIES WITHOUT SERIOUS COMORBIDITY WITH BODY MASS INDEX (BMI) OF 35.0 TO 35.9 IN ADULT: ICD-10-CM

## 2024-09-26 DIAGNOSIS — E66.09 CLASS 2 OBESITY DUE TO EXCESS CALORIES WITHOUT SERIOUS COMORBIDITY WITH BODY MASS INDEX (BMI) OF 35.0 TO 35.9 IN ADULT: ICD-10-CM

## 2024-09-26 RX ORDER — TOPIRAMATE 50 MG/1
50 TABLET, FILM COATED ORAL 2 TIMES DAILY
Qty: 180 TABLET | Refills: 1 | Status: SHIPPED | OUTPATIENT
Start: 2024-09-26

## 2024-10-01 ENCOUNTER — APPOINTMENT (OUTPATIENT)
Dept: LAB | Facility: MEDICAL CENTER | Age: 74
End: 2024-10-01
Payer: COMMERCIAL

## 2024-10-01 ENCOUNTER — TELEPHONE (OUTPATIENT)
Dept: GASTROENTEROLOGY | Facility: AMBULARY SURGERY CENTER | Age: 74
End: 2024-10-01

## 2024-10-01 ENCOUNTER — CONSULT (OUTPATIENT)
Dept: PAIN MEDICINE | Facility: CLINIC | Age: 74
End: 2024-10-01
Payer: COMMERCIAL

## 2024-10-01 VITALS
DIASTOLIC BLOOD PRESSURE: 82 MMHG | SYSTOLIC BLOOD PRESSURE: 134 MMHG | HEIGHT: 65 IN | HEART RATE: 61 BPM | WEIGHT: 206 LBS | BODY MASS INDEX: 34.32 KG/M2

## 2024-10-01 DIAGNOSIS — Z98.1 STATUS POST LUMBAR SPINAL FUSION: ICD-10-CM

## 2024-10-01 DIAGNOSIS — M47.816 LUMBAR SPONDYLOSIS: Primary | ICD-10-CM

## 2024-10-01 DIAGNOSIS — Z13.220 SCREENING CHOLESTEROL LEVEL: ICD-10-CM

## 2024-10-01 DIAGNOSIS — Z12.11 COLON CANCER SCREENING: Primary | ICD-10-CM

## 2024-10-01 DIAGNOSIS — E03.4 HYPOTHYROIDISM DUE TO ACQUIRED ATROPHY OF THYROID: ICD-10-CM

## 2024-10-01 DIAGNOSIS — R53.83 OTHER FATIGUE: ICD-10-CM

## 2024-10-01 DIAGNOSIS — M48.061 SPINAL STENOSIS OF LUMBAR REGION WITHOUT NEUROGENIC CLAUDICATION: ICD-10-CM

## 2024-10-01 LAB
ALBUMIN SERPL BCG-MCNC: 4 G/DL (ref 3.5–5)
ALP SERPL-CCNC: 51 U/L (ref 34–104)
ALT SERPL W P-5'-P-CCNC: 19 U/L (ref 7–52)
ANION GAP SERPL CALCULATED.3IONS-SCNC: 7 MMOL/L (ref 4–13)
AST SERPL W P-5'-P-CCNC: 20 U/L (ref 13–39)
BILIRUB SERPL-MCNC: 0.34 MG/DL (ref 0.2–1)
BUN SERPL-MCNC: 24 MG/DL (ref 5–25)
CALCIUM SERPL-MCNC: 9.4 MG/DL (ref 8.4–10.2)
CHLORIDE SERPL-SCNC: 107 MMOL/L (ref 96–108)
CO2 SERPL-SCNC: 25 MMOL/L (ref 21–32)
CREAT SERPL-MCNC: 0.7 MG/DL (ref 0.6–1.3)
GFR SERPL CREATININE-BSD FRML MDRD: 85 ML/MIN/1.73SQ M
GLUCOSE SERPL-MCNC: 85 MG/DL (ref 65–140)
POTASSIUM SERPL-SCNC: 4.6 MMOL/L (ref 3.5–5.3)
PROT SERPL-MCNC: 6.3 G/DL (ref 6.4–8.4)
SODIUM SERPL-SCNC: 139 MMOL/L (ref 135–147)

## 2024-10-01 PROCEDURE — 99204 OFFICE O/P NEW MOD 45 MIN: CPT | Performed by: PAIN MEDICINE

## 2024-10-01 PROCEDURE — 80053 COMPREHEN METABOLIC PANEL: CPT

## 2024-10-01 PROCEDURE — 36415 COLL VENOUS BLD VENIPUNCTURE: CPT

## 2024-10-01 NOTE — TELEPHONE ENCOUNTER
CIARA Hall ordered cologuard kit.  I called and spoke to pt and informed would be mailed to her house and to please do end of Nov beginning of Dec.  She will do so.

## 2024-10-01 NOTE — TELEPHONE ENCOUNTER
Recall in system from Dr Loredo for pt to have a cologuard in 12/2024.  Can order please be created for this and I will then call pt to notify? Thank you!

## 2024-10-01 NOTE — PROGRESS NOTES
Assessment  1. Lumbar spondylosis  2. Spinal stenosis of lumbar region without neurogenic claudication  -     Ambulatory referral to Spine & Pain Management  3. Status post lumbar spinal fusion        74 female status post lumbar fusion L4-5 4 years prior [facility] with persistent low back pain ongoing for 1 year axial in nature she has tenderness palpation lumbar facets at L5-S1 as well as tenderness palpation of the sacroiliac joints.  I will obtain an MRI lumbar spine with and without contrast will obtain CMP to evaluate her kidney function prior to getting the MRI.            My impressions and treatment recommendations were discussed in detail with the patient who verbalized understanding and had no further questions.  Discharge instructions were provided. I personally saw and examined the patient and I agree with the above discussed plan of care.    Plan      No orders of the defined types were placed in this encounter.      No orders of the defined types were placed in this encounter.      History of Present Illness    Renay Herrera is a 74 y.o. female with relevant PMH of acid reflux thrombocytopenia history of L4-5 fusion with symptoms ongoing for the past 1 year moderate-severe intensity in terms of pain pain is constant affecting her symptoms all the time she feels a 30 to 60% of the time pressure like pain always hurting with activities that radiate to the lower extremities with weakness she is on therapy 6 weeks last 6 months no bowel bladder incontinence she is not on blood thinners no allergies to contrast dye.      I have personally reviewed and/or updated the patient's past medical history, past surgical history, family history, social history, current medications, allergies, and vital signs today.     Review of Systems   Musculoskeletal:  Positive for arthralgias, joint swelling and myalgias.   All other systems reviewed and are negative.      Patient Active Problem List   Diagnosis     Hypothyroidism    Current moderate episode of major depressive disorder without prior episode (HCC)    GERD (gastroesophageal reflux disease)    History of hip joint replacement by other means    History of total knee arthroplasty, right    Laryngopharyngeal reflux    History of pulmonary embolism    History of lumbosacral spine surgery    Bipolar affective disorder, currently depressed, mild (HCC)    Class 2 obesity without serious comorbidity in adult    Small B-cell lymphoma (HCC)    Sacroiliitis, not elsewhere classified (HCC)    Thrombocytopenia (HCC)    High ankle sprain of left lower extremity    Right ureteral stone with hydronephrosis    Hydronephrosis of right kidney    Anxiety       Past Medical History:   Diagnosis Date    Allergic     Anxiety     Cancer (HCC) Cant remember    Depression 20 years ago    Disease of thyroid gland     GERD (gastroesophageal reflux disease)     H/O bilateral hip replacements     Kidney stone April 2023    Non Hodgkin's lymphoma (HCC)        Past Surgical History:   Procedure Laterality Date    BACK SURGERY      lumbar spine    [place] RETROGRADE PYELOGRAM  04/11/2023    HIP SURGERY Bilateral     JOINT REPLACEMENT      KNEE SURGERY Right     [place] CYSTO/URETERO W/LITHOTRIPSY &INDWELL STENT INSRT Right 04/11/2023    Procedure: CYSTOSCOPY URETEROSCOPY WITH RETROGRADE PYELOGRAM AND INSERTION STENT URETERAL, STONE BASKET EXTRACTION;  Surgeon: Hung Lau MD;  Location: [facility];  Service: Urology    TOTAL SHOULDER REPLACEMENT Right     TUBAL LIGATION         Family History   Problem Relation Age of Onset    Lymphoma Mother     Cancer Mother     No Known Problems Father     No Known Problems Daughter     No Known Problems Daughter     No Known Problems Maternal Grandmother     No Known Problems Maternal Grandfather     No Known Problems Paternal Grandmother     No Known Problems Paternal Grandfather     No Known Problems Brother        Social History     Occupational History    Not  "on file   Tobacco Use    Smoking status: Never     Passive exposure: Never    Smokeless tobacco: Never   Vaping Use    Vaping status: Never Used   Substance and Sexual Activity    Alcohol use: Not Currently     Comment: wine    Drug use: Never    Sexual activity: Not Currently     Partners: Male     Birth control/protection: Female Sterilization       Current Outpatient Medications on File Prior to Visit   Medication Sig    CALCIUM PO Take by mouth    dicyclomine (BENTYL) 10 mg capsule Take 1 capsule  by mouth daily    divalproex sodium (DEPAKOTE ER) 500 mg 24 hr tablet Take 1 tablet by mouth daily    famotidine (PEPCID) 20 mg tablet Take 1 tablet (20 mg total) by mouth 2 (two) times a day    levothyroxine 75 mcg tablet TAKE ONE TABLET BY MOUTH IN THE EARLY MORNING    multivitamin (THERAGRAN) TABS Take 1 tablet by mouth daily    naproxen (Naprosyn) 500 mg tablet Take 1 tablet (500 mg total) by mouth 2 (two) times a day with meals    pantoprazole (PROTONIX) 40 mg tablet Take 1 tablet by mouth daily before breakfast    topiramate (TOPAMAX) 50 MG tablet TAKE 1 TABLET BY MOUTH TWICE A DAY    traZODone (DESYREL) 50 mg tablet Take 1 tablet (50 mg total) by mouth daily at bedtime as needed for sleep    venlafaxine (EFFEXOR-XR) 150 mg 24 hr capsule TAKE ONE CAPSULE BY MOUTH EVERY DAY     No current facility-administered medications on file prior to visit.       Allergies   Allergen Reactions    Clindamycin Hives         Physical Exam    /82   Pulse 61   Ht 5' 5.25\" (1.657 m)   Wt 93.4 kg (206 lb)   BMI 34.02 kg/m²           MSK:    Lumbar Spine:  No masses or atrophy,    Range of motion - Decreased extension/flexion  Palpation -  Tenderness to palpation in the lumbar parapsinals   PSIS tenderness + bilaterally  Vel's/FEDE + ibilatearlly  Gaenslen's + bilaterally  SLR neg       Strength Right Left   Hip flexion L1,2 5 5   Knee extension L3 5 5   Ankle dorsiflexion L4 5 5   Great toe extension L5 5 5   Ankle " Plantarflexion S1 5 5       Sensory Exam:  intact to light touch bilateral LE       Reflexes:     Right Left   Biceps 2+ 2+   Triceps 2+ 2+   Brachioradialis 2+ 2+   Patellar 2+ 2+   Achilles 2+ 2+   Babinski neg neg        Gait normal                  Imaging  X-ray lumbar spine 2024 September  L1 compression fracture  L4-5 instrumented fusion with adjacent segment degeneration at L5-S1

## 2024-10-02 ENCOUNTER — TELEPHONE (OUTPATIENT)
Dept: PAIN MEDICINE | Facility: CLINIC | Age: 74
End: 2024-10-02

## 2024-10-02 DIAGNOSIS — M54.16 LUMBAR RADICULOPATHY: ICD-10-CM

## 2024-10-02 DIAGNOSIS — M51.26 LUMBAR DISC HERNIATION: Primary | ICD-10-CM

## 2024-10-02 NOTE — TELEPHONE ENCOUNTER
----- Message from Fernando Luther MD sent at 10/2/2024  9:08 AM EDT -----  Kidney function is good to proceed with MRI lumbar spine with and wihtout contrast, order was placed

## 2024-10-02 NOTE — TELEPHONE ENCOUNTER
Caller: diane Niño    Doctor: Tram    Reason for call: pt returned nurse call and was given the below message.  Pt has no other clinical questions and does not need a phone call back at this time    Pt was then transferred to central scheduling to schedule her MRI    Call back#: [phone]    ----- Message from Fernando Luther MD sent at 10/2/2024  9:08 AM EDT -----  Kidney function is good to proceed with MRI lumbar spine with and wihtout contrast, order was placed

## 2024-10-07 DIAGNOSIS — F33.41 RECURRENT MAJOR DEPRESSIVE DISORDER, IN PARTIAL REMISSION (HCC): ICD-10-CM

## 2024-10-08 RX ORDER — TRAZODONE HYDROCHLORIDE 50 MG/1
50 TABLET, FILM COATED ORAL
Qty: 90 TABLET | Refills: 1 | Status: SHIPPED | OUTPATIENT
Start: 2024-10-08

## 2024-10-23 ENCOUNTER — HOSPITAL ENCOUNTER (OUTPATIENT)
Dept: MRI IMAGING | Facility: HOSPITAL | Age: 74
Discharge: HOME | End: 2024-10-23
Attending: PAIN MEDICINE
Payer: COMMERCIAL

## 2024-10-23 DIAGNOSIS — M54.16 LUMBAR RADICULOPATHY: ICD-10-CM

## 2024-10-23 DIAGNOSIS — M51.26 LUMBAR DISC HERNIATION: ICD-10-CM

## 2024-10-23 PROCEDURE — 72158 MRI LUMBAR SPINE W/O & W/DYE: CPT

## 2024-10-23 PROCEDURE — A9585 GADOBUTROL INJECTION: HCPCS | Performed by: PAIN MEDICINE

## 2024-10-23 RX ORDER — GADOBUTROL 604.72 MG/ML
9 INJECTION INTRAVENOUS
Status: COMPLETED | OUTPATIENT
Start: 2024-10-23 | End: 2024-10-23

## 2024-10-23 RX ADMIN — GADOBUTROL 9 ML: 604.72 INJECTION INTRAVENOUS at 07:07

## 2024-10-25 ENCOUNTER — TELEPHONE (OUTPATIENT)
Dept: PAIN MEDICINE | Facility: CLINIC | Age: 74
End: 2024-10-25

## 2024-10-25 NOTE — TELEPHONE ENCOUNTER
----- Message from Fernando Luther MD sent at 10/24/2024  8:01 PM EDT -----  Developing moderate stenosis above fusion level likely source of pain , lets discuss shanon in the office next stpes

## 2024-10-27 NOTE — PROGRESS NOTES
Ambulatory Visit  Name: Renay Herrera      : 1950      MRN: [id number]  Encounter Provider: Jamison May MD  Encounter Date: 10/28/2024   Encounter department: [facility]    Assessment & Plan  Class 2 obesity without serious comorbidity in adult, unspecified BMI, unspecified obesity type  Patient to increase exercise and partake of a diet with less calories to promote  weight loss          Current moderate episode of major depressive disorder without prior episode (HCC)  Patient to continue utilizing medical therapy as well and counseling sources as applicable for condition. If  suicidal thought or fear of suicide to contact 911 and seek help immediately. Meds reviewed and patient questions answered today          Gastroesophageal reflux disease, unspecified whether esophagitis present  Patient to continue with present therapy and decrease caffeine, avoid ETOH and smoking to decrease acid production. Pt should also cease eating prior to bedtime and avoid excessive fluid intake prior to sleep. May use antacids as needed for breakthrough GERD. All pateint questions answered today about this condition.          Hypothyroidism due to acquired atrophy of thyroid  Patient to continue current dose of thyroid medicine and recheck TSH in 6 months          Menopause    Orders:    DXA bone density spine hip and pelvis; Future      Depression Screening and Follow-up Plan: Patient with underlying depression and was advised to continue current medications as prescribed. Patient advised to follow-up with PCP for further management.     Falls Plan of Care: balance, strength, and gait training instructions were provided. Home safety education provided.     Urinary Incontinence Plan of Care: counseling topics discussed: practice Kegel (pelvic floor strengthening) exercises, use restroom every 2 hours, limit alcohol, caffeine, spicy foods, and acidic foods, limiting fluid intake 3-4 hours  before bed, weight loss, preventing constipation and limiting fluid intake to 60 oz. per day.     History of Present Illness     4-year-old female today for checkup of multimedical problems patient with history of lymphoma as well as here for check on her weight.  Patient was taking Topamax and is lost 11 pounds since September.  Patient also history of bipolar as well as chronic pain in her back and is doing well.  Patient also hypothyroidism and has lab work that is due when she comes back to see us in February.  Also she is due for DEXA scan which have ordered for her also.  Patient is doing well with her Topamax and not having any problems.  I will see patient back in February for her next routine checkup for her thyroid and we will await her in her visit from her pain doctor and her back specialist in regards to her recent MRI.          Review of Systems   Constitutional:  Negative for activity change, appetite change, fatigue and fever.   HENT:  Negative for congestion, ear pain, postnasal drip, rhinorrhea, sinus pressure, sinus pain, sneezing and sore throat.    Eyes:  Negative for pain and redness.   Respiratory:  Negative for apnea, cough, chest tightness, shortness of breath and wheezing.    Cardiovascular:  Negative for chest pain, palpitations and leg swelling.   Gastrointestinal:  Negative for abdominal pain, constipation, diarrhea, nausea and vomiting.   Endocrine: Negative for cold intolerance and heat intolerance.   Genitourinary:  Negative for difficulty urinating, dysuria, frequency, hematuria and urgency.   Musculoskeletal:  Negative for arthralgias, back pain, gait problem and myalgias.   Skin:  Negative for rash.   Neurological:  Negative for dizziness, speech difficulty, weakness, numbness and headaches.   Hematological:  Does not bruise/bleed easily.   Psychiatric/Behavioral:  Negative for agitation, confusion and hallucinations.            Objective     There were no vitals taken for this  visit.    Physical Exam  Constitutional:       Appearance: Normal appearance. She is not ill-appearing.   HENT:      Head: Normocephalic and atraumatic.      Right Ear: Tympanic membrane normal.      Left Ear: Tympanic membrane normal.      Nose: Nose normal.      Mouth/Throat:      Mouth: Mucous membranes are moist.   Eyes:      Extraocular Movements: Extraocular movements intact.      Conjunctiva/sclera: Conjunctivae normal.      Pupils: Pupils are equal, round, and reactive to light.   Cardiovascular:      Rate and Rhythm: Normal rate and regular rhythm.   Pulmonary:      Effort: Pulmonary effort is normal. No respiratory distress.      Breath sounds: Normal breath sounds. No wheezing.   Abdominal:      General: Bowel sounds are normal.      Palpations: Abdomen is soft.      Tenderness: There is no abdominal tenderness.   Musculoskeletal:         General: No tenderness. Normal range of motion.      Cervical back: Normal range of motion and neck supple.      Right lower leg: No edema.      Left lower leg: No edema.   Skin:     General: Skin is warm and dry.   Neurological:      Mental Status: She is alert and oriented to person, place, and time.   Psychiatric:         Mood and Affect: Mood normal.         Behavior: Behavior normal.         Thought Content: Thought content normal.         Judgment: Judgment normal.

## 2024-10-28 ENCOUNTER — OFFICE VISIT (OUTPATIENT)
Dept: FAMILY MEDICINE CLINIC | Facility: CLINIC | Age: 74
End: 2024-10-28
Payer: COMMERCIAL

## 2024-10-28 VITALS
RESPIRATION RATE: 18 BRPM | HEART RATE: 66 BPM | HEIGHT: 65 IN | OXYGEN SATURATION: 96 % | SYSTOLIC BLOOD PRESSURE: 126 MMHG | TEMPERATURE: 97.3 F | BODY MASS INDEX: 34.16 KG/M2 | DIASTOLIC BLOOD PRESSURE: 76 MMHG | WEIGHT: 205 LBS

## 2024-10-28 DIAGNOSIS — E66.812 CLASS 2 OBESITY WITHOUT SERIOUS COMORBIDITY IN ADULT, UNSPECIFIED BMI, UNSPECIFIED OBESITY TYPE: Primary | ICD-10-CM

## 2024-10-28 DIAGNOSIS — Z78.0 MENOPAUSE: ICD-10-CM

## 2024-10-28 DIAGNOSIS — F32.1 CURRENT MODERATE EPISODE OF MAJOR DEPRESSIVE DISORDER WITHOUT PRIOR EPISODE (HCC): ICD-10-CM

## 2024-10-28 DIAGNOSIS — E03.4 HYPOTHYROIDISM DUE TO ACQUIRED ATROPHY OF THYROID: ICD-10-CM

## 2024-10-28 DIAGNOSIS — K21.9 GASTROESOPHAGEAL REFLUX DISEASE, UNSPECIFIED WHETHER ESOPHAGITIS PRESENT: ICD-10-CM

## 2024-10-28 PROCEDURE — G2211 COMPLEX E/M VISIT ADD ON: HCPCS | Performed by: FAMILY MEDICINE

## 2024-10-28 PROCEDURE — 99214 OFFICE O/P EST MOD 30 MIN: CPT | Performed by: FAMILY MEDICINE

## 2024-10-30 ENCOUNTER — OFFICE VISIT (OUTPATIENT)
Dept: PAIN MEDICINE | Facility: CLINIC | Age: 74
End: 2024-10-30
Payer: COMMERCIAL

## 2024-10-30 VITALS — BODY MASS INDEX: 34.16 KG/M2 | HEIGHT: 65 IN | WEIGHT: 205 LBS

## 2024-10-30 DIAGNOSIS — M51.26 LUMBAR DISC HERNIATION: ICD-10-CM

## 2024-10-30 DIAGNOSIS — M47.816 LUMBAR SPONDYLOSIS: Primary | ICD-10-CM

## 2024-10-30 PROCEDURE — 99214 OFFICE O/P EST MOD 30 MIN: CPT | Performed by: PAIN MEDICINE

## 2024-10-30 NOTE — PROGRESS NOTES
Assessment  1. Lumbar spondylosis  2. Lumbar disc herniation          74 female status post lumbar fusion L4-5 4 years prior [facility] with persistent low back pain ongoing for 1 year axial in nature she has tenderness palpation lumbar facets at L5-S1 as well as tenderness palpation of the sacroiliac joints.      Her pain is primary axial in nature without significant SI joint pathology will recommend a bilateral L4-5 medial branch block under fluoroscopic guidance.    If successful after II nerve blocks we will proceed with nerve ablation.  Consider caudal epidural            My impressions and treatment recommendations were discussed in detail with the patient who verbalized understanding and had no further questions.  Discharge instructions were provided. I personally saw and examined the patient and I agree with the above discussed plan of care.    Plan      No orders of the defined types were placed in this encounter.      No orders of the defined types were placed in this encounter.      History of Present Illness  Follow-up 10/30/2024  Renay comes for follow-up reporting her back pain is 7-10 aching throbbing the lower back with activity such as bending twisting turning.  She completed MRI of her lumbar spine here to review.  Pain is currently 7 out of 10.        Consult  Renay Herrera is a 74 y.o. female with relevant PMH of acid reflux thrombocytopenia history of L4-5 fusion with symptoms ongoing for the past 1 year moderate-severe intensity in terms of pain pain is constant affecting her symptoms all the time she feels a 30 to 60% of the time pressure like pain always hurting with activities that radiate to the lower extremities with weakness she is on therapy 6 weeks last 6 months no bowel bladder incontinence she is not on blood thinners no allergies to contrast dye.      I have personally reviewed and/or updated the patient's past medical history, past surgical history, family history, social  history, current medications, allergies, and vital signs today.     Review of Systems   Musculoskeletal:  Positive for arthralgias, joint swelling and myalgias.   All other systems reviewed and are negative.      Patient Active Problem List   Diagnosis    Hypothyroidism    Current moderate episode of major depressive disorder without prior episode (HCC)    GERD (gastroesophageal reflux disease)    History of hip joint replacement by other means    History of total knee arthroplasty, right    Laryngopharyngeal reflux    History of pulmonary embolism    History of lumbosacral spine surgery    Bipolar affective disorder, currently depressed, mild (HCC)    Class 2 obesity without serious comorbidity in adult    Small B-cell lymphoma (HCC)    Sacroiliitis, not elsewhere classified (HCC)    Thrombocytopenia (HCC)    High ankle sprain of left lower extremity    Right ureteral stone with hydronephrosis    Hydronephrosis of right kidney    Anxiety       Past Medical History:   Diagnosis Date    Allergic     Anxiety     Cancer (HCC) Cant remember    Depression 20 years ago    Disease of thyroid gland     GERD (gastroesophageal reflux disease)     H/O bilateral hip replacements     Kidney stone April 2023    Non Hodgkin's lymphoma (HCC)        Past Surgical History:   Procedure Laterality Date    BACK SURGERY      lumbar spine    FL RETROGRADE PYELOGRAM  04/11/2023    HIP SURGERY Bilateral     JOINT REPLACEMENT      KNEE SURGERY Right     [place] CYSTO/URETERO W/LITHOTRIPSY &INDWELL STENT INSRT Right 04/11/2023    Procedure: CYSTOSCOPY URETEROSCOPY WITH RETROGRADE PYELOGRAM AND INSERTION STENT URETERAL, STONE BASKET EXTRACTION;  Surgeon: Hung Lau MD;  Location: [facility];  Service: Urology    TOTAL SHOULDER REPLACEMENT Right     TUBAL LIGATION         Family History   Problem Relation Age of Onset    Lymphoma Mother     Cancer Mother     No Known Problems Father     No Known Problems Daughter     No Known Problems Daughter  "    No Known Problems Maternal Grandmother     No Known Problems Maternal Grandfather     No Known Problems Paternal Grandmother     No Known Problems Paternal Grandfather     No Known Problems Brother        Social History     Occupational History    Not on file   Tobacco Use    Smoking status: Never     Passive exposure: Never    Smokeless tobacco: Never   Vaping Use    Vaping status: Never Used   Substance and Sexual Activity    Alcohol use: Not Currently     Comment: wine    Drug use: Never    Sexual activity: Not Currently     Partners: Male     Birth control/protection: Female Sterilization       Current Outpatient Medications on File Prior to Visit   Medication Sig    CALCIUM PO Take by mouth    dicyclomine (BENTYL) 10 mg capsule Take 1 capsule  by mouth daily    divalproex sodium (DEPAKOTE ER) 500 mg 24 hr tablet Take 1 tablet by mouth daily    famotidine (PEPCID) 20 mg tablet Take 1 tablet (20 mg total) by mouth 2 (two) times a day    levothyroxine 75 mcg tablet TAKE ONE TABLET BY MOUTH IN THE EARLY MORNING    multivitamin (THERAGRAN) TABS Take 1 tablet by mouth daily    naproxen (Naprosyn) 500 mg tablet Take 1 tablet (500 mg total) by mouth 2 (two) times a day with meals    pantoprazole (PROTONIX) 40 mg tablet Take 1 tablet by mouth daily before breakfast    topiramate (TOPAMAX) 50 MG tablet TAKE 1 TABLET BY MOUTH TWICE A DAY    traZODone (DESYREL) 50 mg tablet Take 1 tablet by mouth daily at bedtime as needed for sleep    venlafaxine (EFFEXOR-XR) 150 mg 24 hr capsule TAKE ONE CAPSULE BY MOUTH EVERY DAY     No current facility-administered medications on file prior to visit.       Allergies   Allergen Reactions    Clindamycin Hives         Physical Exam    Ht 5' 5\" (1.651 m)   Wt 93 kg (205 lb)   BMI 34.11 kg/m²           MSK:    Lumbar Spine:  No masses or atrophy,    Range of motion - Decreased extension/flexion  Palpation -  Tenderness to palpation in the lumbar parapsinals   PSIS tenderness + " bilaterally  Vel's/FEDE + ibilatearlly  Gaenslen's + bilaterally  SLR neg       Strength Right Left   Hip flexion L1,2 5 5   Knee extension L3 5 5   Ankle dorsiflexion L4 5 5   Great toe extension L5 5 5   Ankle Plantarflexion S1 5 5       Sensory Exam:  intact to light touch bilateral LE       Reflexes:     Right Left   Biceps 2+ 2+   Triceps 2+ 2+   Brachioradialis 2+ 2+   Patellar 2+ 2+   Achilles 2+ 2+   Babinski neg neg        Gait normal                  Imaging  X-ray lumbar spine 2024 September  L1 compression fracture  L4-5 instrumented fusion with adjacent segment degeneration at L5-S1    MRI lumbar spine  L3-4 moderate stenosis  L5-S1 central disc protrusion bilateral neuroforaminal narrowing  Facet arthropathy L5-S1 and L4-5

## 2024-11-04 DIAGNOSIS — M48.061 SPINAL STENOSIS OF LUMBAR REGION WITHOUT NEUROGENIC CLAUDICATION: ICD-10-CM

## 2024-11-04 NOTE — TELEPHONE ENCOUNTER
Reason for call:   [x] Refill   [] Prior Auth  [] Other:     Office:   [x] PCP/Provider -   [] Specialty/Provider -     Medication: naproxen (Naprosyn) 500 mg     Dose/Frequency: Take 1 tablet (500 mg total) by mouth 2 (two) times a day with meals     Quantity: 60    Pharmacy: [facility] - [facility] - [address] [place] [address]     Does the patient have enough for 3 days?   [x] Yes   [] No - Send as HP to POD

## 2024-11-05 RX ORDER — NAPROXEN 500 MG/1
500 TABLET ORAL 2 TIMES DAILY WITH MEALS
Qty: 60 TABLET | Refills: 1 | Status: SHIPPED | OUTPATIENT
Start: 2024-11-05

## 2024-11-11 DIAGNOSIS — F32.1 CURRENT MODERATE EPISODE OF MAJOR DEPRESSIVE DISORDER WITHOUT PRIOR EPISODE (HCC): ICD-10-CM

## 2024-11-11 DIAGNOSIS — E03.9 HYPOTHYROIDISM, UNSPECIFIED TYPE: ICD-10-CM

## 2024-11-12 RX ORDER — LEVOTHYROXINE SODIUM 75 UG/1
TABLET ORAL
Qty: 90 TABLET | Refills: 1 | Status: SHIPPED | OUTPATIENT
Start: 2024-11-12

## 2024-11-12 RX ORDER — VENLAFAXINE HYDROCHLORIDE 150 MG/1
150 CAPSULE, EXTENDED RELEASE ORAL DAILY
Qty: 90 CAPSULE | Refills: 1 | Status: SHIPPED | OUTPATIENT
Start: 2024-11-12

## 2024-12-05 ENCOUNTER — HOSPITAL ENCOUNTER (OUTPATIENT)
Dept: RADIOLOGY | Facility: HOSPITAL | Age: 74
Discharge: HOME | End: 2024-12-05
Attending: PAIN MEDICINE
Payer: COMMERCIAL

## 2024-12-05 VITALS
TEMPERATURE: 97.2 F | HEART RATE: 57 BPM | RESPIRATION RATE: 17 BRPM | OXYGEN SATURATION: 99 % | DIASTOLIC BLOOD PRESSURE: 87 MMHG | SYSTOLIC BLOOD PRESSURE: 131 MMHG

## 2024-12-05 DIAGNOSIS — M47.816 LUMBAR SPONDYLOSIS: ICD-10-CM

## 2024-12-05 PROCEDURE — 64493 INJ PARAVERT F JNT L/S 1 LEV: CPT | Performed by: PAIN MEDICINE

## 2024-12-05 RX ORDER — BUPIVACAINE HCL/PF 2.5 MG/ML
3 VIAL (ML) INJECTION ONCE
Status: COMPLETED | OUTPATIENT
Start: 2024-12-05 | End: 2024-12-05

## 2024-12-05 RX ADMIN — BUPIVACAINE HYDROCHLORIDE 3 ML: 2.5 INJECTION, SOLUTION EPIDURAL; INFILTRATION; INTRACAUDAL at 09:14

## 2024-12-05 NOTE — H&P
History of Present Illness: The patient is a 74 y.o. female who presents with complaints of low back pain here for bilateral L45 MBB # 1    Past Medical History:   Diagnosis Date    Allergic     Anxiety     Cancer (HCC) Cant remember    Depression 20 years ago    Disease of thyroid gland     GERD (gastroesophageal reflux disease)     H/O bilateral hip replacements     Kidney stone April 2023    Non Hodgkin's lymphoma (HCC)        Past Surgical History:   Procedure Laterality Date    BACK SURGERY      lumbar spine    FL RETROGRADE PYELOGRAM  04/11/2023    HIP SURGERY Bilateral     JOINT REPLACEMENT      KNEE SURGERY Right     [place] CYSTO/URETERO W/LITHOTRIPSY &INDWELL STENT INSRT Right 04/11/2023    Procedure: CYSTOSCOPY URETEROSCOPY WITH RETROGRADE PYELOGRAM AND INSERTION STENT URETERAL, STONE BASKET EXTRACTION;  Surgeon: Hung Lau MD;  Location[facility];  Service: Urology    TOTAL SHOULDER REPLACEMENT Right     TUBAL LIGATION           Current Outpatient Medications:     CALCIUM PO, Take by mouth, Disp: , Rfl:     dicyclomine (BENTYL) 10 mg capsule, Take 1 capsule  by mouth daily, Disp: 90 capsule, Rfl: 1    divalproex sodium (DEPAKOTE ER) 500 mg 24 hr tablet, Take 1 tablet by mouth daily, Disp: 100 tablet, Rfl: 1    famotidine (PEPCID) 20 mg tablet, Take 1 tablet (20 mg total) by mouth 2 (two) times a day, Disp: 180 tablet, Rfl: 1    levothyroxine 75 mcg tablet, TAKE ONE TABLET BY MOUTH IN THE EARLY MORNING, Disp: 90 tablet, Rfl: 1    multivitamin (THERAGRAN) TABS, Take 1 tablet by mouth daily, Disp: , Rfl:     naproxen (Naprosyn) 500 mg tablet, Take 1 tablet (500 mg total) by mouth 2 (two) times a day with meals, Disp: 60 tablet, Rfl: 1    pantoprazole (PROTONIX) 40 mg tablet, Take 1 tablet by mouth daily before breakfast, Disp: 90 tablet, Rfl: 1    topiramate (TOPAMAX) 50 MG tablet, TAKE 1 TABLET BY MOUTH TWICE A DAY, Disp: 180 tablet, Rfl: 1    traZODone (DESYREL) 50 mg tablet, Take 1 tablet by mouth  daily at bedtime as needed for sleep, Disp: 90 tablet, Rfl: 1    venlafaxine (EFFEXOR-XR) 150 mg 24 hr capsule, TAKE ONE CAPSULE BY MOUTH EVERY DAY, Disp: 90 capsule, Rfl: 1    Allergies   Allergen Reactions    Clindamycin Hives       Physical Exam:   Vitals:    12/05/24 0856   BP: 132/83   Pulse: 66   Resp: 17   Temp: (!) 97.2 °F (36.2 °C)   SpO2: 99%     General: Awake, Alert, Oriented x 3, Mood and affect appropriate  Respiratory: Respirations even and unlabored  Cardiovascular: Peripheral pulses intact; no edema  Musculoskeletal Exam: TTP low back facets    ASA Score: 2    Patient/Chart Verification  Patient ID Verified: Verbal  ID Band Applied: No  Consents Confirmed: Procedural  H&P( within 30 days) Verified: To be obtained in the Procedural area  Allergies Reviewed: Yes  Anticoag/NSAID held?: NA  Currently on antibiotics?: No    Assessment:   1. Lumbar spondylosis        Plan: bilateral L45 MBB #1    The risks of the procedure were discussed in detail.  These risks include infection, increased pain, paralysis, bleeding.  Patient understands the risks and is willing to pursue with the procedure

## 2024-12-05 NOTE — DISCHARGE INSTR - LAB

## 2025-01-02 DIAGNOSIS — Z79.2 PROPHYLACTIC ANTIBIOTIC: ICD-10-CM

## 2025-01-03 RX ORDER — AMOXICILLIN 500 MG/1
TABLET, FILM COATED ORAL
Qty: 4 TABLET | Refills: 5 | Status: SHIPPED | OUTPATIENT
Start: 2025-01-03 | End: 2025-01-04

## 2025-01-07 DIAGNOSIS — M48.061 SPINAL STENOSIS OF LUMBAR REGION WITHOUT NEUROGENIC CLAUDICATION: ICD-10-CM

## 2025-01-08 ENCOUNTER — OFFICE VISIT (OUTPATIENT)
Dept: FAMILY MEDICINE CLINIC | Facility: CLINIC | Age: 75
End: 2025-01-08
Payer: COMMERCIAL

## 2025-01-08 VITALS
TEMPERATURE: 97.9 F | WEIGHT: 203.4 LBS | HEIGHT: 65 IN | DIASTOLIC BLOOD PRESSURE: 80 MMHG | HEART RATE: 76 BPM | OXYGEN SATURATION: 97 % | RESPIRATION RATE: 18 BRPM | SYSTOLIC BLOOD PRESSURE: 130 MMHG | BODY MASS INDEX: 33.89 KG/M2

## 2025-01-08 DIAGNOSIS — F31.31 BIPOLAR AFFECTIVE DISORDER, CURRENTLY DEPRESSED, MILD (HCC): ICD-10-CM

## 2025-01-08 DIAGNOSIS — D69.6 THROMBOCYTOPENIA (HCC): ICD-10-CM

## 2025-01-08 DIAGNOSIS — R30.0 DYSURIA: Primary | ICD-10-CM

## 2025-01-08 DIAGNOSIS — F32.1 CURRENT MODERATE EPISODE OF MAJOR DEPRESSIVE DISORDER WITHOUT PRIOR EPISODE (HCC): ICD-10-CM

## 2025-01-08 DIAGNOSIS — M46.1 SACROILIITIS, NOT ELSEWHERE CLASSIFIED (HCC): ICD-10-CM

## 2025-01-08 DIAGNOSIS — C83.00 SMALL B-CELL LYMPHOMA, UNSPECIFIED BODY REGION (HCC): ICD-10-CM

## 2025-01-08 DIAGNOSIS — N30.01 ACUTE CYSTITIS WITH HEMATURIA: ICD-10-CM

## 2025-01-08 LAB
BILIRUB UR QL STRIP: NEGATIVE
CLARITY UR: CLEAR
COLOR UR: YELLOW
GLUCOSE UR STRIP-MCNC: NEGATIVE MG/DL
HGB UR QL STRIP.AUTO: ABNORMAL
KETONES UR STRIP-MCNC: NEGATIVE MG/DL
LEUKOCYTE ESTERASE UR QL STRIP: ABNORMAL
NITRITE UR QL STRIP: NEGATIVE
PH UR STRIP.AUTO: 6 [PH]
PROT UR STRIP-MCNC: ABNORMAL MG/DL
SP GR UR STRIP.AUTO: 1.02 (ref 1–1.03)
UROBILINOGEN UR STRIP-ACNC: 2 MG/DL

## 2025-01-08 PROCEDURE — 81001 URINALYSIS AUTO W/SCOPE: CPT | Performed by: FAMILY MEDICINE

## 2025-01-08 PROCEDURE — 87086 URINE CULTURE/COLONY COUNT: CPT | Performed by: FAMILY MEDICINE

## 2025-01-08 PROCEDURE — 99213 OFFICE O/P EST LOW 20 MIN: CPT | Performed by: FAMILY MEDICINE

## 2025-01-08 RX ORDER — SULFAMETHOXAZOLE AND TRIMETHOPRIM 800; 160 MG/1; MG/1
1 TABLET ORAL 2 TIMES DAILY
Qty: 20 TABLET | Refills: 0 | Status: SHIPPED | OUTPATIENT
Start: 2025-01-08 | End: 2025-01-18

## 2025-01-08 RX ORDER — NAPROXEN 500 MG/1
TABLET ORAL
Qty: 60 TABLET | Refills: 1 | Status: SHIPPED | OUTPATIENT
Start: 2025-01-08

## 2025-01-08 NOTE — PROGRESS NOTES
Name: Renay Herrera      : 1950      MRN: [id number]  Encounter Provider: Jamison May MD  Encounter Date: 2025   Encounter department: [facility]  :  Assessment & Plan  Dysuria    Orders:    UA w Reflex to Microscopic w Reflex to Culture; Future    Small B-cell lymphoma, unspecified body region (HCC)         Sacroiliitis, not elsewhere classified (HCC)         Bipolar affective disorder, currently depressed, mild (HCC)         Current moderate episode of major depressive disorder without prior episode (HCC)         Thrombocytopenia (HCC)                History of Present Illness     74-year-ol patient drink plenty of fluids to try and flush the bladder also.  d female today for checkup on to acute problems.  Patient with a recent dysuria she has had for the last 2 or 3 days with burning on urination and here with positive dip on a urinalysis.  Patient also with some nasal discharge but she when she leans her head forward with some cough and congestion with some green mucus..  Patient with a possible sinus infection she does have what appears to be a UTI we will give her some Bactrim for that but we will give her another for 10 days to cover for any possible sinus infection she may be having on top of this and will also add some Flonase nasal spray to help her with that also.  We will call her back results of her urinalysis with a culture but the Bactrim should cover this if not we will let her know for sure and change antibiotics as appropriate.      Review of Systems   Constitutional:  Negative for activity change, appetite change, fatigue and fever.   HENT:  Negative for congestion, ear pain, postnasal drip, rhinorrhea, sinus pressure, sinus pain, sneezing and sore throat.    Eyes:  Negative for pain and redness.   Respiratory:  Negative for apnea, cough, chest tightness, shortness of breath and wheezing.    Cardiovascular:  Negative for chest pain, palpitations and  "leg swelling.   Gastrointestinal:  Negative for abdominal pain, constipation, diarrhea, nausea and vomiting.   Endocrine: Negative for cold intolerance and heat intolerance.   Genitourinary:  Positive for difficulty urinating and dysuria. Negative for frequency, hematuria and urgency.   Musculoskeletal:  Negative for arthralgias, back pain, gait problem and myalgias.   Skin:  Negative for rash.   Neurological:  Negative for dizziness, speech difficulty, weakness, numbness and headaches.   Hematological:  Does not bruise/bleed easily.   Psychiatric/Behavioral:  Negative for agitation, confusion and hallucinations.        Objective   /80 (BP Location: Left arm, Patient Position: Sitting, Cuff Size: Large)   Pulse 76   Temp 97.9 °F (36.6 °C)   Resp 18   Ht 5' 5\" (1.651 m)   Wt 92.3 kg (203 lb 6.4 oz)   SpO2 97%   BMI 33.85 kg/m²      Physical Exam  Constitutional:       Appearance: Normal appearance. She is not ill-appearing.   HENT:      Head: Normocephalic and atraumatic.      Right Ear: Tympanic membrane normal.      Left Ear: Tympanic membrane normal.      Nose: Nose normal.      Mouth/Throat:      Mouth: Mucous membranes are moist.   Eyes:      Extraocular Movements: Extraocular movements intact.      Conjunctiva/sclera: Conjunctivae normal.      Pupils: Pupils are equal, round, and reactive to light.   Cardiovascular:      Rate and Rhythm: Normal rate and regular rhythm.   Pulmonary:      Effort: Pulmonary effort is normal. No respiratory distress.      Breath sounds: Normal breath sounds. No wheezing.   Abdominal:      General: Bowel sounds are normal.      Palpations: Abdomen is soft.      Tenderness: There is no abdominal tenderness.   Musculoskeletal:         General: No tenderness. Normal range of motion.      Cervical back: Normal range of motion and neck supple.      Right lower leg: No edema.      Left lower leg: No edema.   Skin:     General: Skin is warm and dry.   Neurological:      Mental " Status: She is alert and oriented to person, place, and time.   Psychiatric:         Mood and Affect: Mood normal.         Behavior: Behavior normal.         Thought Content: Thought content normal.         Judgment: Judgment normal.

## 2025-01-09 LAB
BACTERIA UR QL AUTO: ABNORMAL /HPF
CAOX CRY URNS QL MICRO: ABNORMAL /HPF
MUCOUS THREADS UR QL AUTO: ABNORMAL
NON-SQ EPI CELLS URNS QL MICRO: ABNORMAL /HPF
RBC #/AREA URNS AUTO: ABNORMAL /HPF
WBC #/AREA URNS AUTO: ABNORMAL /HPF

## 2025-01-10 LAB — BACTERIA UR CULT: ABNORMAL

## 2025-01-11 ENCOUNTER — APPOINTMENT (EMERGENCY)
Dept: CT IMAGING | Facility: HOSPITAL | Age: 75
End: 2025-01-11
Payer: COMMERCIAL

## 2025-01-11 ENCOUNTER — HOSPITAL ENCOUNTER (EMERGENCY)
Facility: HOSPITAL | Age: 75
Discharge: HOME | End: 2025-01-11
Attending: INTERNAL MEDICINE | Admitting: INTERNAL MEDICINE
Payer: COMMERCIAL

## 2025-01-11 VITALS
HEART RATE: 76 BPM | OXYGEN SATURATION: 100 % | TEMPERATURE: 97.8 F | DIASTOLIC BLOOD PRESSURE: 84 MMHG | RESPIRATION RATE: 16 BRPM | SYSTOLIC BLOOD PRESSURE: 177 MMHG

## 2025-01-11 DIAGNOSIS — K52.9 COLITIS: ICD-10-CM

## 2025-01-11 DIAGNOSIS — N20.1 LEFT URETERAL STONE: Primary | ICD-10-CM

## 2025-01-11 LAB
ALBUMIN SERPL BCG-MCNC: 4.2 G/DL (ref 3.5–5)
ALP SERPL-CCNC: 50 U/L (ref 34–104)
ALT SERPL W P-5'-P-CCNC: 18 U/L (ref 7–52)
ANION GAP SERPL CALCULATED.3IONS-SCNC: 8 MMOL/L (ref 4–13)
AST SERPL W P-5'-P-CCNC: 18 U/L (ref 13–39)
BASOPHILS # BLD AUTO: 0.06 THOUSANDS/ΜL (ref 0–0.1)
BASOPHILS NFR BLD AUTO: 1 % (ref 0–1)
BILIRUB SERPL-MCNC: 0.39 MG/DL (ref 0.2–1)
BILIRUB UR QL STRIP: NEGATIVE
BUN SERPL-MCNC: 33 MG/DL (ref 5–25)
CALCIUM SERPL-MCNC: 9.8 MG/DL (ref 8.4–10.2)
CHLORIDE SERPL-SCNC: 108 MMOL/L (ref 96–108)
CLARITY UR: CLEAR
CO2 SERPL-SCNC: 23 MMOL/L (ref 21–32)
COLOR UR: YELLOW
CREAT SERPL-MCNC: 1.26 MG/DL (ref 0.6–1.3)
EOSINOPHIL # BLD AUTO: 0.14 THOUSAND/ΜL (ref 0–0.61)
EOSINOPHIL NFR BLD AUTO: 2 % (ref 0–6)
ERYTHROCYTE [DISTWIDTH] IN BLOOD BY AUTOMATED COUNT: 13 % (ref 11.6–15.1)
FLUAV RNA RESP QL NAA+PROBE: NEGATIVE
FLUBV RNA RESP QL NAA+PROBE: NEGATIVE
GFR SERPL CREATININE-BSD FRML MDRD: 41 ML/MIN/1.73SQ M
GLUCOSE SERPL-MCNC: 108 MG/DL (ref 65–140)
GLUCOSE UR STRIP-MCNC: NEGATIVE MG/DL
HCT VFR BLD AUTO: 47 % (ref 34.8–46.1)
HGB BLD-MCNC: 15.4 G/DL (ref 11.5–15.4)
HGB UR QL STRIP.AUTO: NEGATIVE
IMM GRANULOCYTES # BLD AUTO: 0.09 THOUSAND/UL (ref 0–0.2)
IMM GRANULOCYTES NFR BLD AUTO: 1 % (ref 0–2)
KETONES UR STRIP-MCNC: NEGATIVE MG/DL
LEUKOCYTE ESTERASE UR QL STRIP: NEGATIVE
LIPASE SERPL-CCNC: 36 U/L (ref 11–82)
LYMPHOCYTES # BLD AUTO: 2.28 THOUSANDS/ΜL (ref 0.6–4.47)
LYMPHOCYTES NFR BLD AUTO: 26 % (ref 14–44)
MCH RBC QN AUTO: 30.5 PG (ref 26.8–34.3)
MCHC RBC AUTO-ENTMCNC: 32.8 G/DL (ref 31.4–37.4)
MCV RBC AUTO: 93 FL (ref 82–98)
MONOCYTES # BLD AUTO: 0.81 THOUSAND/ΜL (ref 0.17–1.22)
MONOCYTES NFR BLD AUTO: 9 % (ref 4–12)
NEUTROPHILS # BLD AUTO: 5.25 THOUSANDS/ΜL (ref 1.85–7.62)
NEUTS SEG NFR BLD AUTO: 61 % (ref 43–75)
NITRITE UR QL STRIP: NEGATIVE
NRBC BLD AUTO-RTO: 0 /100 WBCS
PH UR STRIP.AUTO: 6 [PH]
PLATELET # BLD AUTO: 174 THOUSANDS/UL (ref 149–390)
PMV BLD AUTO: 9.5 FL (ref 8.9–12.7)
POTASSIUM SERPL-SCNC: 4.8 MMOL/L (ref 3.5–5.3)
PROT SERPL-MCNC: 6.5 G/DL (ref 6.4–8.4)
PROT UR STRIP-MCNC: NEGATIVE MG/DL
RBC # BLD AUTO: 5.05 MILLION/UL (ref 3.81–5.12)
RSV RNA RESP QL NAA+PROBE: NEGATIVE
SARS-COV-2 RNA RESP QL NAA+PROBE: NEGATIVE
SODIUM SERPL-SCNC: 139 MMOL/L (ref 135–147)
SP GR UR STRIP.AUTO: 1.02 (ref 1–1.03)
UROBILINOGEN UR QL STRIP.AUTO: 0.2 E.U./DL
WBC # BLD AUTO: 8.63 THOUSAND/UL (ref 4.31–10.16)

## 2025-01-11 PROCEDURE — 99285 EMERGENCY DEPT VISIT HI MDM: CPT | Performed by: PHYSICIAN ASSISTANT

## 2025-01-11 PROCEDURE — 74178 CT ABD&PLV WO CNTR FLWD CNTR: CPT

## 2025-01-11 PROCEDURE — 96374 THER/PROPH/DIAG INJ IV PUSH: CPT

## 2025-01-11 PROCEDURE — 83690 ASSAY OF LIPASE: CPT | Performed by: PHYSICIAN ASSISTANT

## 2025-01-11 PROCEDURE — 96361 HYDRATE IV INFUSION ADD-ON: CPT

## 2025-01-11 PROCEDURE — 85025 COMPLETE CBC W/AUTO DIFF WBC: CPT | Performed by: PHYSICIAN ASSISTANT

## 2025-01-11 PROCEDURE — 81003 URINALYSIS AUTO W/O SCOPE: CPT | Performed by: PHYSICIAN ASSISTANT

## 2025-01-11 PROCEDURE — 99284 EMERGENCY DEPT VISIT MOD MDM: CPT

## 2025-01-11 PROCEDURE — 36415 COLL VENOUS BLD VENIPUNCTURE: CPT | Performed by: PHYSICIAN ASSISTANT

## 2025-01-11 PROCEDURE — 96375 TX/PRO/DX INJ NEW DRUG ADDON: CPT

## 2025-01-11 PROCEDURE — 0241U HB NFCT DS VIR RESP RNA 4 TRGT: CPT | Performed by: PHYSICIAN ASSISTANT

## 2025-01-11 PROCEDURE — 93005 ELECTROCARDIOGRAM TRACING: CPT

## 2025-01-11 PROCEDURE — 80053 COMPREHEN METABOLIC PANEL: CPT | Performed by: PHYSICIAN ASSISTANT

## 2025-01-11 RX ORDER — ONDANSETRON 4 MG/1
4 TABLET, ORALLY DISINTEGRATING ORAL EVERY 6 HOURS PRN
Qty: 10 TABLET | Refills: 0 | Status: SHIPPED | OUTPATIENT
Start: 2025-01-11

## 2025-01-11 RX ORDER — TAMSULOSIN HYDROCHLORIDE 0.4 MG/1
0.4 CAPSULE ORAL
Qty: 7 CAPSULE | Refills: 0 | Status: SHIPPED | OUTPATIENT
Start: 2025-01-11

## 2025-01-11 RX ORDER — ONDANSETRON 2 MG/ML
4 INJECTION INTRAMUSCULAR; INTRAVENOUS ONCE
Status: COMPLETED | OUTPATIENT
Start: 2025-01-11 | End: 2025-01-11

## 2025-01-11 RX ORDER — OXYCODONE AND ACETAMINOPHEN 5; 325 MG/1; MG/1
1 TABLET ORAL EVERY 6 HOURS PRN
Qty: 12 TABLET | Refills: 0 | Status: SHIPPED | OUTPATIENT
Start: 2025-01-11 | End: 2025-01-14

## 2025-01-11 RX ORDER — KETOROLAC TROMETHAMINE 30 MG/ML
15 INJECTION, SOLUTION INTRAMUSCULAR; INTRAVENOUS ONCE
Status: COMPLETED | OUTPATIENT
Start: 2025-01-11 | End: 2025-01-11

## 2025-01-11 RX ADMIN — SODIUM CHLORIDE 1000 ML: 0.9 INJECTION, SOLUTION INTRAVENOUS at 10:22

## 2025-01-11 RX ADMIN — ONDANSETRON 4 MG: 2 INJECTION INTRAMUSCULAR; INTRAVENOUS at 13:09

## 2025-01-11 RX ADMIN — IOHEXOL 100 ML: 350 INJECTION, SOLUTION INTRAVENOUS at 11:44

## 2025-01-11 RX ADMIN — KETOROLAC TROMETHAMINE 15 MG: 30 INJECTION, SOLUTION INTRAMUSCULAR at 13:11

## 2025-01-11 NOTE — ED PROVIDER NOTES
Time reflects when diagnosis was documented in both MDM as applicable and the Disposition within this note       Time User Action Codes Description Comment    1/11/2025  1:17 PM Aye Bradley Add [N20.1] Left ureteral stone     1/11/2025  1:18 PM Aye Bradley Add [K52.9] Colitis           ED Disposition       ED Disposition   Discharge    Condition   Stable    Date/Time   Sat Jan 11, 2025  1:16 PM    Comment   Renay Herrera discharge to home/self care.                   Assessment & Plan       Medical Decision Making  Patient with urinary symptoms/sinus congestion was being treated with Bactrim by PCP, then developed lower abdominal pain, diarrhea, then acute severe left side pain, nausea/ vomiting today so came to emergency department  CT scan shows 2 mm left distal ureter stone as well as colitis.  Patient remained comfortable throughout ED evaluation, no further vomiting, joint decision making with patient that she is stable for discharge, outpatient follow-up, if symptoms of severe pain/vomiting/fever return she can return to emergency department.  PT to FU with urology    Amount and/or Complexity of Data Reviewed  Labs: ordered. Decision-making details documented in ED Course.  Radiology: ordered. Decision-making details documented in ED Course.  ECG/medicine tests: ordered and independent interpretation performed. Decision-making details documented in ED Course.     Details: Normal sinus rhythm at 68, nonspecific ST-T wave changes, no acute ischemia; flattened T waves appear new compared to old EKG from 4-    Risk  OTC drugs.  Prescription drug management.        ED Course as of 01/11/25 1938   Sat Jan 11, 2025   1045 UA shows no UTI   1045 CBC unremarkable   1118 LIPASE: 36  normal   1118 CMP unremarkable       Medications   sodium chloride 0.9 % bolus 1,000 mL (0 mL Intravenous Stopped 1/11/25 1344)   iohexol (OMNIPAQUE) 350 MG/ML injection (SINGLE-DOSE) 100 mL (100 mL Intravenous Given 1/11/25  1144)   ketorolac (TORADOL) injection 15 mg (15 mg Intravenous Given 1/11/25 1311)   ondansetron (ZOFRAN) injection 4 mg (4 mg Intravenous Given 1/11/25 1309)       ED Risk Strat Scores                          SBIRT 20yo+      Flowsheet Row Most Recent Value   Initial Alcohol Screen: US AUDIT-C     1. How often do you have a drink containing alcohol? 0 Filed at: 01/11/2025 0948   2. How many drinks containing alcohol do you have on a typical day you are drinking?  0 Filed at: 01/11/2025 0948   3b. FEMALE Any Age, or MALE 65+: How often do you have 4 or more drinks on one occassion? 0 Filed at: 01/11/2025 0948   Audit-C Score 0 Filed at: 01/11/2025 0948   NAY: How many times in the past year have you...    Used an illegal drug or used a prescription medication for non-medical reasons? Never Filed at: 01/11/2025 0948                            History of Present Illness       Chief Complaint   Patient presents with    Abdominal Pain     Seen at pcp on Wednesday and treated for UTI.  Last pm developed lower abdominal and left flank pain.  Ate a rice cake this morning and had vomiting and diarrhea.  Hx of stones in the past       Past Medical History:   Diagnosis Date    Allergic     Anxiety     Cancer (HCC) Cant remember    Depression 20 years ago    Disease of thyroid gland     GERD (gastroesophageal reflux disease)     H/O bilateral hip replacements     Kidney stone April 2023    Non Hodgkin's lymphoma (HCC)       Past Surgical History:   Procedure Laterality Date    BACK SURGERY      lumbar spine    FL RETROGRADE PYELOGRAM  04/11/2023    HIP SURGERY Bilateral     JOINT REPLACEMENT      KNEE SURGERY Right     NH CYSTO/URETERO W/LITHOTRIPSY &INDWELL STENT INSRT Right 04/11/2023    Procedure: CYSTOSCOPY URETEROSCOPY WITH RETROGRADE PYELOGRAM AND INSERTION STENT URETERAL, STONE BASKET EXTRACTION;  Surgeon: Hung Lau MD;  Location: [facility];  Service: Urology    TOTAL SHOULDER REPLACEMENT Right     TUBAL  LIGATION        Family History   Problem Relation Age of Onset    Lymphoma Mother     Cancer Mother     No Known Problems Father     No Known Problems Daughter     No Known Problems Daughter     No Known Problems Maternal Grandmother     No Known Problems Maternal Grandfather     No Known Problems Paternal Grandmother     No Known Problems Paternal Grandfather     No Known Problems Brother       Social History     Tobacco Use    Smoking status: Never     Passive exposure: Never    Smokeless tobacco: Never   Vaping Use    Vaping status: Never Used   Substance Use Topics    Alcohol use: Not Currently     Comment: wine    Drug use: Never      E-Cigarette/Vaping    E-Cigarette Use Never User       E-Cigarette/Vaping Substances    Nicotine No     THC No     CBD No     Flavoring No     Other No     Unknown No       I have reviewed and agree with the history as documented.     PMH: Hypothyroidism, Depression, GERD, Laryngopharyngeal reflux, Pulmonary embolism, Bipolar, Small B-cell lymphoma, Sacroiliitis, Right ureteral stone with hydronephrosis, Anxiety, Lumbar spondylosis, Diease of thyroid gland, Non Hodgkin's lymphoma,     PSH: Right THR, B/L THR, Rght TKR, Back surgery, FL retrograde pyelogram (04/11/2023), Right CYSTO/URETERO W/LITHOTRIPSY &INDWELL STENT 2023  Tubal ligation    Pt presents to ED complaining of persistent urinary frequency, diffuse lower abdominal/suprapubic pain with sudden left sided pain today, nonbloody diarrhea, then after eating rice cake/tea this am, had episode of nausea, vomiting.  Pt seen by PCP 3 days ago, 1/8 for sinus congestion and Urinary frequency, dysuria, treated for UTI/Sinusitis with Bactrim x 10 days, still taking.   Urine showed + UTI UC showed: <10,000 cfu/ml Gram Negative Jeovany , pt started on Bactrim for 10 days  No fever, cp, sob, abn vag bleeding/dc, rash          Review of Systems   Constitutional:  Negative for fever.   HENT:  Negative for sore throat.    Respiratory:   Negative for cough and shortness of breath.    Cardiovascular:  Negative for chest pain and leg swelling.   Gastrointestinal:  Positive for abdominal pain, diarrhea, nausea and vomiting.   Genitourinary:  Negative for dysuria and frequency.   Musculoskeletal:  Negative for myalgias.   Skin:  Negative for rash.   Neurological:  Negative for weakness.   All other systems reviewed and are negative.          Objective       ED Triage Vitals   Temperature Pulse Blood Pressure Respirations SpO2 Patient Position - Orthostatic VS   01/11/25 0949 01/11/25 0949 01/11/25 0949 01/11/25 0949 01/11/25 0949 01/11/25 0949   97.8 °F (36.6 °C) 76 (!) 177/84 16 100 % Lying      Temp Source Heart Rate Source BP Location FiO2 (%) Pain Score    01/11/25 0949 01/11/25 0949 01/11/25 0949 -- 01/11/25 0945    Oral Monitor Left arm  8      Vitals      Date and Time Temp Pulse SpO2 Resp BP Pain Score FACES Pain Rating User   01/11/25 1311 -- -- -- -- -- 7 -- DB   01/11/25 0949 97.8 °F (36.6 °C) 76 100 % 16 177/84 -- -- RR   01/11/25 0945 -- -- -- -- -- 8 -- RR            Physical Exam  Vitals and nursing note reviewed.   Constitutional:       General: She is not in acute distress.     Appearance: She is well-developed. She is obese.   HENT:      Head: Normocephalic and atraumatic.      Right Ear: External ear normal.      Left Ear: External ear normal.      Nose: Congestion and rhinorrhea present.      Mouth/Throat:      Mouth: Mucous membranes are moist.      Pharynx: Oropharynx is clear.   Eyes:      Conjunctiva/sclera: Conjunctivae normal.   Cardiovascular:      Rate and Rhythm: Normal rate and regular rhythm.   Pulmonary:      Effort: Pulmonary effort is normal. No respiratory distress.      Breath sounds: Normal breath sounds. No wheezing or rhonchi.   Abdominal:      General: Bowel sounds are normal.      Palpations: Abdomen is soft.      Tenderness: There is abdominal tenderness (mild diffuse lower abd tenderness). There is no right CVA  tenderness, left CVA tenderness, guarding or rebound.   Genitourinary:     Comments: deferred  Musculoskeletal:         General: Normal range of motion.      Cervical back: Normal range of motion.   Skin:     General: Skin is warm and dry.   Neurological:      Mental Status: She is alert.   Psychiatric:         Behavior: Behavior normal.         Results Reviewed       Procedure Component Value Units Date/Time    FLU/RSV/COVID - if FLU/RSV clinically relevant (2hr TAT) [[id number]]  (Normal) Collected: 01/11/25 1017    Lab Status: Final result Specimen: Nares from Nose Updated: 01/11/25 1114     SARS-CoV-2 Negative     INFLUENZA A PCR Negative     INFLUENZA B PCR Negative     RSV PCR Negative    Narrative:      This test has been performed using the CoV-2/Flu/RSV plus assay on the [employer] platform. This test has been validated by the  and verified by the performing laboratory.     This test is designed to amplify and detect the following: nucleocapsid (N), envelope (E), and RNA-dependent RNA polymerase (RdRP) genes of the SARS-CoV-2 genome; matrix (M), basic polymerase (PB2), and acidic protein (PA) segments of the influenza A genome; matrix (M) and non-structural protein (NS) segments of the influenza B genome, and the nucleocapsid genes of RSV A and RSV B.     Positive results are indicative of the presence of Flu A, Flu B, RSV, and/or SARS-CoV-2 RNA. Positive results for SARS-CoV-2 or suspected novel influenza should be reported to state, local, or federal health departments according to local reporting requirements.      All results should be assessed in conjunction with clinical presentation and other laboratory markers for clinical management.     FOR PEDIATRIC PATIENTS - copy/paste COVID Guidelines URL to browser: [email or web address]       Comprehensive metabolic panel [[id number]]  (Abnormal) Collected: 01/11/25 1018    Lab Status:  Final result Specimen: Blood from Arm, Right Updated: 01/11/25 1057     Sodium 139 mmol/L      Potassium 4.8 mmol/L      Chloride 108 mmol/L      CO2 23 mmol/L      ANION GAP 8 mmol/L      BUN 33 mg/dL      Creatinine 1.26 mg/dL      Glucose 108 mg/dL      Calcium 9.8 mg/dL      AST 18 U/L      ALT 18 U/L      Alkaline Phosphatase 50 U/L      Total Protein 6.5 g/dL      Albumin 4.2 g/dL      Total Bilirubin 0.39 mg/dL      eGFR 41 ml/min/1.73sq m     Narrative:      National Kidney Disease Foundation guidelines for Chronic Kidney Disease (CKD):     Stage 1 with normal or high GFR (GFR > 90 mL/min/1.73 square meters)    Stage 2 Mild CKD (GFR = 60-89 mL/min/1.73 square meters)    Stage 3A Moderate CKD (GFR = 45-59 mL/min/1.73 square meters)    Stage 3B Moderate CKD (GFR = 30-44 mL/min/1.73 square meters)    Stage 4 Severe CKD (GFR = 15-29 mL/min/1.73 square meters)    Stage 5 End Stage CKD (GFR <15 mL/min/1.73 square meters)  Note: GFR calculation is accurate only with a steady state creatinine    Lipase [[id number]]  (Normal) Collected: 01/11/25 1018    Lab Status: Final result Specimen: Blood from Arm, Right Updated: 01/11/25 1057     Lipase 36 u/L     UA w Reflex to Microscopic w Reflex to Culture [[id number]]  (Normal) Collected: 01/11/25 1022    Lab Status: Final result Specimen: Urine, Clean Catch Updated: 01/11/25 1041     Color, UA Yellow     Clarity, UA Clear     Specific Gravity, UA 1.020     pH, UA 6.0     Leukocytes, UA Negative     Nitrite, UA Negative     Protein, UA Negative mg/dl      Glucose, UA Negative mg/dl      Ketones, UA Negative mg/dl      Urobilinogen, UA 0.2 E.U./dl      Bilirubin, UA Negative     Occult Blood, UA Negative    CBC and differential [[id number]]  (Abnormal) Collected: 01/11/25 1018    Lab Status: Final result Specimen: Blood from Arm, Right Updated: 01/11/25 1030     WBC 8.63 Thousand/uL      RBC 5.05 Million/uL      Hemoglobin 15.4 g/dL      Hematocrit 47.0 %      MCV 93 fL       MCH 30.5 pg      MCHC 32.8 g/dL      RDW 13.0 %      MPV 9.5 fL      Platelets 174 Thousands/uL      nRBC 0 /100 WBCs      Segmented % 61 %      Immature Grans % 1 %      Lymphocytes % 26 %      Monocytes % 9 %      Eosinophils Relative 2 %      Basophils Relative 1 %      Absolute Neutrophils 5.25 Thousands/µL      Absolute Immature Grans 0.09 Thousand/uL      Absolute Lymphocytes 2.28 Thousands/µL      Absolute Monocytes 0.81 Thousand/µL      Eosinophils Absolute 0.14 Thousand/µL      Basophils Absolute 0.06 Thousands/µL             CT abdomen pelvis w wo contrast   Final Interpretation by Edmond Morejon MD (01/11 8721)      Obstructing 2 mm calculus in the distal left lumbar ureter 1 cm above the left ureterovesicular junction with mild left-sided hydronephrosis, left perinephric fat stranding and mild delay in the left nephrogram. Follow-up CT is recommended to to confirm    resolution of hydronephrosis after stone passage given degradation of imaging by streak artifact from bilateral hip prostheses.      Colitis extending from the rectum and retrograde to the distal transverse colon that is probably infectious/inflammatory.      Normal appendix and gallbladder.      Trace enhancement of the ureters. Upper urinary tract infection is not excluded.                  Workstation performed: [id number]             Procedures    ED Medication and Procedure Management   Prior to Admission Medications   Prescriptions Last Dose Informant Patient Reported? Taking?   CALCIUM PO  Self Yes No   Sig: Take by mouth   dicyclomine (BENTYL) 10 mg capsule   No No   Sig: Take 1 capsule  by mouth daily   divalproex sodium (DEPAKOTE ER) 500 mg 24 hr tablet  Self No No   Sig: Take 1 tablet by mouth daily   famotidine (PEPCID) 20 mg tablet  Self No No   Sig: Take 1 tablet (20 mg total) by mouth 2 (two) times a day   levothyroxine 75 mcg tablet   No No   Sig: TAKE ONE TABLET BY MOUTH IN THE EARLY MORNING   multivitamin (THERAGRAN)  TABS  Self Yes No   Sig: Take 1 tablet by mouth daily   naproxen (NAPROSYN) 500 mg tablet   No No   Sig: Take 1 tablet by mouth 2  times a day with meals   pantoprazole (PROTONIX) 40 mg tablet   No No   Sig: Take 1 tablet by mouth daily before breakfast   sulfamethoxazole-trimethoprim (BACTRIM DS) 800-160 mg per tablet 1/11/2025 Morning  No Yes   Sig: Take 1 tablet by mouth 2 (two) times a day for 10 days   topiramate (TOPAMAX) 50 MG tablet   No No   Sig: TAKE 1 TABLET BY MOUTH TWICE A DAY   traZODone (DESYREL) 50 mg tablet   No No   Sig: Take 1 tablet by mouth daily at bedtime as needed for sleep   venlafaxine (EFFEXOR-XR) 150 mg 24 hr capsule   No No   Sig: TAKE ONE CAPSULE BY MOUTH EVERY DAY      Facility-Administered Medications: None     Discharge Medication List as of 1/11/2025  1:22 PM        START taking these medications    Details   ondansetron (ZOFRAN-ODT) 4 mg disintegrating tablet Take 1 tablet (4 mg total) by mouth every 6 (six) hours as needed for nausea or vomiting, Starting Sat 1/11/2025, Normal      oxyCODONE-acetaminophen (PERCOCET) 5-325 mg per tablet Take 1 tablet by mouth every 6 (six) hours as needed for moderate pain for up to 3 days Max Daily Amount: 4 tablets, Starting Sat 1/11/2025, Until Tue 1/14/2025 at 2359, Normal      tamsulosin (FLOMAX) 0.4 mg Take 1 capsule (0.4 mg total) by mouth daily with dinner, Starting Sat 1/11/2025, Normal           CONTINUE these medications which have NOT CHANGED    Details   sulfamethoxazole-trimethoprim (BACTRIM DS) 800-160 mg per tablet Take 1 tablet by mouth 2 (two) times a day for 10 days, Starting Wed 1/8/2025, Until Sat 1/18/2025, Normal      CALCIUM PO Take by mouth, Historical Med      dicyclomine (BENTYL) 10 mg capsule Take 1 capsule  by mouth daily, Starting Tue 9/24/2024, Normal      divalproex sodium (DEPAKOTE ER) 500 mg 24 hr tablet Take 1 tablet by mouth daily, Starting Mon 7/29/2024, Normal      famotidine (PEPCID) 20 mg tablet Take 1 tablet  (20 mg total) by mouth 2 (two) times a day, Starting Wed 2/28/2024, Normal      levothyroxine 75 mcg tablet TAKE ONE TABLET BY MOUTH IN THE EARLY MORNING, Normal      multivitamin (THERAGRAN) TABS Take 1 tablet by mouth daily, Historical Med      naproxen (NAPROSYN) 500 mg tablet Take 1 tablet by mouth 2  times a day with meals, Normal      pantoprazole (PROTONIX) 40 mg tablet Take 1 tablet by mouth daily before breakfast, Starting Fri 8/23/2024, Normal      topiramate (TOPAMAX) 50 MG tablet TAKE 1 TABLET BY MOUTH TWICE A DAY, Starting Thu 9/26/2024, Normal      traZODone (DESYREL) 50 mg tablet Take 1 tablet by mouth daily at bedtime as needed for sleep, Starting Tue 10/8/2024, Normal      venlafaxine (EFFEXOR-XR) 150 mg 24 hr capsule TAKE ONE CAPSULE BY MOUTH EVERY DAY, Starting Tue 11/12/2024, Normal           No discharge procedures on file.  ED SEPSIS DOCUMENTATION   Time reflects when diagnosis was documented in both MDM as applicable and the Disposition within this note       Time User Action Codes Description Comment    1/11/2025  1:17 PM Aye Bradley [N20.1] Left ureteral stone     1/11/2025  1:18 PM Aye Bradley [K52.9] Colitis                  Aye Bradley PA-C  01/11/25 1939

## 2025-01-11 NOTE — DISCHARGE INSTRUCTIONS
Use Tylenol every 4 hours or Ibuprofen every 6 hours; you can alternate the 2 medications taking something every 3 hours for pain.  If no improvement you can use Percocet  Use Zofran as needed for nausea vomiting  Take all Flomax until done.  ]    Follow-up with your doctor.   Follow-up with Urologist.

## 2025-01-12 LAB
ATRIAL RATE: 68 BPM
P AXIS: 50 DEGREES
PR INTERVAL: 142 MS
QRS AXIS: -33 DEGREES
QRSD INTERVAL: 92 MS
QT INTERVAL: 398 MS
QTC INTERVAL: 423 MS
T WAVE AXIS: 96 DEGREES
VENTRICULAR RATE: 68 BPM

## 2025-01-12 PROCEDURE — 93010 ELECTROCARDIOGRAM REPORT: CPT | Performed by: INTERNAL MEDICINE

## 2025-01-13 ENCOUNTER — OFFICE VISIT (OUTPATIENT)
Dept: GASTROENTEROLOGY | Facility: CLINIC | Age: 75
End: 2025-01-13
Payer: COMMERCIAL

## 2025-01-13 VITALS
HEIGHT: 65 IN | HEART RATE: 76 BPM | WEIGHT: 195 LBS | BODY MASS INDEX: 32.49 KG/M2 | DIASTOLIC BLOOD PRESSURE: 78 MMHG | SYSTOLIC BLOOD PRESSURE: 120 MMHG

## 2025-01-13 DIAGNOSIS — K21.9 LARYNGOPHARYNGEAL REFLUX: ICD-10-CM

## 2025-01-13 DIAGNOSIS — R10.13 EPIGASTRIC PAIN: ICD-10-CM

## 2025-01-13 DIAGNOSIS — K21.9 GASTROESOPHAGEAL REFLUX DISEASE, UNSPECIFIED WHETHER ESOPHAGITIS PRESENT: Primary | ICD-10-CM

## 2025-01-13 PROCEDURE — 99214 OFFICE O/P EST MOD 30 MIN: CPT | Performed by: INTERNAL MEDICINE

## 2025-01-13 RX ORDER — FAMOTIDINE 20 MG/1
20 TABLET, FILM COATED ORAL 2 TIMES DAILY
Qty: 180 TABLET | Refills: 5 | Status: SHIPPED | OUTPATIENT
Start: 2025-01-13

## 2025-01-13 NOTE — PROGRESS NOTES
Name: Renay Herrera      : 1950      MRN: [id number]  Encounter Provider: Richy Loredo MD  Encounter Date: 2025   Encounter department: [facility] GASTRO[facility]OLOGY [place] [address]  :  Assessment & Plan  Gastroesophageal reflux disease, unspecified whether esophagitis present  History of chronic GERD LPR like symptoms mild discomfort upper abdomen doing well on famotidine 20 mg twice a day continue same prescription renewed.  Encouraged her to eat smaller portions and try to lose some weight.  Orders:  •  famotidine (PEPCID) 20 mg tablet; Take 1 tablet (20 mg total) by mouth 2 (two) times a day    Laryngopharyngeal reflux         Epigastric pain    Orders:  •  famotidine (PEPCID) 20 mg tablet; Take 1 tablet (20 mg total) by mouth 2 (two) times a day      Patient had a colonoscopy 5 years ago at [facility] was given 10 years ago she recently had a Cologuard test, await those results.  Otherwise follow-up with us as needed.  History of Present Illness   HPI  Renay Herrera is a 74 y.o. female who presents with history of heartburn acid reflux indigestion doing well on famotidine 20 mg twice a day, denies any dysphagia coughing choking spells chest pain shortness of breath fever chills rash, appetite is fair weight stable.  Bowels are regular, trying to be reasonably active.  Gained some weight during the holidays.  Denies any blood in stools melena hematochezia.  According to her she had a colonoscopy done 5 years ago at [facility] was given 10-year follow-up those records not available.  She had a Cologuard stool test done a week ago which is not reported yet.  Continue general care prescription renewed.  She will call us if Cologuard results shows positive.  Otherwise continue same discussed with patient and her .      Review of Systems   All other systems reviewed and are negative.         Objective   /78 (Patient Position: Sitting, Cuff Size: Standard)   Pulse 76   Ht  "5' 5\" (1.651 m)   Wt 88.5 kg (195 lb)   BMI 32.45 kg/m²      Physical Exam  Constitutional:       General: She is not in acute distress.     Appearance: She is normal weight. She is not ill-appearing.   HENT:      Mouth/Throat:      Mouth: Mucous membranes are moist.   Cardiovascular:      Rate and Rhythm: Normal rate.   Pulmonary:      Effort: No respiratory distress.      Breath sounds: No stridor. No wheezing or rales.   Abdominal:      General: Bowel sounds are normal. There is no distension.      Palpations: There is no mass.      Tenderness: There is no abdominal tenderness. There is no guarding or rebound.      Hernia: No hernia is present.   Skin:     Coloration: Skin is not jaundiced or pale.   Neurological:      Mental Status: She is oriented to person, place, and time.           "

## 2025-01-13 NOTE — ASSESSMENT & PLAN NOTE
History of chronic GERD LPR like symptoms mild discomfort upper abdomen doing well on famotidine 20 mg twice a day continue same prescription renewed.  Encouraged her to eat smaller portions and try to lose some weight.  Orders:  •  famotidine (PEPCID) 20 mg tablet; Take 1 tablet (20 mg total) by mouth 2 (two) times a day

## 2025-01-15 DIAGNOSIS — M47.816 LUMBAR SPONDYLOSIS: Primary | ICD-10-CM

## 2025-01-15 NOTE — PROGRESS NOTES
80% relief after MBB 1  Will proceed with MBB # 2      The risks of the procedure were discussed in detail.  These risks include infection, increased pain, paralysis, bleeding.  Patient understands the risks and is willing to pursue with the procedure

## 2025-01-18 LAB — COLOGUARD RESULT REPORTABLE: NEGATIVE

## 2025-01-20 ENCOUNTER — OFFICE VISIT (OUTPATIENT)
Dept: PAIN MEDICINE | Facility: CLINIC | Age: 75
End: 2025-01-20
Payer: COMMERCIAL

## 2025-01-20 VITALS — WEIGHT: 195 LBS | BODY MASS INDEX: 32.49 KG/M2 | HEIGHT: 65 IN

## 2025-01-20 DIAGNOSIS — M47.816 LUMBAR SPONDYLOSIS: Primary | ICD-10-CM

## 2025-01-20 DIAGNOSIS — Z98.890 HISTORY OF LUMBOSACRAL SPINE SURGERY: ICD-10-CM

## 2025-01-20 PROCEDURE — 99214 OFFICE O/P EST MOD 30 MIN: CPT | Performed by: PAIN MEDICINE

## 2025-01-20 NOTE — PROGRESS NOTES
Assessment  1. Lumbar spondylosis  -     diclofenac sodium (VOLTAREN) 50 mg EC tablet; Take 1 tablet (50 mg total) by mouth 2 (two) times a day  2. History of lumbosacral spine surgery  -     diclofenac sodium (VOLTAREN) 50 mg EC tablet; Take 1 tablet (50 mg total) by mouth 2 (two) times a day            74 female status post lumbar fusion L4-5 4 years prior [facility] with persistent low back pain ongoing for 1 year axial in nature she has tenderness palpation lumbar facets at L5-S1 as well as tenderness palpation of the sacroiliac joints.      She did well bilateral L45 MBB # 1, will continue with MBB # 2    Start diclofenac 50 mg bid  D/c naproxen    If successful after II nerve blocks we will proceed with nerve ablation.  Consider caudal epidural            My impressions and treatment recommendations were discussed in detail with the patient who verbalized understanding and had no further questions.  Discharge instructions were provided. I personally saw and examined the patient and I agree with the above discussed plan of care.    Plan      New Medications Ordered This Visit   Medications   • diclofenac sodium (VOLTAREN) 50 mg EC tablet     Sig: Take 1 tablet (50 mg total) by mouth 2 (two) times a day     Dispense:  60 tablet     Refill:  1         No orders of the defined types were placed in this encounter.      History of Present Illness  Follow-up 1/20/2025  Renay comes for follow-up status post medial branch block bilateral L4-5 completed 12/5/2024 she had 80% reduction in pain for 6 hours.  She is not getting benefit from naproxen at this time.  Still working now not effective.      Follow-up 10/30/2024  Renay comes for follow-up reporting her back pain is 7-10 aching throbbing the lower back with activity such as bending twisting turning.  She completed MRI of her lumbar spine here to review.  Pain is currently 7 out of 10.        Consult  Renay Herrera is a 74 y.o. female with relevant PMH of  acid reflux thrombocytopenia history of L4-5 fusion with symptoms ongoing for the past 1 year moderate-severe intensity in terms of pain pain is constant affecting her symptoms all the time she feels a 30 to 60% of the time pressure like pain always hurting with activities that radiate to the lower extremities with weakness she is on therapy 6 weeks last 6 months no bowel bladder incontinence she is not on blood thinners no allergies to contrast dye.      I have personally reviewed and/or updated the patient's past medical history, past surgical history, family history, social history, current medications, allergies, and vital signs today.     Review of Systems   Musculoskeletal:  Positive for arthralgias, joint swelling and myalgias.   All other systems reviewed and are negative.      Patient Active Problem List   Diagnosis   • Hypothyroidism   • Current moderate episode of major depressive disorder without prior episode (HCC)   • GERD (gastroesophageal reflux disease)   • History of hip joint replacement by other means   • History of total knee arthroplasty, right   • Laryngopharyngeal reflux   • History of pulmonary embolism   • History of lumbosacral spine surgery   • Bipolar affective disorder, currently depressed, mild (HCC)   • Class 2 obesity without serious comorbidity in adult   • Small B-cell lymphoma (HCC)   • Sacroiliitis, not elsewhere classified (HCC)   • Thrombocytopenia (HCC)   • High ankle sprain of left lower extremity   • Right ureteral stone with hydronephrosis   • Hydronephrosis of right kidney   • Anxiety   • Lumbar spondylosis       Past Medical History:   Diagnosis Date   • Allergic    • Anxiety    • Cancer (HCC) Cant remember   • Depression 20 years ago   • Disease of thyroid gland    • GERD (gastroesophageal reflux disease)    • H/O bilateral hip replacements    • Kidney stone April 2023   • Non Hodgkin's lymphoma (HCC)        Past Surgical History:   Procedure Laterality Date   • BACK  SURGERY      lumbar spine   • COLONOSCOPY      Unk   • FL RETROGRADE PYELOGRAM  04/11/2023   • HIP SURGERY Bilateral    • JOINT REPLACEMENT     • KNEE SURGERY Right    • PA CYSTO/URETERO W/LITHOTRIPSY &INDWELL STENT INSRT Right 04/11/2023    Procedure: CYSTOSCOPY URETEROSCOPY WITH RETROGRADE PYELOGRAM AND INSERTION STENT URETERAL, STONE BASKET EXTRACTION;  Surgeon: Hung Lau MD;  Location: [facility];  Service: Urology   • TOTAL SHOULDER REPLACEMENT Right    • TUBAL LIGATION         Family History   Problem Relation Age of Onset   • Lymphoma Mother    • Cancer Mother    • Breast cancer Mother    • ADD / ADHD Father    • Arthritis Father    • No Known Problems Daughter    • No Known Problems Daughter    • No Known Problems Maternal Grandmother    • No Known Problems Maternal Grandfather    • No Known Problems Paternal Grandmother    • No Known Problems Paternal Grandfather    • No Known Problems Brother    • Kidney disease Maternal Uncle         2nd kidney transplant in [place]       Social History     Occupational History   • Not on file   Tobacco Use   • Smoking status: Never     Passive exposure: Never   • Smokeless tobacco: Never   Vaping Use   • Vaping status: Never Used   Substance and Sexual Activity   • Alcohol use: Not Currently     Alcohol/week: 5.0 standard drinks of alcohol     Types: 5 Glasses of wine per week     Comment: wine   • Drug use: Never   • Sexual activity: Not Currently     Partners: Male     Birth control/protection: Spermicide, Female Sterilization, None       Current Outpatient Medications on File Prior to Visit   Medication Sig   • dicyclomine (BENTYL) 10 mg capsule Take 1 capsule  by mouth daily   • divalproex sodium (DEPAKOTE ER) 500 mg 24 hr tablet Take 1 tablet by mouth daily   • famotidine (PEPCID) 20 mg tablet Take 1 tablet (20 mg total) by mouth 2 (two) times a day   • levothyroxine 75 mcg tablet TAKE ONE TABLET BY MOUTH IN THE EARLY MORNING   • multivitamin (THERAGRAN) TABS  "Take 1 tablet by mouth daily   • ondansetron (ZOFRAN-ODT) 4 mg disintegrating tablet Take 1 tablet (4 mg total) by mouth every 6 (six) hours as needed for nausea or vomiting   • pantoprazole (PROTONIX) 40 mg tablet Take 1 tablet by mouth daily before breakfast   • traZODone (DESYREL) 50 mg tablet Take 1 tablet by mouth daily at bedtime as needed for sleep   • venlafaxine (EFFEXOR-XR) 150 mg 24 hr capsule TAKE ONE CAPSULE BY MOUTH EVERY DAY   • [DISCONTINUED] naproxen (NAPROSYN) 500 mg tablet Take 1 tablet by mouth 2  times a day with meals   • CALCIUM PO Take by mouth (Patient not taking: Reported on 1/13/2025)   • tamsulosin (FLOMAX) 0.4 mg Take 1 capsule (0.4 mg total) by mouth daily with dinner (Patient not taking: Reported on 1/13/2025)   • topiramate (TOPAMAX) 50 MG tablet TAKE 1 TABLET BY MOUTH TWICE A DAY (Patient not taking: Reported on 1/13/2025)     No current facility-administered medications on file prior to visit.       Allergies   Allergen Reactions   • Clindamycin Hives         Physical Exam    Ht 5' 5\" (1.651 m)   Wt 88.5 kg (195 lb)   BMI 32.45 kg/m²           MSK:    Lumbar Spine:  No masses or atrophy,    Range of motion - Decreased extension/flexion  Palpation -  Tenderness to palpation in the lumbar parapsinals   PSIS tenderness + bilaterally  Vel's/FEDE + ibilatearlly  Gaenslen's + bilaterally  SLR neg       Strength Right Left   Hip flexion L1,2 5 5   Knee extension L3 5 5   Ankle dorsiflexion L4 5 5   Great toe extension L5 5 5   Ankle Plantarflexion S1 5 5       Sensory Exam:  intact to light touch bilateral LE       Reflexes:     Right Left   Biceps 2+ 2+   Triceps 2+ 2+   Brachioradialis 2+ 2+   Patellar 2+ 2+   Achilles 2+ 2+   Babinski neg neg        Gait normal                  Imaging  X-ray lumbar spine 2024 September  L1 compression fracture  L4-5 instrumented fusion with adjacent segment degeneration at L5-S1    MRI lumbar spine  L3-4 moderate stenosis  L5-S1 central disc " protrusion bilateral neuroforaminal narrowing  Facet arthropathy L5-S1 and L4-5

## 2025-01-22 ENCOUNTER — RESULTS FOLLOW-UP (OUTPATIENT)
Dept: OTHER | Facility: HOSPITAL | Age: 75
End: 2025-01-22

## 2025-01-23 ENCOUNTER — TELEPHONE (OUTPATIENT)
Dept: OBGYN CLINIC | Facility: MEDICAL CENTER | Age: 75
End: 2025-01-23

## 2025-01-23 DIAGNOSIS — Z98.890 HISTORY OF LUMBOSACRAL SPINE SURGERY: ICD-10-CM

## 2025-01-23 DIAGNOSIS — M47.816 LUMBAR SPONDYLOSIS: ICD-10-CM

## 2025-01-23 NOTE — TELEPHONE ENCOUNTER
Patient came into the office to find out where the meds were sent  It appears it was sent to the Mail order (If the mail order is used, it needs to be written for 90 day supply and it takes 7 days for her to get)    She asked if the first one since it's for 30 days be sent to [facility] in [place]       Patient Call back # [phone]

## 2025-02-04 ENCOUNTER — OFFICE VISIT (OUTPATIENT)
Dept: FAMILY MEDICINE CLINIC | Facility: CLINIC | Age: 75
End: 2025-02-04
Payer: COMMERCIAL

## 2025-02-04 VITALS
SYSTOLIC BLOOD PRESSURE: 110 MMHG | TEMPERATURE: 98.1 F | OXYGEN SATURATION: 95 % | BODY MASS INDEX: 33.32 KG/M2 | WEIGHT: 200 LBS | RESPIRATION RATE: 16 BRPM | HEART RATE: 63 BPM | DIASTOLIC BLOOD PRESSURE: 80 MMHG | HEIGHT: 65 IN

## 2025-02-04 DIAGNOSIS — J01.10 SUBACUTE FRONTAL SINUSITIS: ICD-10-CM

## 2025-02-04 DIAGNOSIS — J06.9 URI WITH COUGH AND CONGESTION: Primary | ICD-10-CM

## 2025-02-04 PROCEDURE — 99213 OFFICE O/P EST LOW 20 MIN: CPT | Performed by: NURSE PRACTITIONER

## 2025-02-04 RX ORDER — PREDNISONE 20 MG/1
20 TABLET ORAL DAILY
Qty: 5 TABLET | Refills: 0 | Status: SHIPPED | OUTPATIENT
Start: 2025-02-04 | End: 2025-02-09

## 2025-02-04 RX ORDER — BENZONATATE 100 MG/1
100 CAPSULE ORAL 3 TIMES DAILY PRN
Qty: 20 CAPSULE | Refills: 0 | Status: SHIPPED | OUTPATIENT
Start: 2025-02-04

## 2025-02-04 RX ORDER — AMOXICILLIN 875 MG/1
875 TABLET, COATED ORAL 2 TIMES DAILY
Qty: 14 TABLET | Refills: 0 | Status: SHIPPED | OUTPATIENT
Start: 2025-02-04 | End: 2025-02-11

## 2025-02-04 NOTE — PROGRESS NOTES
Name: Renay Herrera      : 1950      MRN: [id number]  Encounter Provider: JOANNE Mares  Encounter Date: 2025   Encounter department: [facility]    Assessment & Plan  URI with cough and congestion    Orders:    predniSONE 20 mg tablet; Take 1 tablet (20 mg total) by mouth daily for 5 days    benzonatate (TESSALON PERLES) 100 mg capsule; Take 1 capsule (100 mg total) by mouth 3 (three) times a day as needed for cough    Subacute frontal sinusitis    Orders:    amoxicillin (AMOXIL) 875 mg tablet; Take 1 tablet (875 mg total) by mouth 2 (two) times a day for 7 days    predniSONE 20 mg tablet; Take 1 tablet (20 mg total) by mouth daily for 5 days  Uncomplicated upper respiratory.  Possibly sinusitis will treat accordingly.  Dosing all possible side effects of the prescribed medications or medications that had been prescribed in the past were reviewed and all questions were answered.  Patient verbalized agreement and understanding of the plan of care as outlined during the office visit today return to office as indicated or sooner if a problem arises.         History of Present Illness       Patient is here with a complaint of upper respiratory tract discomfort has had a cough runny nose and some nasal congestion.  Denies any need nausea vomiting diarrhea chest pains or shortness of breath.  All over-the-counter medication attempted arm were unsuccessful.          Review of Systems   Constitutional:  Positive for fatigue. Negative for chills.   HENT:  Positive for congestion, rhinorrhea, sinus pressure and sinus pain. Negative for sore throat.    Respiratory:  Positive for cough.      Past Medical History:   Diagnosis Date    Allergic     Anxiety     Cancer (HCC) Cant remember    Depression 20 years ago    Disease of thyroid gland     GERD (gastroesophageal reflux disease)     H/O bilateral hip replacements     Kidney stone 2023    Non Hodgkin's lymphoma (HCC)       Past Surgical History:   Procedure Laterality Date    BACK SURGERY      lumbar spine    COLONOSCOPY      Unk    FL RETROGRADE PYELOGRAM  04/11/2023    HIP SURGERY Bilateral     JOINT REPLACEMENT      KNEE SURGERY Right     [place] CYSTO/URETERO W/LITHOTRIPSY &INDWELL STENT INSRT Right 04/11/2023    Procedure: CYSTOSCOPY URETEROSCOPY WITH RETROGRADE PYELOGRAM AND INSERTION STENT URETERAL, STONE BASKET EXTRACTION;  Surgeon: Hung Lau MD;  [facility];  Service: Urology    TOTAL SHOULDER REPLACEMENT Right     TUBAL LIGATION       Family History   Problem Relation Age of Onset    Lymphoma Mother     Cancer Mother     Breast cancer Mother     ADD / ADHD Father     Arthritis Father     No Known Problems Daughter     No Known Problems Daughter     No Known Problems Maternal Grandmother     No Known Problems Maternal Grandfather     No Known Problems Paternal Grandmother     No Known Problems Paternal Grandfather     No Known Problems Brother     Kidney disease Maternal Uncle         2nd kidney transplant in [place]     Social History     Tobacco Use    Smoking status: Never     Passive exposure: Never    Smokeless tobacco: Never   Vaping Use    Vaping status: Never Used   Substance and Sexual Activity    Alcohol use: Not Currently     Alcohol/week: 5.0 standard drinks of alcohol     Types: 5 Glasses of wine per week     Comment: wine    Drug use: Never    Sexual activity: Not Currently     Partners: Male     Birth control/protection: Spermicide, Female Sterilization, None     Current Outpatient Medications on File Prior to Visit   Medication Sig    diclofenac sodium (VOLTAREN) 50 mg EC tablet Take 1 tablet (50 mg total) by mouth 2 (two) times a day    dicyclomine (BENTYL) 10 mg capsule Take 1 capsule  by mouth daily    divalproex sodium (DEPAKOTE ER) 500 mg 24 hr tablet Take 1 tablet by mouth daily    famotidine (PEPCID) 20 mg tablet Take 1 tablet (20 mg total) by mouth 2 (two) times a day    levothyroxine 75  mcg tablet TAKE ONE TABLET BY MOUTH IN THE EARLY MORNING    multivitamin (THERAGRAN) TABS Take 1 tablet by mouth daily    traZODone (DESYREL) 50 mg tablet Take 1 tablet by mouth daily at bedtime as needed for sleep    venlafaxine (EFFEXOR-XR) 150 mg 24 hr capsule TAKE ONE CAPSULE BY MOUTH EVERY DAY    CALCIUM PO Take by mouth (Patient not taking: Reported on 1/13/2025)    ondansetron (ZOFRAN-ODT) 4 mg disintegrating tablet Take 1 tablet (4 mg total) by mouth every 6 (six) hours as needed for nausea or vomiting (Patient not taking: Reported on 2/4/2025)    pantoprazole (PROTONIX) 40 mg tablet Take 1 tablet by mouth daily before breakfast (Patient not taking: Reported on 2/4/2025)    tamsulosin (FLOMAX) 0.4 mg Take 1 capsule (0.4 mg total) by mouth daily with dinner (Patient not taking: Reported on 1/13/2025)    topiramate (TOPAMAX) 50 MG tablet TAKE 1 TABLET BY MOUTH TWICE A DAY (Patient not taking: Reported on 1/13/2025)     Allergies   Allergen Reactions    Clindamycin Hives     Immunization History   Administered Date(s) Administered    COVID-19 MODERNA VACC 0.5 ML IM 01/28/2021, 03/05/2021, 11/10/2021    COVID-19 Moderna Vac BIVALENT 12 Yr+ IM 0.5 ML 11/04/2022    COVID-19 Moderna mRNA Vaccine 12 Yr+ 50 mcg/0.5 mL (Spikevax) 12/01/2023    COVID-19 Pfizer mRNA vacc PF mayco-sucrose 12 yr and older (Comirnaty) 10/12/2024    H1N1 Inj 02/16/2010    INFLUENZA 10/20/2022    Influenza Quadrivalent Preservative Free 3 years and older IM 11/18/2015, 10/12/2016, 10/13/2017, 11/08/2018    Influenza Split High Dose Preservative Free IM 10/12/2024    Influenza, Seasonal Vaccine, Quadrivalent, Adjuvanted, .5e 10/10/2023    Influenza, high dose seasonal 0.7 mL 10/24/2021, 10/20/2022    Influenza, seasonal, injectable 10/24/2006, 10/26/2007, 11/12/2008, 10/15/2009, 10/12/2010, 11/22/2011, 12/13/2012, 11/14/2013, 10/22/2014    Pneumococcal Conjugate 13-Valent 11/18/2015    Pneumococcal Polysaccharide PPV23 01/01/2004, 11/18/2014  "   Respiratory Syncytial Virus Vaccine (Recombinant, Adjuvanted) 12/01/2023    TD (adult) Preservative Free 12/07/2017    Tdap 07/27/2007, 10/21/2019    Zoster Vaccine Recombinant 12/04/2018, 10/28/2019, 02/04/2020    influenza, trivalent, adjuvanted 10/21/2019     Objective   /80 (BP Location: Left arm, Patient Position: Sitting, Cuff Size: Large)   Pulse 63   Temp 98.1 °F (36.7 °C) (Temporal)   Resp 16   Ht 5' 5\" (1.651 m)   Wt 90.7 kg (200 lb)   SpO2 95%   BMI 33.28 kg/m²     Physical Exam  HENT:      Nose: Congestion and rhinorrhea present.   Cardiovascular:      Rate and Rhythm: Normal rate and regular rhythm.      Heart sounds: Normal heart sounds.   Pulmonary:      Effort: Pulmonary effort is normal.      Breath sounds: Normal breath sounds.         "

## 2025-02-07 ENCOUNTER — TELEPHONE (OUTPATIENT)
Age: 75
End: 2025-02-07

## 2025-02-07 NOTE — TELEPHONE ENCOUNTER
Pt's appt needs to be rescheduled d/t provider not being in the office. Attempted to reach patient about need of appointment change with no success. Appointment reschedule and detailed VM left with [facility] number [phone] for patient to return call.    Additional message sent Via [employer].

## 2025-02-20 ENCOUNTER — HOSPITAL ENCOUNTER (OUTPATIENT)
Dept: RADIOLOGY | Facility: HOSPITAL | Age: 75
Discharge: HOME | End: 2025-02-20
Attending: PAIN MEDICINE
Payer: COMMERCIAL

## 2025-02-20 ENCOUNTER — RA CDI HCC (OUTPATIENT)
Dept: OTHER | Facility: HOSPITAL | Age: 75
End: 2025-02-20

## 2025-02-20 VITALS
DIASTOLIC BLOOD PRESSURE: 66 MMHG | OXYGEN SATURATION: 99 % | RESPIRATION RATE: 17 BRPM | HEART RATE: 62 BPM | SYSTOLIC BLOOD PRESSURE: 145 MMHG | TEMPERATURE: 97.1 F

## 2025-02-20 DIAGNOSIS — M47.816 LUMBAR SPONDYLOSIS: ICD-10-CM

## 2025-02-20 PROCEDURE — 64494 INJ PARAVERT F JNT L/S 2 LEV: CPT | Performed by: PAIN MEDICINE

## 2025-02-20 PROCEDURE — 64493 INJ PARAVERT F JNT L/S 1 LEV: CPT | Performed by: PAIN MEDICINE

## 2025-02-20 RX ORDER — BUPIVACAINE HYDROCHLORIDE 5 MG/ML
3 INJECTION, SOLUTION EPIDURAL; INTRACAUDAL ONCE
Status: COMPLETED | OUTPATIENT
Start: 2025-02-20 | End: 2025-02-20

## 2025-02-20 RX ADMIN — BUPIVACAINE HYDROCHLORIDE 3 ML: 5 INJECTION, SOLUTION EPIDURAL; INTRACAUDAL; PERINEURAL at 10:10

## 2025-02-20 NOTE — H&P
History of Present Illness: The patient is a 74 y.o. female who presents with complaints of low back pain    Past Medical History:   Diagnosis Date    Allergic     Anxiety     Cancer (HCC) Cant remember    Depression 20 years ago    Disease of thyroid gland     GERD (gastroesophageal reflux disease)     H/O bilateral hip replacements     Kidney stone April 2023    Non Hodgkin's lymphoma (HCC)        Past Surgical History:   Procedure Laterality Date    BACK SURGERY      lumbar spine    COLONOSCOPY      Unk    FL RETROGRADE PYELOGRAM  04/11/2023    HIP SURGERY Bilateral     JOINT REPLACEMENT      KNEE SURGERY Right     [place] CYSTO/URETERO W/LITHOTRIPSY &INDWELL STENT INSRT Right 04/11/2023    Procedure: CYSTOSCOPY URETEROSCOPY WITH RETROGRADE PYELOGRAM AND INSERTION STENT URETERAL, STONE BASKET EXTRACTION;  Surgeon: Hung Lau MD;  [facility];  Service: Urology    TOTAL SHOULDER REPLACEMENT Right     TUBAL LIGATION           Current Outpatient Medications:     benzonatate (TESSALON PERLES) 100 mg capsule, Take 1 capsule (100 mg total) by mouth 3 (three) times a day as needed for cough, Disp: 20 capsule, Rfl: 0    CALCIUM PO, Take by mouth (Patient not taking: Reported on 1/13/2025), Disp: , Rfl:     diclofenac sodium (VOLTAREN) 50 mg EC tablet, Take 1 tablet (50 mg total) by mouth 2 (two) times a day, Disp: 60 tablet, Rfl: 1    dicyclomine (BENTYL) 10 mg capsule, Take 1 capsule  by mouth daily, Disp: 90 capsule, Rfl: 1    divalproex sodium (DEPAKOTE ER) 500 mg 24 hr tablet, Take 1 tablet by mouth daily, Disp: 100 tablet, Rfl: 1    famotidine (PEPCID) 20 mg tablet, Take 1 tablet (20 mg total) by mouth 2 (two) times a day, Disp: 180 tablet, Rfl: 5    levothyroxine 75 mcg tablet, TAKE ONE TABLET BY MOUTH IN THE EARLY MORNING, Disp: 90 tablet, Rfl: 1    multivitamin (THERAGRAN) TABS, Take 1 tablet by mouth daily, Disp: , Rfl:     ondansetron (ZOFRAN-ODT) 4 mg disintegrating tablet, Take 1 tablet (4 mg total)  by mouth every 6 (six) hours as needed for nausea or vomiting (Patient not taking: Reported on 2/4/2025), Disp: 10 tablet, Rfl: 0    pantoprazole (PROTONIX) 40 mg tablet, Take 1 tablet by mouth daily before breakfast (Patient not taking: Reported on 2/4/2025), Disp: 90 tablet, Rfl: 1    tamsulosin (FLOMAX) 0.4 mg, Take 1 capsule (0.4 mg total) by mouth daily with dinner (Patient not taking: Reported on 1/13/2025), Disp: 7 capsule, Rfl: 0    topiramate (TOPAMAX) 50 MG tablet, TAKE 1 TABLET BY MOUTH TWICE A DAY (Patient not taking: Reported on 1/13/2025), Disp: 180 tablet, Rfl: 1    traZODone (DESYREL) 50 mg tablet, Take 1 tablet by mouth daily at bedtime as needed for sleep, Disp: 90 tablet, Rfl: 1    venlafaxine (EFFEXOR-XR) 150 mg 24 hr capsule, TAKE ONE CAPSULE BY MOUTH EVERY DAY, Disp: 90 capsule, Rfl: 1    Allergies   Allergen Reactions    Clindamycin Hives       Physical Exam:   Vitals:    02/20/25 0955   BP: 128/73   Pulse: 56   Resp: 17   Temp: (!) 97.1 °F (36.2 °C)   SpO2: 99%     General: Awake, Alert, Oriented x 3, Mood and affect appropriate  Respiratory: Respirations even and unlabored  Cardiovascular: Peripheral pulses intact; no edema  Musculoskeletal Exam: TTP low back    ASA Score: 1    Patient/Chart Verification  Patient ID Verified: Verbal  ID Band Applied: No  H&P( within 30 days) Verified: To be obtained in the Procedural area  Allergies Reviewed: Yes  Anticoag/NSAID held?: NA  Currently on antibiotics?: No    Assessment:   1. Lumbar spondylosis        Plan: bialteral L45 MBB #2

## 2025-02-20 NOTE — DISCHARGE INSTR - LAB

## 2025-02-24 ENCOUNTER — TELEPHONE (OUTPATIENT)
Dept: HEMATOLOGY ONCOLOGY | Facility: CLINIC | Age: 75
End: 2025-02-24

## 2025-02-24 NOTE — TELEPHONE ENCOUNTER
Called PT to try to reschedule appt with Dr. Holman on 3/20 and reschedule with Dr. Diaz same day.  I LVM with PT and explained that I would need to reschedule as Dr. Holman has a change in Schedule. I offered to go back and see Dr. Diaz and that he is only in [place].  If PT is ok with it we could reschd same day different time in [place].  Left [facility] number and to please call back if that is ok.

## 2025-02-25 ENCOUNTER — OFFICE VISIT (OUTPATIENT)
Dept: FAMILY MEDICINE CLINIC | Facility: CLINIC | Age: 75
End: 2025-02-25
Payer: COMMERCIAL

## 2025-02-25 VITALS
OXYGEN SATURATION: 98 % | DIASTOLIC BLOOD PRESSURE: 86 MMHG | HEART RATE: 61 BPM | HEIGHT: 65 IN | BODY MASS INDEX: 32.82 KG/M2 | RESPIRATION RATE: 16 BRPM | WEIGHT: 197 LBS | SYSTOLIC BLOOD PRESSURE: 118 MMHG | TEMPERATURE: 97.4 F

## 2025-02-25 DIAGNOSIS — M47.816 LUMBAR SPONDYLOSIS: Primary | ICD-10-CM

## 2025-02-25 DIAGNOSIS — K21.9 GASTROESOPHAGEAL REFLUX DISEASE WITHOUT ESOPHAGITIS: ICD-10-CM

## 2025-02-25 DIAGNOSIS — R05.9 COUGH, UNSPECIFIED TYPE: Primary | ICD-10-CM

## 2025-02-25 DIAGNOSIS — F31.31 BIPOLAR AFFECTIVE DISORDER, CURRENTLY DEPRESSED, MILD (HCC): ICD-10-CM

## 2025-02-25 PROCEDURE — 99213 OFFICE O/P EST LOW 20 MIN: CPT | Performed by: NURSE PRACTITIONER

## 2025-02-25 RX ORDER — AZITHROMYCIN 250 MG/1
TABLET, FILM COATED ORAL
Qty: 6 TABLET | Refills: 0 | Status: SHIPPED | OUTPATIENT
Start: 2025-02-25 | End: 2025-03-01

## 2025-02-25 NOTE — PROGRESS NOTES
Renay is s/p bilateral L4-5 Mbb # 2, with 80% relief in back pain  Will proceed with nerve ablation

## 2025-02-25 NOTE — PROGRESS NOTES
":  Assessment & Plan  Cough, unspecified type    Orders:    azithromycin (Zithromax) 250 mg tablet; Take 2 tablets (500 mg total) by mouth daily for 1 day, THEN 1 tablet (250 mg total) daily for 4 days.    Lingering residual cough.  Vital signs are perfect will treat empirically with azithromycin return to office if fails to improve.  Dosing all possible side effects of the prescribed medications or medications that had been prescribed in the past were reviewed and all questions were answered.  Patient verbalized agreement and understanding of the plan of care as outlined during the office visit today return to office as indicated or sooner if a problem arises.      History of Present Illness     Renay Herrera is a 74 y.o. female     Patient is here with a complaint of upper respiratory tract discomfort has had a cough runny nose and some nasal congestion.  Denies any need nausea vomiting diarrhea chest pains or shortness of breath.  All over-the-counter medication attempted arm were unsuccessful.            Review of Systems   Constitutional:  Negative for chills and fever.   HENT:  Negative for congestion, rhinorrhea and sore throat.    Respiratory:  Positive for cough. Negative for shortness of breath.    Cardiovascular:  Negative for chest pain.   Genitourinary:  Negative for dysuria, frequency and urgency.     Objective   /86 (BP Location: Left arm, Patient Position: Sitting, Cuff Size: Large)   Pulse 61   Temp (!) 97.4 °F (36.3 °C) (Temporal)   Resp 16   Ht 5' 5\" (1.651 m)   Wt 89.4 kg (197 lb)   SpO2 98%   BMI 32.78 kg/m²      Physical Exam  Cardiovascular:      Rate and Rhythm: Normal rate and regular rhythm.      Heart sounds: Normal heart sounds.   Pulmonary:      Effort: Pulmonary effort is normal.      Breath sounds: Normal breath sounds.           "

## 2025-02-26 RX ORDER — PANTOPRAZOLE SODIUM 40 MG/1
40 TABLET, DELAYED RELEASE ORAL
Qty: 90 TABLET | Refills: 1 | Status: SHIPPED | OUTPATIENT
Start: 2025-02-26

## 2025-02-26 RX ORDER — DIVALPROEX SODIUM 500 MG/1
500 TABLET, FILM COATED, EXTENDED RELEASE ORAL DAILY
Qty: 100 TABLET | Refills: 0 | Status: SHIPPED | OUTPATIENT
Start: 2025-02-26

## 2025-03-04 NOTE — PROGRESS NOTES
Name: Renay Herrera      : 1950      MRN: [id number]  Encounter Provider: Jamison May MD  Encounter Date: 3/5/2025   Encounter department: [facility]  :  Assessment & Plan  Well adult exam         Anxiety  Patient to continue utilizing medical therapy as well as counseling sources as applicable to condition. If suicidal thought or fear of suicide attempt, to call 911 and seek help immediately. Medications and therapy reviewed today and all patient  questions answered today.        Class 2 obesity without serious comorbidity in adult, unspecified BMI, unspecified obesity type  Patient to increase exercise and partake of a diet with less calories to promote  weight loss        Gastroesophageal reflux disease, unspecified whether esophagitis present  Patient to continue with present therapy and decrease caffeine, avoid ETOH and smoking to decrease acid production. Pt should also cease eating prior to bedtime and avoid excessive fluid intake prior to sleep. May use antacids as needed for breakthrough GERD. All pateint questions answered today about this condition.        Hypothyroidism due to acquired atrophy of thyroid  Patient to continue current dose of thyroid medicine and recheck TSH in 6 months        Small B-cell lymphoma, unspecified body region (HCC)  Patient is stable  and will continue present plan of care and reassess at next routine visit. All questions about this problem from patient were answered today.        Class 2 obesity due to excess calories without serious comorbidity with body mass index (BMI) of 35.0 to 35.9 in adult    Orders:  •  topiramate (TOPAMAX) 100 mg tablet; Take 1 tablet (100 mg total) by mouth 2 (two) times a day          Depression Screening and Follow-up Plan:   Continue regular follow-up with their mental health provider who is managing their mental health condition(s). Patient advised to follow-up with PCP for further management.      Falls Plan of Care: balance, strength, and gait training instructions were provided. Home safety education provided.     Urinary Incontinence Plan of Care: counseling topics discussed: practice Kegel (pelvic floor strengthening) exercises, use restroom every 2 hours, limit alcohol, caffeine, spicy foods, and acidic foods, limiting fluid intake 3-4 hours before bed, weight loss, preventing constipation and limiting fluid intake to 60 oz. per day.       History of Present Illness   74-year-old female today for checkup for Medicare wellness as well as checkup on multimedical problems patient with GERD GERD hypothyroidism history of B-cell lymphoma anxiety and elevated BMI.  Patient is taking Topamax for her weight loss which was working in beginning but she states is not working as well as it used to working to see about increasing her dose from 50 twice a day to 100 mg twice a day.  Otherwise patient is doing well with the rest of her medicines to be she has some lab work from being in the ER which showed she appear to be a little dehydrated and her GFR was down to 40 with normally is in the 70-80 range she is due for yearly lab work which she will be getting shortly and we will reassess that when that is available.  Also long discussion with patient about weight loss and use of Topamax she is also going to look into see if Wegovy or Zepbound are covered by her insurance these are really good options for her if she has coverage.  Will get back to me on that.      Review of Systems   Constitutional:  Negative for activity change, appetite change, fatigue and fever.   HENT:  Negative for congestion, ear pain, postnasal drip, rhinorrhea, sinus pressure, sinus pain, sneezing and sore throat.    Eyes:  Negative for pain and redness.   Respiratory:  Negative for apnea, cough, chest tightness, shortness of breath and wheezing.    Cardiovascular:  Negative for chest pain, palpitations and leg swelling.   Gastrointestinal:  " Negative for abdominal pain, constipation, diarrhea, nausea and vomiting.   Endocrine: Negative for cold intolerance and heat intolerance.   Genitourinary:  Negative for difficulty urinating, dysuria, frequency, hematuria and urgency.   Musculoskeletal:  Negative for arthralgias, back pain, gait problem and myalgias.   Skin:  Negative for rash.   Neurological:  Negative for dizziness, speech difficulty, weakness, numbness and headaches.   Hematological:  Does not bruise/bleed easily.   Psychiatric/Behavioral:  Negative for agitation, confusion and hallucinations.        Objective   /82 (BP Location: Left arm, Patient Position: Sitting, Cuff Size: Large)   Pulse 61   Temp 97.5 °F (36.4 °C) (Temporal)   Ht 5' 5\" (1.651 m)   Wt 90.3 kg (199 lb)   SpO2 98%   BMI 33.12 kg/m²      Physical Exam  Constitutional:       Appearance: Normal appearance. She is not ill-appearing.   HENT:      Head: Normocephalic and atraumatic.      Right Ear: Tympanic membrane normal.      Left Ear: Tympanic membrane normal.      Nose: Nose normal.      Mouth/Throat:      Mouth: Mucous membranes are moist.   Eyes:      Extraocular Movements: Extraocular movements intact.      Conjunctiva/sclera: Conjunctivae normal.      Pupils: Pupils are equal, round, and reactive to light.   Cardiovascular:      Rate and Rhythm: Normal rate and regular rhythm.   Pulmonary:      Effort: Pulmonary effort is normal. No respiratory distress.      Breath sounds: Normal breath sounds. No wheezing.   Abdominal:      General: Bowel sounds are normal.      Palpations: Abdomen is soft.      Tenderness: There is no abdominal tenderness.   Musculoskeletal:         General: No tenderness. Normal range of motion.      Cervical back: Normal range of motion and neck supple.      Right lower leg: No edema.      Left lower leg: No edema.   Skin:     General: Skin is warm and dry.   Neurological:      Mental Status: She is alert and oriented to person, place, and " "time.   Psychiatric:         Mood and Affect: Mood normal.         Behavior: Behavior normal.         Thought Content: Thought content normal.         Judgment: Judgment normal.         Answers submitted by the patient for this visit:  Medicare Annual Wellness Visit (Submitted on 2/28/2025)  How would you rate your overall health?: good  Compared to last year, how is your physical health?: slightly better  In general, how satisfied are you with your life?: very satisfied  Compared to last year, how is your eyesight?: same  Compared to last year, how is your hearing?: same  Compared to last year, how is your emotional/mental health?: slightly better  How often is anger a problem for you?: never, rarely  How often do you feel unusually tired/fatigued?: sometimes  In the past 7 days, how much pain have you experienced?: some  If you answered \"some\" or \"a lot\", please rate the severity of your pain on a scale of 1 to 10 (1 being the least severe pain and 10 being the most intense pain).: 6/10  In the past 6 months, have you lost or gained 10 pounds without trying?: No  One or more falls in the last year: No  In the past 6 months, have you accidentally leaked urine?: No  Do you have trouble with the stairs inside or outside your home?: No  Does your home have working smoke alarms?: Yes  Does your home have a carbon monoxide monitor?: Yes  Which safety hazards (if any) have you experienced in your home? Please select all that apply.: none  How would you describe your current diet? Please select all that apply.: Limited junk food  In addition to prescription medications, are you taking any over-the-counter supplements?: Yes  If yes, what supplements are you taking?: Vitamins  Can you manage your medications?: Yes  Are you currently taking any opioid medications?: No  Can you walk and transfer into and out of your bed and chair?: Yes  Can you dress and groom yourself?: Yes  Can you bathe or shower yourself?: Yes  Can you " feed yourself?: Yes  Can you do your laundry/ housekeeping?: Yes  Can you manage your money, pay your bills, and track your expenses?: Yes  Can you make your own meals?: Yes  Can you do your own shopping?: Yes  Within the last 12 months, have you had any hospitalizations or Emergency Department visits?: Yes  If yes, how many times have you been hospitalized within the past year?: 1-2  Do you have a living will?: No  Do you have a Durable POA (Power of ) for healthcare decisions?: Yes  Do you have an Advanced Directive for end of life decisions?: No  How often have you used an illegal drug (including marijuana) or a prescription medication for non-medical reasons in the past year?: never  What is the typical number of drinks you consume in a day?: 0  What is the typical number of drinks you consume in a week?: 0  How often did you have a drink containing alcohol in the past year?: never  How many drinks did you have on a typical day  when you were drinking in the past year?: 1 to 2  How often did you have 6 or more drinks on one occasion in the past year?: never

## 2025-03-05 ENCOUNTER — OFFICE VISIT (OUTPATIENT)
Dept: FAMILY MEDICINE CLINIC | Facility: CLINIC | Age: 75
End: 2025-03-05
Payer: COMMERCIAL

## 2025-03-05 VITALS
TEMPERATURE: 97.5 F | OXYGEN SATURATION: 98 % | SYSTOLIC BLOOD PRESSURE: 112 MMHG | HEART RATE: 61 BPM | WEIGHT: 199 LBS | HEIGHT: 65 IN | DIASTOLIC BLOOD PRESSURE: 82 MMHG | BODY MASS INDEX: 33.15 KG/M2

## 2025-03-05 DIAGNOSIS — E03.4 HYPOTHYROIDISM DUE TO ACQUIRED ATROPHY OF THYROID: ICD-10-CM

## 2025-03-05 DIAGNOSIS — F41.9 ANXIETY: ICD-10-CM

## 2025-03-05 DIAGNOSIS — E66.812 CLASS 2 OBESITY WITHOUT SERIOUS COMORBIDITY IN ADULT, UNSPECIFIED BMI, UNSPECIFIED OBESITY TYPE: ICD-10-CM

## 2025-03-05 DIAGNOSIS — C83.00 SMALL B-CELL LYMPHOMA, UNSPECIFIED BODY REGION (HCC): ICD-10-CM

## 2025-03-05 DIAGNOSIS — K21.9 GASTROESOPHAGEAL REFLUX DISEASE, UNSPECIFIED WHETHER ESOPHAGITIS PRESENT: ICD-10-CM

## 2025-03-05 DIAGNOSIS — E66.812 CLASS 2 OBESITY DUE TO EXCESS CALORIES WITHOUT SERIOUS COMORBIDITY WITH BODY MASS INDEX (BMI) OF 35.0 TO 35.9 IN ADULT: ICD-10-CM

## 2025-03-05 DIAGNOSIS — Z00.00 WELL ADULT EXAM: Primary | ICD-10-CM

## 2025-03-05 DIAGNOSIS — E66.09 CLASS 2 OBESITY DUE TO EXCESS CALORIES WITHOUT SERIOUS COMORBIDITY WITH BODY MASS INDEX (BMI) OF 35.0 TO 35.9 IN ADULT: ICD-10-CM

## 2025-03-05 PROCEDURE — 99214 OFFICE O/P EST MOD 30 MIN: CPT | Performed by: FAMILY MEDICINE

## 2025-03-05 PROCEDURE — G0439 PPPS, SUBSEQ VISIT: HCPCS | Performed by: FAMILY MEDICINE

## 2025-03-05 RX ORDER — TOPIRAMATE 100 MG/1
100 TABLET, FILM COATED ORAL 2 TIMES DAILY
Qty: 180 TABLET | Refills: 1 | Status: SHIPPED | OUTPATIENT
Start: 2025-03-05

## 2025-03-05 NOTE — ASSESSMENT & PLAN NOTE
Orders:  •  topiramate (TOPAMAX) 100 mg tablet; Take 1 tablet (100 mg total) by mouth 2 (two) times a day

## 2025-03-05 NOTE — PROGRESS NOTES
Name: Renay Herrera      : 1950      MRN: [id number]  Encounter Provider: Jamison May MD  Encounter Date: 3/5/2025   Encounter department: [facility]    Assessment & Plan  Well adult exam         Anxiety         Class 2 obesity without serious comorbidity in adult, unspecified BMI, unspecified obesity type         Gastroesophageal reflux disease, unspecified whether esophagitis present         Hypothyroidism due to acquired atrophy of thyroid         Small B-cell lymphoma, unspecified body region (HCC)         Class 2 obesity due to excess calories without serious comorbidity with body mass index (BMI) of 35.0 to 35.9 in adult       Preventive health issues were discussed with patient, and age appropriate screening tests were ordered as noted in patient's After Visit Summary. Personalized health advice and appropriate referrals for health education or preventive services given if needed, as noted in patient's After Visit Summary.    History of Present Illness     HPI   Patient Care Team:  Jamison May MD as PCP - General (Family Medicine)    Review of Systems  Medical History Reviewed by provider this encounter:  Tobacco  Allergies  Meds  Problems  Med Hx  Surg Hx  Fam Hx       Annual Wellness Visit Questionnaire   Renay is here for her Subsequent Wellness visit. Last Medicare Wellness visit information reviewed, patient interviewed and updates made to the record today.      Health Risk Assessment:   Patient rates overall health as good. Patient feels that their physical health rating is slightly better. Patient is very satisfied with their life. Eyesight was rated as same. Hearing was rated as same. Patient feels that their emotional and mental health rating is slightly better. Patients states they are never, rarely angry. Patient states they are sometimes unusually tired/fatigued. Pain experienced in the last 7 days has been some. Patient's pain rating has  been 6/10. Patient states that she has experienced no weight loss or gain in last 6 months.     Fall Risk Screening:   In the past year, patient has experienced: no history of falling in past year      Urinary Incontinence Screening:   Patient has not leaked urine accidently in the last six months.     Home Safety:  Patient does not have trouble with stairs inside or outside of their home. Patient has working smoke alarms and has working carbon monoxide detector. Home safety hazards include: none.     Nutrition:   Current diet is Limited junk food.     Medications:   Patient is currently taking over-the-counter supplements. OTC medications include: see medication list. Patient is able to manage medications.     Activities of Daily Living (ADLs)/Instrumental Activities of Daily Living (IADLs):   Walk and transfer into and out of bed and chair?: Yes  Dress and groom yourself?: Yes    Bathe or shower yourself?: Yes    Feed yourself? Yes  Do your laundry/housekeeping?: Yes  Manage your money, pay your bills and track your expenses?: Yes  Make your own meals?: Yes    Do your own shopping?: Yes    Previous Hospitalizations:   Any hospitalizations or ED visits within the last 12 months?: Yes    How many hospitalizations have you had in the last year?: 1-2    Advance Care Planning:   Living will: No    Durable POA for healthcare: Yes    Advanced directive: No      PREVENTIVE SCREENINGS      Cardiovascular Screening:    General: Screening Current      Diabetes Screening:     General: Screening Current      Breast Cancer Screening:     General: Screening Current      Cervical Cancer Screening:    General: Screening Not Indicated      Lung Cancer Screening:     General: Screening Not Indicated      Hepatitis C Screening:    General: Screening Current    Screening, Brief Intervention, and Referral to Treatment (SBIRT)     Screening  Typical number of drinks in a day: 0  Typical number of drinks in a week: 0  Interpretation: Low  "risk drinking behavior.    AUDIT-C Screenin) How often did you have a drink containing alcohol in the past year? never  2) How many drinks did you have on a typical day when you were drinking in the past year? 1 to 2  3) How often did you have 6 or more drinks on one occasion in the past year? never    AUDIT-C Score: 0  Interpretation: Score 0-2 (female): Negative screen for alcohol misuse    Single Item Drug Screening:  How often have you used an illegal drug (including marijuana) or a prescription medication for non-medical reasons in the past year? never    Single Item Drug Screen Score: 0  Interpretation: Negative screen for possible drug use disorder    Social Drivers of Health     Financial Resource Strain: Low Risk  (2024)    Overall Financial Resource Strain (CARDIA)    • Difficulty of Paying Living Expenses: Not hard at all   Food Insecurity: Patient Declined (3/5/2025)    Hunger Vital Sign    • Worried About Running Out of Food in the Last Year: Patient declined    • Ran Out of Food in the Last Year: Patient declined   Transportation Needs: Patient Declined (3/5/2025)    PRAPARE - Transportation    • Lack of Transportation (Medical): Patient declined    • Lack of Transportation (Non-Medical): Patient declined   Housing Stability: Patient Declined (3/5/2025)    Housing Stability Vital Sign    • Unable to Pay for Housing in the Last Year: Patient declined    • Number of Times Moved in the Last Year: 0    • Homeless in the Last Year: Patient declined   Utilities: Patient Declined (3/5/2025)    [facility] Utilities    • Threatened with loss of utilities: Patient declined     No results found.    Objective   /82 (BP Location: Left arm, Patient Position: Sitting, Cuff Size: Large)   Pulse 61   Temp 97.5 °F (36.4 °C) (Temporal)   Ht 5' 5\" (1.651 m)   Wt 90.3 kg (199 lb)   SpO2 98%   BMI 33.12 kg/m²     Physical Exam    "

## 2025-03-07 ENCOUNTER — APPOINTMENT (OUTPATIENT)
Dept: LAB | Facility: MEDICAL CENTER | Age: 75
End: 2025-03-07
Payer: COMMERCIAL

## 2025-03-07 DIAGNOSIS — R53.83 OTHER FATIGUE: Primary | ICD-10-CM

## 2025-03-07 DIAGNOSIS — C83.00 SMALL B-CELL LYMPHOMA, UNSPECIFIED BODY REGION (HCC): ICD-10-CM

## 2025-03-07 LAB
ALBUMIN SERPL BCG-MCNC: 4.2 G/DL (ref 3.5–5)
ALP SERPL-CCNC: 60 U/L (ref 34–104)
ALT SERPL W P-5'-P-CCNC: 17 U/L (ref 7–52)
ANION GAP SERPL CALCULATED.3IONS-SCNC: 7 MMOL/L (ref 4–13)
AST SERPL W P-5'-P-CCNC: 18 U/L (ref 13–39)
BACTERIA UR QL AUTO: ABNORMAL /HPF
BASOPHILS # BLD AUTO: 0.04 THOUSANDS/ÂΜL (ref 0–0.1)
BASOPHILS NFR BLD AUTO: 1 % (ref 0–1)
BILIRUB SERPL-MCNC: 0.45 MG/DL (ref 0.2–1)
BILIRUB UR QL STRIP: NEGATIVE
BUN SERPL-MCNC: 20 MG/DL (ref 5–25)
CALCIUM SERPL-MCNC: 9.9 MG/DL (ref 8.4–10.2)
CAOX CRY URNS QL MICRO: ABNORMAL /HPF
CHLORIDE SERPL-SCNC: 111 MMOL/L (ref 96–108)
CHOLEST SERPL-MCNC: 209 MG/DL (ref ?–200)
CLARITY UR: CLEAR
CO2 SERPL-SCNC: 26 MMOL/L (ref 21–32)
COLOR UR: YELLOW
CREAT SERPL-MCNC: 0.8 MG/DL (ref 0.6–1.3)
EOSINOPHIL # BLD AUTO: 0.16 THOUSAND/ÂΜL (ref 0–0.61)
EOSINOPHIL NFR BLD AUTO: 4 % (ref 0–6)
ERYTHROCYTE [DISTWIDTH] IN BLOOD BY AUTOMATED COUNT: 12.7 % (ref 11.6–15.1)
GFR SERPL CREATININE-BSD FRML MDRD: 72 ML/MIN/1.73SQ M
GLUCOSE P FAST SERPL-MCNC: 102 MG/DL (ref 65–99)
GLUCOSE UR STRIP-MCNC: NEGATIVE MG/DL
HCT VFR BLD AUTO: 47.4 % (ref 34.8–46.1)
HDLC SERPL-MCNC: 68 MG/DL
HGB BLD-MCNC: 15 G/DL (ref 11.5–15.4)
HGB UR QL STRIP.AUTO: NEGATIVE
IMM GRANULOCYTES # BLD AUTO: 0.01 THOUSAND/UL (ref 0–0.2)
IMM GRANULOCYTES NFR BLD AUTO: 0 % (ref 0–2)
KETONES UR STRIP-MCNC: NEGATIVE MG/DL
LDH SERPL-CCNC: 171 U/L (ref 140–271)
LDLC SERPL CALC-MCNC: 119 MG/DL (ref 0–100)
LEUKOCYTE ESTERASE UR QL STRIP: ABNORMAL
LYMPHOCYTES # BLD AUTO: 2.81 THOUSANDS/ÂΜL (ref 0.6–4.47)
LYMPHOCYTES NFR BLD AUTO: 62 % (ref 14–44)
MAGNESIUM SERPL-MCNC: 2 MG/DL (ref 1.9–2.7)
MCH RBC QN AUTO: 31 PG (ref 26.8–34.3)
MCHC RBC AUTO-ENTMCNC: 31.6 G/DL (ref 31.4–37.4)
MCV RBC AUTO: 98 FL (ref 82–98)
MONOCYTES # BLD AUTO: 0.26 THOUSAND/ÂΜL (ref 0.17–1.22)
MONOCYTES NFR BLD AUTO: 6 % (ref 4–12)
MUCOUS THREADS UR QL AUTO: ABNORMAL
NEUTROPHILS # BLD AUTO: 1.24 THOUSANDS/ÂΜL (ref 1.85–7.62)
NEUTS SEG NFR BLD AUTO: 27 % (ref 43–75)
NITRITE UR QL STRIP: NEGATIVE
NON-SQ EPI CELLS URNS QL MICRO: ABNORMAL /HPF
NRBC BLD AUTO-RTO: 0 /100 WBCS
PH UR STRIP.AUTO: 6 [PH]
PLATELET # BLD AUTO: 157 THOUSANDS/UL (ref 149–390)
PMV BLD AUTO: 10.5 FL (ref 8.9–12.7)
POTASSIUM SERPL-SCNC: 4.4 MMOL/L (ref 3.5–5.3)
PROT SERPL-MCNC: 6.6 G/DL (ref 6.4–8.4)
PROT UR STRIP-MCNC: ABNORMAL MG/DL
RBC # BLD AUTO: 4.84 MILLION/UL (ref 3.81–5.12)
RBC #/AREA URNS AUTO: ABNORMAL /HPF
SODIUM SERPL-SCNC: 144 MMOL/L (ref 135–147)
SP GR UR STRIP.AUTO: 1.02 (ref 1–1.03)
TRANS CELLS #/AREA URNS HPF: PRESENT /[HPF]
TRIGL SERPL-MCNC: 111 MG/DL (ref ?–150)
TSH SERPL DL<=0.05 MIU/L-ACNC: 1.69 UIU/ML (ref 0.45–4.5)
URATE SERPL-MCNC: 5 MG/DL (ref 2–7.5)
UROBILINOGEN UR STRIP-ACNC: <2 MG/DL
WBC # BLD AUTO: 4.52 THOUSAND/UL (ref 4.31–10.16)
WBC #/AREA URNS AUTO: ABNORMAL /HPF

## 2025-03-07 PROCEDURE — 83615 LACTATE (LD) (LDH) ENZYME: CPT

## 2025-03-07 PROCEDURE — 87086 URINE CULTURE/COLONY COUNT: CPT

## 2025-03-07 PROCEDURE — 81001 URINALYSIS AUTO W/SCOPE: CPT

## 2025-03-08 ENCOUNTER — RESULTS FOLLOW-UP (OUTPATIENT)
Dept: FAMILY MEDICINE CLINIC | Facility: CLINIC | Age: 75
End: 2025-03-08

## 2025-03-09 LAB — BACTERIA UR CULT: NORMAL

## 2025-03-18 DIAGNOSIS — K21.9 GASTROESOPHAGEAL REFLUX DISEASE, UNSPECIFIED WHETHER ESOPHAGITIS PRESENT: ICD-10-CM

## 2025-03-18 DIAGNOSIS — R10.13 EPIGASTRIC PAIN: ICD-10-CM

## 2025-03-19 RX ORDER — DICYCLOMINE HYDROCHLORIDE 10 MG/1
10 CAPSULE ORAL DAILY
Qty: 90 CAPSULE | Refills: 1 | Status: SHIPPED | OUTPATIENT
Start: 2025-03-19

## 2025-03-20 ENCOUNTER — OFFICE VISIT (OUTPATIENT)
Dept: HEMATOLOGY ONCOLOGY | Facility: CLINIC | Age: 75
End: 2025-03-20
Payer: COMMERCIAL

## 2025-03-20 VITALS
HEIGHT: 65 IN | HEART RATE: 63 BPM | DIASTOLIC BLOOD PRESSURE: 70 MMHG | BODY MASS INDEX: 33.15 KG/M2 | OXYGEN SATURATION: 99 % | SYSTOLIC BLOOD PRESSURE: 128 MMHG | TEMPERATURE: 97.7 F | WEIGHT: 199 LBS

## 2025-03-20 DIAGNOSIS — M47.816 LUMBAR SPONDYLOSIS: ICD-10-CM

## 2025-03-20 DIAGNOSIS — Z98.890 HISTORY OF LUMBOSACRAL SPINE SURGERY: ICD-10-CM

## 2025-03-20 DIAGNOSIS — C83.09 SMALL B-CELL LYMPHOMA OF SOLID ORGAN EXCLUDING SPLEEN (HCC): Primary | ICD-10-CM

## 2025-03-20 PROCEDURE — 99214 OFFICE O/P EST MOD 30 MIN: CPT | Performed by: INTERNAL MEDICINE

## 2025-03-20 NOTE — ASSESSMENT & PLAN NOTE
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 Herrera is well and clinically there are no concerning hematology findings, no adenopathy.  Blood work was good/acceptable.  The plan is to continue with surveillance.  Patient feels comfortable returning in 1 year with repeat blood work before.

## 2025-03-20 NOTE — PROGRESS NOTES
"Name: Renay Herrera      : 1950      MRN: [id number]  Encounter Provider: Neftali Holman MD  Encounter Date: 3/20/2025   Encounter department: 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 Herrera is well and clinically there are no concerning hematology findings, no adenopathy.  Blood work was good/acceptable.  The plan is to continue with surveillance.  Patient feels comfortable returning in 1 year with repeat blood work before.       History of Present Illness   HPI  74-year-old female previously diagnosed with SLL recently transferring her care to [facility].      Mrs. Herrera was diagnosed with SLL by abnormal mammogram and supraclavicular lymphadenopathy in 2018 positive for 13 q. deletion, confirmed by FISH panel of other sites involvement, normal hemoglobin, platelets, WBC with lymphocytosis    Renay Herrera is a 74 y.o. female who presents       Review of Systems   Constitutional:  Negative for chills and fever.   HENT:  Negative for ear pain and sore throat.    Eyes:  Negative for pain and visual disturbance.   Respiratory:  Negative for cough and shortness of breath.    Cardiovascular:  Negative for chest pain and palpitations.   Gastrointestinal:  Negative for abdominal pain and vomiting.   Genitourinary:  Negative for dysuria and hematuria.   Musculoskeletal:  Positive for arthralgias and gait problem. Negative for back pain.   Skin:  Negative for color change and rash.   Neurological:  Negative for seizures and syncope.   All other systems reviewed and are negative.         Objective   /70   Pulse 63   Temp 97.7 °F (36.5 °C) (Temporal)   Ht 5' 5\" (1.651 m)   Wt 90.3 kg (199 lb)   SpO2 " 99%   BMI 33.12 kg/m²      Physical Exam  Vitals reviewed.   Constitutional:       General: She is not in acute distress.     Appearance: She is well-developed. She is obese. She is not diaphoretic.   HENT:      Head: Normocephalic and atraumatic.   Eyes:      Conjunctiva/sclera: Conjunctivae normal.   Neck:      Trachea: No tracheal deviation.   Cardiovascular:      Rate and Rhythm: Normal rate and regular rhythm.      Heart sounds: No murmur heard.     No friction rub. No gallop.   Pulmonary:      Effort: Pulmonary effort is normal. No respiratory distress.      Breath sounds: Normal breath sounds. No wheezing or rales.   Chest:      Chest wall: No tenderness.   Abdominal:      General: There is no distension.      Palpations: Abdomen is soft.      Tenderness: There is no abdominal tenderness.   Musculoskeletal:      Cervical back: Normal range of motion and neck supple.      Right lower leg: No edema.      Left lower leg: No edema.   Lymphadenopathy:      Cervical: No cervical adenopathy.   Skin:     General: Skin is warm and dry.      Coloration: Skin is not pale.      Findings: No erythema.   Neurological:      Mental Status: She is alert. Mental status is at baseline.   Psychiatric:         Behavior: Behavior normal.         Thought Content: Thought content normal.

## 2025-03-24 ENCOUNTER — TELEPHONE (OUTPATIENT)
Age: 75
End: 2025-03-24

## 2025-03-24 NOTE — TELEPHONE ENCOUNTER
Caller: Renay ADAN    Doctor: Dr. Ugalde    Reason for call: Schedule and injection     Call back#: [phone]

## 2025-04-02 DIAGNOSIS — F33.41 RECURRENT MAJOR DEPRESSIVE DISORDER, IN PARTIAL REMISSION (HCC): ICD-10-CM

## 2025-04-03 RX ORDER — TRAZODONE HYDROCHLORIDE 50 MG/1
50 TABLET ORAL
Qty: 90 TABLET | Refills: 1 | Status: SHIPPED | OUTPATIENT
Start: 2025-04-03

## 2025-04-04 ENCOUNTER — RA CDI HCC (OUTPATIENT)
Dept: OTHER | Facility: HOSPITAL | Age: 75
End: 2025-04-04

## 2025-04-06 NOTE — PROGRESS NOTES
Name: Renay Herrera      : 1950      MRN: [id number]  Encounter Provider: Jamison May MD  Encounter Date: 2025   Encounter department: [facility]  :  Assessment & Plan  Anxiety  Patient to continue utilizing medical therapy as well as counseling sources as applicable to condition. If suicidal thought or fear of suicide attempt, to call 911 and seek help immediately. Medications and therapy reviewed today and all patient  questions answered today.        Bipolar affective disorder, currently depressed, mild (HCC)  Patient is stable  and will continue present plan of care and reassess at next routine visit. All questions about this problem from patient were answered today.        Class 2 obesity without serious comorbidity in adult, unspecified BMI, unspecified obesity type  Patient to increase exercise and partake of a diet with less calories to promote  weight loss        Current moderate episode of major depressive disorder without prior episode (HCC)  Patient to continue utilizing medical therapy as well and counseling sources as applicable for condition. If  suicidal thought or fear of suicide to contact 911 and seek help immediately. Meds reviewed and patient questions answered today        Gastroesophageal reflux disease, unspecified whether esophagitis present  Patient to continue with present therapy and decrease caffeine, avoid ETOH and smoking to decrease acid production. Pt should also cease eating prior to bedtime and avoid excessive fluid intake prior to sleep. May use antacids as needed for breakthrough GERD. All pateint questions answered today about this condition.        Hypothyroidism due to acquired atrophy of thyroid  Patient to continue current dose of thyroid medicine and recheck TSH in 6 months        Small B-cell lymphoma of solid organ excluding spleen (HCC)  Patient is stable  and will continue present plan of care and reassess at next routine  visit. All questions about this problem from patient were answered today.              Depression Screening and Follow-up Plan:   Continue regular follow-up with their mental health provider who is managing their mental health condition(s). Patient with underlying depression and was advised to continue current medications as prescribed. Patient advised to follow-up with PCP for further management.     Falls Plan of Care: balance, strength, and gait training instructions were provided. Home safety education provided.     Urinary Incontinence Plan of Care: counseling topics discussed: practice Kegel (pelvic floor strengthening) exercises, use restroom every 2 hours, limit alcohol, caffeine, spicy foods, and acidic foods, limiting fluid intake 3-4 hours before bed, weight loss, preventing constipation and limiting fluid intake to 60 oz. per day.     History of Present Illness   74-year-old female today for checkup on multimedical problems.  Patient history of small B-cell lymphoma that she is stable with hypothyroidism history of GERD and she is here today to discuss hyperlipidemia.  Patient with elevation of her lipid up to 209 which is out of the outdoor out of the ordinary for her.  Patient is always had normal cholesterol but her cholesterol has recently gone up to 209 and we here to discuss why that might have happened.  Patient has no change in her diet she does not really eat a lot of dairy products and no real change in her appetite and her in her diet.  I will give her a low-cholesterol diet we will give her 3 months time to see if she can bring that back down just with diet changes at this time.  Will review her cholesterol back in 3 months and we will take it from there at that time.      Review of Systems   Constitutional:  Negative for activity change, appetite change, fatigue and fever.   HENT:  Negative for congestion, ear pain, postnasal drip, rhinorrhea, sinus pressure, sinus pain, sneezing and sore  "throat.    Eyes:  Negative for pain and redness.   Respiratory:  Negative for apnea, cough, chest tightness, shortness of breath and wheezing.    Cardiovascular:  Negative for chest pain, palpitations and leg swelling.   Gastrointestinal:  Negative for abdominal pain, constipation, diarrhea, nausea and vomiting.   Endocrine: Negative for cold intolerance and heat intolerance.   Genitourinary:  Negative for difficulty urinating, dysuria, frequency, hematuria and urgency.   Musculoskeletal:  Negative for arthralgias, back pain, gait problem and myalgias.   Skin:  Negative for rash.   Neurological:  Negative for dizziness, speech difficulty, weakness, numbness and headaches.   Hematological:  Does not bruise/bleed easily.   Psychiatric/Behavioral:  Negative for agitation, confusion and hallucinations.        Objective   /72 (BP Location: Left arm, Patient Position: Sitting, Cuff Size: Large)   Pulse 68   Temp (!) 97.3 °F (36.3 °C) (Temporal)   Ht 5' 5\" (1.651 m)   Wt 88.5 kg (195 lb)   SpO2 97%   BMI 32.45 kg/m²      Physical Exam  Constitutional:       Appearance: Normal appearance. She is not ill-appearing.   HENT:      Head: Normocephalic and atraumatic.      Right Ear: Tympanic membrane normal.      Left Ear: Tympanic membrane normal.      Nose: Nose normal.      Mouth/Throat:      Mouth: Mucous membranes are moist.   Eyes:      Extraocular Movements: Extraocular movements intact.      Conjunctiva/sclera: Conjunctivae normal.      Pupils: Pupils are equal, round, and reactive to light.   Cardiovascular:      Rate and Rhythm: Normal rate and regular rhythm.   Pulmonary:      Effort: Pulmonary effort is normal. No respiratory distress.      Breath sounds: Normal breath sounds. No wheezing.   Abdominal:      General: Bowel sounds are normal.      Palpations: Abdomen is soft.      Tenderness: There is no abdominal tenderness.   Musculoskeletal:         General: No tenderness. Normal range of motion.      " Cervical back: Normal range of motion and neck supple.      Right lower leg: No edema.      Left lower leg: No edema.   Skin:     General: Skin is warm and dry.   Neurological:      Mental Status: She is alert and oriented to person, place, and time.   Psychiatric:         Mood and Affect: Mood normal.         Behavior: Behavior normal.         Thought Content: Thought content normal.         Judgment: Judgment normal.

## 2025-04-06 NOTE — ASSESSMENT & PLAN NOTE
Patient is stable  and will continue present plan of care and reassess at next routine visit. All questions about this problem from patient were answered today.      
Patient is stable  and will continue present plan of care and reassess at next routine visit. All questions about this problem from patient were answered today.      
Patient to continue current dose of thyroid medicine and recheck TSH in 6 months      
Patient to continue utilizing medical therapy as well and counseling sources as applicable for condition. If  suicidal thought or fear of suicide to contact 911 and seek help immediately. Meds reviewed and patient questions answered today      
Patient to continue utilizing medical therapy as well as counseling sources as applicable to condition. If suicidal thought or fear of suicide attempt, to call 911 and seek help immediately. Medications and therapy reviewed today and all patient  questions answered today.      
Patient to continue with present therapy and decrease caffeine, avoid ETOH and smoking to decrease acid production. Pt should also cease eating prior to bedtime and avoid excessive fluid intake prior to sleep. May use antacids as needed for breakthrough GERD. All pateint questions answered today about this condition.      
Patient to increase exercise and partake of a diet with less calories to promote  weight loss      
Milka Vega  (NP)  2020 18:07:56

## 2025-04-07 ENCOUNTER — OFFICE VISIT (OUTPATIENT)
Dept: FAMILY MEDICINE CLINIC | Facility: CLINIC | Age: 75
End: 2025-04-07
Payer: COMMERCIAL

## 2025-04-07 VITALS
BODY MASS INDEX: 32.49 KG/M2 | SYSTOLIC BLOOD PRESSURE: 138 MMHG | WEIGHT: 195 LBS | DIASTOLIC BLOOD PRESSURE: 72 MMHG | HEART RATE: 68 BPM | HEIGHT: 65 IN | TEMPERATURE: 97.3 F | OXYGEN SATURATION: 97 %

## 2025-04-07 DIAGNOSIS — F41.9 ANXIETY: Primary | ICD-10-CM

## 2025-04-07 DIAGNOSIS — F32.1 CURRENT MODERATE EPISODE OF MAJOR DEPRESSIVE DISORDER WITHOUT PRIOR EPISODE (HCC): ICD-10-CM

## 2025-04-07 DIAGNOSIS — C83.09 SMALL B-CELL LYMPHOMA OF SOLID ORGAN EXCLUDING SPLEEN (HCC): ICD-10-CM

## 2025-04-07 DIAGNOSIS — F31.31 BIPOLAR AFFECTIVE DISORDER, CURRENTLY DEPRESSED, MILD (HCC): ICD-10-CM

## 2025-04-07 DIAGNOSIS — K21.9 GASTROESOPHAGEAL REFLUX DISEASE, UNSPECIFIED WHETHER ESOPHAGITIS PRESENT: ICD-10-CM

## 2025-04-07 DIAGNOSIS — E66.812 CLASS 2 OBESITY WITHOUT SERIOUS COMORBIDITY IN ADULT, UNSPECIFIED BMI, UNSPECIFIED OBESITY TYPE: ICD-10-CM

## 2025-04-07 DIAGNOSIS — E03.4 HYPOTHYROIDISM DUE TO ACQUIRED ATROPHY OF THYROID: ICD-10-CM

## 2025-04-07 PROCEDURE — G2211 COMPLEX E/M VISIT ADD ON: HCPCS | Performed by: FAMILY MEDICINE

## 2025-04-07 PROCEDURE — 99214 OFFICE O/P EST MOD 30 MIN: CPT | Performed by: FAMILY MEDICINE

## 2025-04-11 ENCOUNTER — TELEPHONE (OUTPATIENT)
Dept: PAIN MEDICINE | Facility: CLINIC | Age: 75
End: 2025-04-11

## 2025-04-11 ENCOUNTER — HOSPITAL ENCOUNTER (OUTPATIENT)
Dept: RADIOLOGY | Facility: HOSPITAL | Age: 75
Discharge: HOME | End: 2025-04-11
Attending: PAIN MEDICINE
Payer: COMMERCIAL

## 2025-04-11 VITALS
RESPIRATION RATE: 16 BRPM | TEMPERATURE: 97.1 F | DIASTOLIC BLOOD PRESSURE: 82 MMHG | HEART RATE: 60 BPM | SYSTOLIC BLOOD PRESSURE: 152 MMHG | OXYGEN SATURATION: 97 %

## 2025-04-11 DIAGNOSIS — M47.816 LUMBAR SPONDYLOSIS: ICD-10-CM

## 2025-04-11 PROCEDURE — 64636 DESTROY L/S FACET JNT ADDL: CPT | Performed by: PAIN MEDICINE

## 2025-04-11 PROCEDURE — 64635 DESTROY LUMB/SAC FACET JNT: CPT | Performed by: PAIN MEDICINE

## 2025-04-11 RX ORDER — LIDOCAINE HYDROCHLORIDE 10 MG/ML
5 INJECTION, SOLUTION EPIDURAL; INFILTRATION; INTRACAUDAL; PERINEURAL ONCE
Status: COMPLETED | OUTPATIENT
Start: 2025-04-11 | End: 2025-04-11

## 2025-04-11 RX ORDER — BUPIVACAINE HCL/PF 2.5 MG/ML
2 VIAL (ML) INJECTION ONCE
Status: COMPLETED | OUTPATIENT
Start: 2025-04-11 | End: 2025-04-11

## 2025-04-11 RX ORDER — METHYLPREDNISOLONE ACETATE 40 MG/ML
40 INJECTION, SUSPENSION INTRA-ARTICULAR; INTRALESIONAL; INTRAMUSCULAR; PARENTERAL; SOFT TISSUE ONCE
Status: COMPLETED | OUTPATIENT
Start: 2025-04-11 | End: 2025-04-11

## 2025-04-11 RX ADMIN — BUPIVACAINE HYDROCHLORIDE 2 ML: 2.5 INJECTION, SOLUTION EPIDURAL; INFILTRATION; INTRACAUDAL at 11:30

## 2025-04-11 RX ADMIN — LIDOCAINE HYDROCHLORIDE 5 ML: 10 INJECTION, SOLUTION EPIDURAL; INFILTRATION; INTRACAUDAL; PERINEURAL at 11:15

## 2025-04-11 RX ADMIN — METHYLPREDNISOLONE ACETATE 40 MG: 40 INJECTION, SUSPENSION INTRA-ARTICULAR; INTRALESIONAL; INTRAMUSCULAR; SOFT TISSUE at 11:30

## 2025-04-11 RX ADMIN — LIDOCAINE HYDROCHLORIDE 3 ML: 20 INJECTION, SOLUTION EPIDURAL; INFILTRATION; INTRACAUDAL; PERINEURAL at 11:26

## 2025-04-11 NOTE — DISCHARGE INSTR - LAB

## 2025-04-11 NOTE — TELEPHONE ENCOUNTER
Patient is S/P a Right L4-L5 RFA c/ AR on 4/11/25.  Next RFA scheduled for 4/18/25.  Please call on 4/14/25 for F/U. Thanks

## 2025-04-18 ENCOUNTER — HOSPITAL ENCOUNTER (OUTPATIENT)
Dept: RADIOLOGY | Facility: HOSPITAL | Age: 75
Discharge: HOME | End: 2025-04-18
Attending: PAIN MEDICINE
Payer: COMMERCIAL

## 2025-04-18 ENCOUNTER — TELEPHONE (OUTPATIENT)
Dept: RADIOLOGY | Facility: HOSPITAL | Age: 75
End: 2025-04-18

## 2025-04-18 VITALS
RESPIRATION RATE: 18 BRPM | TEMPERATURE: 96.8 F | OXYGEN SATURATION: 96 % | HEART RATE: 57 BPM | DIASTOLIC BLOOD PRESSURE: 69 MMHG | SYSTOLIC BLOOD PRESSURE: 140 MMHG

## 2025-04-18 DIAGNOSIS — M47.816 LUMBAR SPONDYLOSIS: ICD-10-CM

## 2025-04-18 PROCEDURE — 64635 DESTROY LUMB/SAC FACET JNT: CPT | Performed by: PAIN MEDICINE

## 2025-04-18 RX ORDER — LIDOCAINE HYDROCHLORIDE 10 MG/ML
10 INJECTION, SOLUTION EPIDURAL; INFILTRATION; INTRACAUDAL; PERINEURAL ONCE
Status: COMPLETED | OUTPATIENT
Start: 2025-04-18 | End: 2025-04-18

## 2025-04-18 RX ORDER — METHYLPREDNISOLONE ACETATE 40 MG/ML
40 INJECTION, SUSPENSION INTRA-ARTICULAR; INTRALESIONAL; INTRAMUSCULAR; PARENTERAL; SOFT TISSUE ONCE
Status: COMPLETED | OUTPATIENT
Start: 2025-04-18 | End: 2025-04-18

## 2025-04-18 RX ORDER — BUPIVACAINE HCL/PF 2.5 MG/ML
3 VIAL (ML) INJECTION ONCE
Status: COMPLETED | OUTPATIENT
Start: 2025-04-18 | End: 2025-04-18

## 2025-04-18 RX ADMIN — METHYLPREDNISOLONE ACETATE 40 MG: 40 INJECTION, SUSPENSION INTRA-ARTICULAR; INTRALESIONAL; INTRAMUSCULAR; SOFT TISSUE at 11:07

## 2025-04-18 RX ADMIN — LIDOCAINE HYDROCHLORIDE 3 ML: 20 INJECTION, SOLUTION EPIDURAL; INFILTRATION; INTRACAUDAL; PERINEURAL at 11:01

## 2025-04-18 RX ADMIN — BUPIVACAINE HYDROCHLORIDE 3 ML: 2.5 INJECTION, SOLUTION EPIDURAL; INFILTRATION; INTRACAUDAL at 11:07

## 2025-04-18 RX ADMIN — LIDOCAINE HYDROCHLORIDE 10 ML: 10 INJECTION, SOLUTION EPIDURAL; INFILTRATION; INTRACAUDAL; PERINEURAL at 10:55

## 2025-04-18 NOTE — H&P
History of Present Illness: The patient is a 74 y.o. female who presents with complaints of left low back pain    Past Medical History:   Diagnosis Date    Allergic     Anxiety     Cancer (HCC) Cant remember    Depression 20 years ago    Disease of thyroid gland     GERD (gastroesophageal reflux disease)     H/O bilateral hip replacements     Kidney stone April 2023    Non Hodgkin's lymphoma (HCC)        Past Surgical History:   Procedure Laterality Date    BACK SURGERY      lumbar spine    COLONOSCOPY      Unk    FL RETROGRADE PYELOGRAM  04/11/2023    HIP SURGERY Bilateral     JOINT REPLACEMENT      KNEE SURGERY Right     TX CYSTO/URETERO W/LITHOTRIPSY &INDWELL STENT INSRT Right 04/11/2023    Procedure: CYSTOSCOPY URETEROSCOPY WITH RETROGRADE PYELOGRAM AND INSERTION STENT URETERAL, STONE BASKET EXTRACTION;  Surgeon: Hung Lau MD;  Location: [facility];  Service: Urology    TOTAL SHOULDER REPLACEMENT Right     TUBAL LIGATION           Current Outpatient Medications:     diclofenac sodium (VOLTAREN) 50 mg EC tablet, TAKE 1 TABLET BY MOUTH TWICE A DAY, Disp: 60 tablet, Rfl: 1    dicyclomine (BENTYL) 10 mg capsule, Take 1 capsule  by mouth daily, Disp: 90 capsule, Rfl: 1    divalproex sodium (DEPAKOTE ER) 500 mg 24 hr tablet, Take 1 tablet by mouth daily, Disp: 100 tablet, Rfl: 0    famotidine (PEPCID) 20 mg tablet, Take 1 tablet (20 mg total) by mouth 2 (two) times a day (Patient not taking: Reported on 3/20/2025), Disp: 180 tablet, Rfl: 5    levothyroxine 75 mcg tablet, TAKE ONE TABLET BY MOUTH IN THE EARLY MORNING, Disp: 90 tablet, Rfl: 1    multivitamin (THERAGRAN) TABS, Take 1 tablet by mouth daily, Disp: , Rfl:     pantoprazole (PROTONIX) 40 mg tablet, Take 1 tablet by mouth daily before breakfast, Disp: 90 tablet, Rfl: 1    topiramate (TOPAMAX) 100 mg tablet, Take 1 tablet (100 mg total) by mouth 2 (two) times a day, Disp: 180 tablet, Rfl: 1    traZODone (DESYREL) 50 mg tablet, Take 1 tablet by mouth  daily at bedtime as needed for sleep, Disp: 90 tablet, Rfl: 1    venlafaxine (EFFEXOR-XR) 150 mg 24 hr capsule, TAKE ONE CAPSULE BY MOUTH EVERY DAY, Disp: 90 capsule, Rfl: 1    Allergies   Allergen Reactions    Clindamycin Hives       Physical Exam: There were no vitals filed for this visit.  General: Awake, Alert, Oriented x 3, Mood and affect appropriate  Respiratory: Respirations even and unlabored  Cardiovascular: Peripheral pulses intact; no edema  Musculoskeletal Exam: TTP low back left side    ASA Score: 1         Assessment:   1. Lumbar spondylosis        Plan: left L45 MBRFA    The risks of the procedure were discussed in detail.  These risks include infection, increased pain, paralysis, bleeding.  Patient understands the risks and is willing to pursue with the procedure

## 2025-04-21 NOTE — TELEPHONE ENCOUNTER
SUZETTE  S/w Pt. Pt stated current pain level is 8/10. Pt states needle sites look good, denies S&S of infection, denies fevers, C/o moderate soreness. Advised Pt if does have pain to take prescribed or OTC pain medications and/or use ice/heat and that it takes 4 to 6 weeks to see the full effect. Pt verbalized understanding.     Clerical, please contact Pt to schedule 4-6 F/u OV with DT.

## 2025-04-23 ENCOUNTER — OFFICE VISIT (OUTPATIENT)
Dept: FAMILY MEDICINE CLINIC | Facility: CLINIC | Age: 75
End: 2025-04-23
Payer: COMMERCIAL

## 2025-04-23 VITALS
SYSTOLIC BLOOD PRESSURE: 120 MMHG | OXYGEN SATURATION: 96 % | TEMPERATURE: 98 F | DIASTOLIC BLOOD PRESSURE: 64 MMHG | WEIGHT: 192 LBS | HEIGHT: 65 IN | RESPIRATION RATE: 18 BRPM | BODY MASS INDEX: 31.99 KG/M2 | HEART RATE: 100 BPM

## 2025-04-23 DIAGNOSIS — M54.2 CERVICALGIA: Primary | ICD-10-CM

## 2025-04-23 DIAGNOSIS — M79.602 ARM PAIN, DIFFUSE, LEFT: ICD-10-CM

## 2025-04-23 PROCEDURE — 93000 ELECTROCARDIOGRAM COMPLETE: CPT | Performed by: NURSE PRACTITIONER

## 2025-04-23 PROCEDURE — 99213 OFFICE O/P EST LOW 20 MIN: CPT | Performed by: NURSE PRACTITIONER

## 2025-04-23 RX ORDER — CYCLOBENZAPRINE HCL 10 MG
10 TABLET ORAL
Qty: 30 TABLET | Refills: 0 | Status: SHIPPED | OUTPATIENT
Start: 2025-04-23

## 2025-04-23 RX ORDER — PREDNISONE 20 MG/1
20 TABLET ORAL DAILY
Qty: 5 TABLET | Refills: 0 | Status: SHIPPED | OUTPATIENT
Start: 2025-04-23 | End: 2025-04-28

## 2025-04-23 NOTE — PROGRESS NOTES
":  Assessment & Plan  Cervicalgia    Orders:    XR spine cervical complete 4 or 5 vw non injury; Future    cyclobenzaprine (FLEXERIL) 10 mg tablet; Take 1 tablet (10 mg total) by mouth daily at bedtime    predniSONE 20 mg tablet; Take 1 tablet (20 mg total) by mouth daily for 5 days  Appears to be cervicalgia uncomplicated.  Did advise I will start with an x-ray of the neck some steroids for inflammation and a muscle relaxer to be taken specifically at hour of sleep.  Return to office if fails to improve  Arm pain, diffuse, left    Orders:    POCT ECG  EKG demonstrated no acute cardiac process going on.    Dosing all possible side effects of the prescribed medications or medications that had been prescribed in the past were reviewed and all questions were answered.  Patient verbalized agreement and understanding of the plan of care as outlined during the office visit today return to office as indicated or sooner if a problem arises.    History of Present Illness     Renay Herrera is a 74 y.o. female   Patient being seen today for diffuse left arm pain that she says radiates into her neck lower jaw unsure of the source.  Has been happening for several weeks.    Shoulder Pain       Review of Systems   Musculoskeletal:  Positive for myalgias and neck pain.     Objective   /64 (BP Location: Right arm, Patient Position: Sitting, Cuff Size: Large)   Pulse 100   Temp 98 °F (36.7 °C) (Temporal)   Resp 18   Ht 5' 5\" (1.651 m)   Wt 87.1 kg (192 lb)   SpO2 96%   BMI 31.95 kg/m²      Physical Exam  Cardiovascular:      Rate and Rhythm: Normal rate and regular rhythm.      Heart sounds: Normal heart sounds.   Pulmonary:      Effort: Pulmonary effort is normal.      Breath sounds: Normal breath sounds.   Musculoskeletal:      Cervical back: Neck supple.   Neurological:      Mental Status: She is alert.           "

## 2025-04-24 ENCOUNTER — APPOINTMENT (OUTPATIENT)
Dept: RADIOLOGY | Facility: MEDICAL CENTER | Age: 75
End: 2025-04-24
Payer: COMMERCIAL

## 2025-04-24 DIAGNOSIS — M54.2 CERVICALGIA: Primary | ICD-10-CM

## 2025-04-24 DIAGNOSIS — M47.22 OSTEOARTHRITIS OF SPINE WITH RADICULOPATHY, CERVICAL REGION: ICD-10-CM

## 2025-04-24 DIAGNOSIS — M54.2 CERVICALGIA: ICD-10-CM

## 2025-04-24 PROCEDURE — 72050 X-RAY EXAM NECK SPINE 4/5VWS: CPT

## 2025-04-25 ENCOUNTER — TELEPHONE (OUTPATIENT)
Dept: PHYSICAL THERAPY | Facility: OTHER | Age: 75
End: 2025-04-25

## 2025-04-25 NOTE — TELEPHONE ENCOUNTER
Call placed to the patient per [facility] referral.    After explanation of the program the patient is refusing at this time.     Referral Closed.

## 2025-04-30 ENCOUNTER — APPOINTMENT (EMERGENCY)
Dept: RADIOLOGY | Facility: HOSPITAL | Age: 75
End: 2025-04-30
Payer: COMMERCIAL

## 2025-04-30 ENCOUNTER — HOSPITAL ENCOUNTER (EMERGENCY)
Facility: HOSPITAL | Age: 75
Discharge: HOME | End: 2025-04-30
Attending: EMERGENCY MEDICINE
Payer: COMMERCIAL

## 2025-04-30 VITALS
TEMPERATURE: 97.7 F | SYSTOLIC BLOOD PRESSURE: 148 MMHG | HEART RATE: 69 BPM | OXYGEN SATURATION: 99 % | RESPIRATION RATE: 18 BRPM | DIASTOLIC BLOOD PRESSURE: 74 MMHG

## 2025-04-30 DIAGNOSIS — S46.212A BICEPS RUPTURE, PROXIMAL, LEFT, INITIAL ENCOUNTER: Primary | ICD-10-CM

## 2025-04-30 PROCEDURE — 73060 X-RAY EXAM OF HUMERUS: CPT

## 2025-04-30 PROCEDURE — 96372 THER/PROPH/DIAG INJ SC/IM: CPT

## 2025-04-30 PROCEDURE — 99284 EMERGENCY DEPT VISIT MOD MDM: CPT | Performed by: EMERGENCY MEDICINE

## 2025-04-30 PROCEDURE — 99283 EMERGENCY DEPT VISIT LOW MDM: CPT

## 2025-04-30 RX ORDER — ACETAMINOPHEN 325 MG/1
975 TABLET ORAL ONCE
Status: COMPLETED | OUTPATIENT
Start: 2025-04-30 | End: 2025-04-30

## 2025-04-30 RX ORDER — KETOROLAC TROMETHAMINE 30 MG/ML
15 INJECTION, SOLUTION INTRAMUSCULAR; INTRAVENOUS ONCE
Status: COMPLETED | OUTPATIENT
Start: 2025-04-30 | End: 2025-04-30

## 2025-04-30 RX ORDER — LIDOCAINE 50 MG/G
1 PATCH TOPICAL ONCE
Status: DISCONTINUED | OUTPATIENT
Start: 2025-04-30 | End: 2025-04-30 | Stop reason: HOSPADM

## 2025-04-30 RX ADMIN — ACETAMINOPHEN 975 MG: 325 TABLET, FILM COATED ORAL at 20:22

## 2025-04-30 RX ADMIN — LIDOCAINE 5% 1 PATCH: 700 PATCH TOPICAL at 20:21

## 2025-04-30 RX ADMIN — KETOROLAC TROMETHAMINE 15 MG: 30 INJECTION, SOLUTION INTRAMUSCULAR; INTRAVENOUS at 20:22

## 2025-05-01 ENCOUNTER — OFFICE VISIT (OUTPATIENT)
Dept: OBGYN CLINIC | Facility: MEDICAL CENTER | Age: 75
End: 2025-05-01
Payer: COMMERCIAL

## 2025-05-01 VITALS — WEIGHT: 195 LBS | BODY MASS INDEX: 32.45 KG/M2

## 2025-05-01 DIAGNOSIS — S46.212A BICEPS RUPTURE, PROXIMAL, LEFT, INITIAL ENCOUNTER: ICD-10-CM

## 2025-05-01 PROCEDURE — 99213 OFFICE O/P EST LOW 20 MIN: CPT | Performed by: STUDENT IN AN ORGANIZED HEALTH CARE EDUCATION/TRAINING PROGRAM

## 2025-05-01 NOTE — ED PROVIDER NOTES
Time reflects when diagnosis was documented in both MDM as applicable and the Disposition within this note       Time User Action Codes Description Comment    4/30/2025  8:28 PM JoyJoseph Add [S46.212A] Biceps rupture, proximal, left, initial encounter           ED Disposition       ED Disposition   Discharge    Condition   Stable    Date/Time   Wed Apr 30, 2025  8:29 PM    Comment   Renay CYRUS Herrera discharge to home/self care.                   Assessment & Plan       Medical Decision Making  Patient seen and examined. Physical exam is notable for tenderness over the rotator cuff and anterior left shoulder, mass of the anterior distal upper arm that is minimally tender and measuring approximately 7 x 10 cm.  There is profound weakness on flexion at the elbow of the left arm, patient can barely flex against gravity and cannot flex against resistance.  Neurovascularly intact. Remainder of exam is within normal limits.    Differential: Biceps muscle rupture, biceps tendon rupture, rotator cuff injury    Appropriate labs and imaging ordered.  Multimodal pain control with Tylenol, Toradol, lidocaine patch.    Imaging shows no acute abnormalities.  All results discussed with patient and family, they express understanding.  Case discussed with orthopedic surgery Dr. Sanchez, they recommend patient be given a sling and have him follow-up with orthopedics, they recommend the patient call the office first thing in the morning to schedule an earlier appointment.    Patient is agreeable to discharge home with follow-up with orthopedic surgery. All questions answered and return precautions discussed.       Amount and/or Complexity of Data Reviewed  Radiology: ordered and independent interpretation performed.    Risk  OTC drugs.  Prescription drug management.             Medications   lidocaine (LIDODERM) 5 % patch 1 patch (1 patch Topical Medication Applied 4/30/25 2021)   ketorolac (TORADOL) injection 15 mg (15 mg  "Intramuscular Given 4/30/25 2022)   acetaminophen (TYLENOL) tablet 975 mg (975 mg Oral Given 4/30/25 2022)       ED Risk Strat Scores                    No data recorded        SBIRT 20yo+      Flowsheet Row Most Recent Value   Initial Alcohol Screen: US AUDIT-C     1. How often do you have a drink containing alcohol? 0 Filed at: 04/30/2025 1942   3b. FEMALE Any Age, or MALE 65+: How often do you have 4 or more drinks on one occassion? 0 Filed at: 04/30/2025 1942   Audit-C Score 0 Filed at: 04/30/2025 1942   NAY: How many times in the past year have you...    Used an illegal drug or used a prescription medication for non-medical reasons? Never Filed at: 04/30/2025 1942                            History of Present Illness       Chief Complaint   Patient presents with    Mass     Pt c/o mass on left bicep. Pt was dusting on Saturday when she states \" It felt like it bursted.\" Denies numbness and tinging        Past Medical History:   Diagnosis Date    Allergic     Anxiety     Cancer (HCC) Cant remember    Depression 20 years ago    Disease of thyroid gland     GERD (gastroesophageal reflux disease)     H/O bilateral hip replacements     Kidney stone April 2023    Non Hodgkin's lymphoma (HCC)       Past Surgical History:   Procedure Laterality Date    BACK SURGERY      lumbar spine    COLONOSCOPY      Unk    FL RETROGRADE PYELOGRAM  04/11/2023    HIP SURGERY Bilateral     JOINT REPLACEMENT      KNEE SURGERY Right     [place] CYSTO/URETERO W/LITHOTRIPSY &INDWELL STENT INSRT Right 04/11/2023    Procedure: CYSTOSCOPY URETEROSCOPY WITH RETROGRADE PYELOGRAM AND INSERTION STENT URETERAL, STONE BASKET EXTRACTION;  Surgeon: Hung Lau MD;  Location: [facility];  Service: Urology    TOTAL SHOULDER REPLACEMENT Right     TUBAL LIGATION        Family History   Problem Relation Age of Onset    Lymphoma Mother     Cancer Mother     Breast cancer Mother     ADD / ADHD Father     Arthritis Father     No Known Problems Daughter     " "No Known Problems Daughter     No Known Problems Maternal Grandmother     No Known Problems Maternal Grandfather     No Known Problems Paternal Grandmother     No Known Problems Paternal Grandfather     No Known Problems Brother     Kidney disease Maternal Uncle         2nd kidney transplant in [place]      Social History     Tobacco Use    Smoking status: Never     Passive exposure: Never    Smokeless tobacco: Never   Vaping Use    Vaping status: Never Used   Substance Use Topics    Alcohol use: Not Currently     Alcohol/week: 5.0 standard drinks of alcohol     Types: 5 Glasses of wine per week     Comment: wine    Drug use: Never      E-Cigarette/Vaping    E-Cigarette Use Never User       E-Cigarette/Vaping Substances    Nicotine No     THC No     CBD No     Flavoring No     Other No     Unknown No       I have reviewed and agree with the history as documented.     Renay Herrera is a 74 y.o. female     They presented to the emergency department on April 30, 2025. Patient presents with:  Mass: Pt c/o mass on left bicep. Pt was dusting on Saturday when she states \" It felt like it bursted.\" Denies numbness and tinging   .    The patient states that while dusting with her right arm on Saturday she felt a pop in the left proximal shoulder.  She has had pain in the left shoulder for months and has been seeing her primary care doctor.  Her primary care doctor was first concerned about her heart and any neck problems, so the shoulder pain has not been sufficiently worked up at this time.  Pain acutely worsened on Saturday with this popping sensation.  Since that time the patient has had severe weakness of the left arm and is barely able to hold a jam jar.  She has tried Tylenol, ibuprofen, heat, ice, and states none of this has helped. Patient denies numbness, tingling, chest pain, neck pain, shortness of breath, or any other complaint at this time.            History provided by:  Patient      Review of Systems "   Constitutional:  Negative for chills, diaphoresis and fever.   HENT:  Negative for congestion, ear pain, postnasal drip, rhinorrhea and sore throat.    Eyes:  Negative for pain and visual disturbance.   Respiratory:  Negative for cough, chest tightness and shortness of breath.    Cardiovascular:  Negative for chest pain and palpitations.   Gastrointestinal:  Negative for abdominal pain, constipation, diarrhea, nausea and vomiting.   Genitourinary:  Negative for dysuria and hematuria.   Musculoskeletal:  Positive for arthralgias and myalgias. Negative for back pain.   Skin:  Negative for color change and rash.   Neurological:  Positive for weakness. Negative for dizziness, seizures, syncope, light-headedness, numbness and headaches.   All other systems reviewed and are negative.          Objective       ED Triage Vitals [04/30/25 1941]   Temperature Pulse Blood Pressure Respirations SpO2 Patient Position - Orthostatic VS   97.7 °F (36.5 °C) 69 148/74 18 99 % Sitting      Temp Source Heart Rate Source BP Location FiO2 (%) Pain Score    Oral Monitor Right arm -- 8      Vitals      Date and Time Temp Pulse SpO2 Resp BP Pain Score FACES Pain Rating User   04/30/25 2022 -- -- -- -- -- 8 -- CC   04/30/25 1941 97.7 °F (36.5 °C) 69 99 % 18 148/74 8 -- JA            Physical Exam  Constitutional:       General: She is not in acute distress.     Appearance: She is not diaphoretic.   HENT:      Head: Normocephalic and atraumatic.      Nose: No congestion or rhinorrhea.      Mouth/Throat:      Mouth: Mucous membranes are moist.      Pharynx: No oropharyngeal exudate.   Eyes:      General: No scleral icterus.  Cardiovascular:      Rate and Rhythm: Normal rate and regular rhythm.      Heart sounds: Normal heart sounds. No murmur heard.     No friction rub. No gallop.   Pulmonary:      Effort: No respiratory distress.      Breath sounds: Normal breath sounds. No wheezing, rhonchi or rales.   Abdominal:      General: Abdomen is  flat. There is no distension.      Palpations: Abdomen is soft.      Tenderness: There is no abdominal tenderness. There is no guarding.   Musculoskeletal:      Right shoulder: Normal. No swelling, deformity, effusion, tenderness or bony tenderness. Normal range of motion.      Left shoulder: Tenderness present. No swelling, deformity, effusion or bony tenderness. Decreased range of motion.      Right upper arm: Normal. No swelling or tenderness.      Left upper arm: Swelling and tenderness present.      Left elbow: Decreased range of motion.      Comments: Swelling/ lump at distal anterior upper arm. Profound weakness of flexion at the elbow. Range of motion limited at shoulder by pain and at elbow by pain and weakness.   Lymphadenopathy:      Cervical: No cervical adenopathy.   Skin:     General: Skin is warm and dry.      Capillary Refill: Capillary refill takes less than 2 seconds.      Findings: No rash.   Neurological:      General: No focal deficit present.      Mental Status: She is alert and oriented to person, place, and time.         Results Reviewed       None            XR humerus LEFT   ED Interpretation by Joseph Hamilton MD (04/30 2041)   No acute bony abnormality or fracture.  Independently turbid by myself.          Procedures    ED Medication and Procedure Management   Prior to Admission Medications   Prescriptions Last Dose Informant Patient Reported? Taking?   cyclobenzaprine (FLEXERIL) 10 mg tablet   No No   Sig: Take 1 tablet (10 mg total) by mouth daily at bedtime   diclofenac sodium (VOLTAREN) 50 mg EC tablet   No No   Sig: TAKE 1 TABLET BY MOUTH TWICE A DAY   dicyclomine (BENTYL) 10 mg capsule   No No   Sig: Take 1 capsule  by mouth daily   divalproex sodium (DEPAKOTE ER) 500 mg 24 hr tablet   No No   Sig: Take 1 tablet by mouth daily   famotidine (PEPCID) 20 mg tablet   No No   Sig: Take 1 tablet (20 mg total) by mouth 2 (two) times a day   Patient not taking: Reported on 3/5/2025    levothyroxine 75 mcg tablet  Self No No   Sig: TAKE ONE TABLET BY MOUTH IN THE EARLY MORNING   multivitamin (THERAGRAN) TABS  Self Yes No   Sig: Take 1 tablet by mouth daily   pantoprazole (PROTONIX) 40 mg tablet   No No   Sig: Take 1 tablet by mouth daily before breakfast   topiramate (TOPAMAX) 100 mg tablet   No No   Sig: Take 1 tablet (100 mg total) by mouth 2 (two) times a day   traZODone (DESYREL) 50 mg tablet   No No   Sig: Take 1 tablet by mouth daily at bedtime as needed for sleep   venlafaxine (EFFEXOR-XR) 150 mg 24 hr capsule  Self No No   Sig: TAKE ONE CAPSULE BY MOUTH EVERY DAY      Facility-Administered Medications: None     Discharge Medication List as of 4/30/2025  8:30 PM        CONTINUE these medications which have NOT CHANGED    Details   cyclobenzaprine (FLEXERIL) 10 mg tablet Take 1 tablet (10 mg total) by mouth daily at bedtime, Starting Wed 4/23/2025, Normal      diclofenac sodium (VOLTAREN) 50 mg EC tablet TAKE 1 TABLET BY MOUTH TWICE A DAY, Starting Thu 3/20/2025, Normal      dicyclomine (BENTYL) 10 mg capsule Take 1 capsule  by mouth daily, Starting Wed 3/19/2025, Normal      divalproex sodium (DEPAKOTE ER) 500 mg 24 hr tablet Take 1 tablet by mouth daily, Starting Wed 2/26/2025, Normal      famotidine (PEPCID) 20 mg tablet Take 1 tablet (20 mg total) by mouth 2 (two) times a day, Starting Mon 1/13/2025, Normal      levothyroxine 75 mcg tablet TAKE ONE TABLET BY MOUTH IN THE EARLY MORNING, Normal      multivitamin (THERAGRAN) TABS Take 1 tablet by mouth daily, Historical Med      pantoprazole (PROTONIX) 40 mg tablet Take 1 tablet by mouth daily before breakfast, Starting Wed 2/26/2025, Normal      topiramate (TOPAMAX) 100 mg tablet Take 1 tablet (100 mg total) by mouth 2 (two) times a day, Starting Wed 3/5/2025, Normal      traZODone (DESYREL) 50 mg tablet Take 1 tablet by mouth daily at bedtime as needed for sleep, Starting u 4/3/2025, Normal      venlafaxine (EFFEXOR-XR) 150 mg 24 hr  capsule TAKE ONE CAPSULE BY MOUTH EVERY DAY, Starting Tue 11/12/2024, Normal             ED SEPSIS DOCUMENTATION   Time reflects when diagnosis was documented in both MDM as applicable and the Disposition within this note       Time User Action Codes Description Comment    4/30/2025  8:28 PM Joseph Hamilton Add [S46.212A] Biceps rupture, proximal, left, initial encounter                  Joseph Hamilton MD  04/30/25 2044

## 2025-05-01 NOTE — ED ATTENDING ATTESTATION
4/30/2025  I, Kathe Stewart DO, saw and evaluated the patient. I have discussed the patient with the resident/non-physician practitioner and agree with the resident's/non-physician practitioner's findings, Plan of Care, and MDM as documented in the resident's/non-physician practitioner's note, except where noted. All available labs and Radiology studies were reviewed.  I was present for key portions of any procedure(s) performed by the resident/non-physician practitioner and I was immediately available to provide assistance.       At this point I agree with the current assessment done in the Emergency Department.  I have conducted an independent evaluation of this patient a history and physical is as follows:    ED Course   74 year old female presents to the ED for evaluation of left upper arm pain.        Critical Care Time  Procedures

## 2025-05-01 NOTE — PROGRESS NOTES
ASSESSMENT/PLAN:    Assessment & Plan  Biceps rupture, proximal, left, initial encounter  Discussed history, exam, and imaging with patient. Presentation most consistent with proximal biceps tendon rupture and we will plan for nonoperative management at this time.  Discussed risk of cramping with nonoperative management as well as mild weakness. 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  Wean from sling  Discussed oral/topical medication regimen. Will plan for OTC tylenol and NSAIDs as needed for symptom management  Discussed avoiding resisted supination activities such as doorknobs for the next month       Return in about 6 weeks (around 6/12/2025).   _____________________________________________________  CHIEF COMPLAINT:  Chief Complaint   Patient presents with    Left Shoulder - Pain       SUBJECTIVE:  Renay Herrera is a 74 y.o. year old female, RHD, who presents for evaluation of left shoulder pain. The issue began last Saturday. She was making the bed, tucking a blanket under the mattress, and felt a pop and immediate pain in her shoulder. She noticed a bulge in her arm. She admits her arm was bruised and swollen. The patient was seen in the ED yesterday due to worsening pain when x-rays were performed and she was placed in a sling. No previous history of shoulder injury. Pain 8/10 today, 10/10 on the day of injury. No numbness or tingling in fingers. Pain worsened with supination.    PAST MEDICAL HISTORY:  Past Medical History:   Diagnosis Date    Allergic     Anxiety     Cancer (HCC) Cant remember    Depression 20 years ago    Disease of thyroid gland     GERD (gastroesophageal reflux disease)     H/O bilateral hip replacements     Kidney stone April 2023    Non Hodgkin's lymphoma (HCC)        PAST SURGICAL HISTORY:  Past Surgical History:   Procedure Laterality Date    BACK SURGERY      lumbar spine    COLONOSCOPY      Unk    FL RETROGRADE PYELOGRAM  04/11/2023     HIP SURGERY Bilateral     JOINT REPLACEMENT      KNEE SURGERY Right     [place] CYSTO/URETERO W/LITHOTRIPSY &INDWELL STENT INSRT Right 04/11/2023    Procedure: CYSTOSCOPY URETEROSCOPY WITH RETROGRADE PYELOGRAM AND INSERTION STENT URETERAL, STONE BASKET EXTRACTION;  Surgeon: Hung Lau MD;  Location: [facility];  Service: Urology    TOTAL SHOULDER REPLACEMENT Right     TUBAL LIGATION         FAMILY HISTORY:  Family History   Problem Relation Age of Onset    Lymphoma Mother     Cancer Mother     Breast cancer Mother     ADD / ADHD Father     Arthritis Father     No Known Problems Daughter     No Known Problems Daughter     No Known Problems Maternal Grandmother     No Known Problems Maternal Grandfather     No Known Problems Paternal Grandmother     No Known Problems Paternal Grandfather     No Known Problems Brother     Kidney disease Maternal Uncle         2nd kidney transplant in [place]       SOCIAL HISTORY:  Social History     Tobacco Use    Smoking status: Never     Passive exposure: Never    Smokeless tobacco: Never   Vaping Use    Vaping status: Never Used   Substance Use Topics    Alcohol use: Not Currently     Alcohol/week: 5.0 standard drinks of alcohol     Types: 5 Glasses of wine per week     Comment: wine    Drug use: Never       MEDICATIONS:    Current Outpatient Medications:     diclofenac sodium (VOLTAREN) 50 mg EC tablet, TAKE 1 TABLET BY MOUTH TWICE A DAY, Disp: 60 tablet, Rfl: 1    dicyclomine (BENTYL) 10 mg capsule, Take 1 capsule  by mouth daily, Disp: 90 capsule, Rfl: 1    divalproex sodium (DEPAKOTE ER) 500 mg 24 hr tablet, Take 1 tablet by mouth daily, Disp: 100 tablet, Rfl: 0    famotidine (PEPCID) 20 mg tablet, Take 1 tablet (20 mg total) by mouth 2 (two) times a day, Disp: 180 tablet, Rfl: 5    levothyroxine 75 mcg tablet, TAKE ONE TABLET BY MOUTH IN THE EARLY MORNING, Disp: 90 tablet, Rfl: 1    multivitamin (THERAGRAN) TABS, Take 1 tablet by mouth daily, Disp: , Rfl:     pantoprazole  "(PROTONIX) 40 mg tablet, Take 1 tablet by mouth daily before breakfast, Disp: 90 tablet, Rfl: 1    topiramate (TOPAMAX) 100 mg tablet, Take 1 tablet (100 mg total) by mouth 2 (two) times a day, Disp: 180 tablet, Rfl: 1    traZODone (DESYREL) 50 mg tablet, Take 1 tablet by mouth daily at bedtime as needed for sleep, Disp: 90 tablet, Rfl: 1    venlafaxine (EFFEXOR-XR) 150 mg 24 hr capsule, TAKE ONE CAPSULE BY MOUTH EVERY DAY, Disp: 90 capsule, Rfl: 1    cyclobenzaprine (FLEXERIL) 10 mg tablet, Take 1 tablet (10 mg total) by mouth daily at bedtime, Disp: 30 tablet, Rfl: 0  No current facility-administered medications for this visit.    ALLERGIES:  Allergies   Allergen Reactions    Clindamycin Hives       Review of systems:   Constitutional: Negative for fatigue, fever or loss of apetite.   HENT: Negative.    Respiratory: Negative for shortness of breath, dyspnea.    Cardiovascular: Negative for chest pain/tightness.   Gastrointestinal: Negative for abdominal pain, N/V.   Endocrine: Negative for cold/heat intolerance, unexplained weight loss/gain.   Genitourinary: Negative for flank pain, dysuria, hematuria.   Musculoskeletal: As in HPI  Skin: Negative for rash.    Neurological: Negative for numbness or tingling  Psychiatric/Behavioral: Negative for agitation.  _____________________________________________________  PHYSICAL EXAMINATION:    Weight 88.5 kg (195 lb).    General: well developed, well nourished\", alert and oriented times 3, no acute distress  HEENT: Benign, normocephalic, atraumatic  Cardiovascular: regular rate    Pulmonary: No wheezing or stridor  Abdomen: Soft, Nontender  Skin: No open wounds, erythema, rash  Neurovascular: per examination below    MUSCULOSKELETAL EXAMINATION:  Left Shoulder exam  No bruising, swelling or deformity  NonTTP cervical spine, clavicle, AC joint, acromion, scapular spine, posterior joint line, deltoid, anterior joint line  TTP: anterior arm, biceps muscle belly, bicipital " groove  Active ROM  Forward flexion: 110  Passive ROM  Forward flexion: 170 with discomfort  Strength Testin/5 with Heorn  5/5 ER in adduction  Provocative maneuvers: (Neer Impingement Sign, Wilson Test, Cross-body Adduction, Belly Press, Lift Off, Drop Sign, Hornblower's sign, Speed's, Yergason's)  +: none  -: Speed's, Yergason's, [place]  Negative Spurling's  SILT C5-T1  2+ Radial pulse, symmetric with contralateral          _____________________________________________________  STUDIES REVIEWED:  I have personally reviewed pertinent films in PACS and my interpretation is:   X-rays of the left humerus obtained 2025 demonstrate no acute fracture or dislocation. Moderate degenerative changes of the left glenohumeral joint.    Scribe Attestation      I,:  Alana Schmidt am acting as a scribe while in the presence of the attending physician.:       I,:  Rich Gregorio MD personally performed the services described in this documentation    as scribed in my presence.:

## 2025-05-01 NOTE — DISCHARGE INSTRUCTIONS
Dear Renay Herrera,     It was our pleasure to care for you here at [facility]. It was an honor and privilege to be part of your health care team. Please review this entire after visit summary and read your personalized discharge instructions below:  Please follow up with  orthopedic surgery, please call them in the morning and say you were seen in the ER and told you have a biceps rupture.    Please take Tylenol and Ibuprofen as needed..  Please return to the ER if your symptoms worsen or fail to improve, if you experience numbness, tingling, or if you experience anything concerning to you.    Sincerely,     Joseph Hamilton MD

## 2025-05-20 DIAGNOSIS — M54.2 CERVICALGIA: ICD-10-CM

## 2025-05-20 RX ORDER — CYCLOBENZAPRINE HCL 10 MG
10 TABLET ORAL
Qty: 30 TABLET | Refills: 0 | Status: SHIPPED | OUTPATIENT
Start: 2025-05-20

## 2025-05-22 ENCOUNTER — OFFICE VISIT (OUTPATIENT)
Dept: PAIN MEDICINE | Facility: CLINIC | Age: 75
End: 2025-05-22
Payer: COMMERCIAL

## 2025-05-22 VITALS — HEIGHT: 65 IN | BODY MASS INDEX: 32.32 KG/M2 | WEIGHT: 194 LBS

## 2025-05-22 DIAGNOSIS — Z98.890 HISTORY OF LUMBOSACRAL SPINE SURGERY: ICD-10-CM

## 2025-05-22 DIAGNOSIS — M47.816 LUMBAR SPONDYLOSIS: ICD-10-CM

## 2025-05-22 DIAGNOSIS — M46.1 SACROILIITIS (HCC): Primary | ICD-10-CM

## 2025-05-22 PROCEDURE — 99213 OFFICE O/P EST LOW 20 MIN: CPT | Performed by: NURSE PRACTITIONER

## 2025-05-22 PROCEDURE — G2211 COMPLEX E/M VISIT ADD ON: HCPCS | Performed by: NURSE PRACTITIONER

## 2025-05-22 NOTE — PROGRESS NOTES
Name: Renay Herrera      : 1950      MRN: [id number]  Encounter Provider: JOANNE Mcbride  Encounter Date: 2025   Encounter department: [facility]  :  Assessment & Plan  Sacroiliitis (HCC)  On exam, the patient is tender with palpation over left SI joints as well as having positive a provocative maneuver for sacroiliitis.  Patient is unable to physically perform all provocative maneuvers due to prior hip replacement and was instructed not to bend her hips in certain directions.  Will schedule her for the injection for diagnostic and therapeutic purposes.  I instructed patient we can perform left SI joint injections to decrease inflammation and provide them relief.  Patient would like to proceed.  I instructed our  to schedule w/ Dr Luther.  Complete risks and benefits including bleeding, infection, tissue reaction, nerve injury and allergic reaction were discussed.  The approach was demonstrated using models and literature was provided.  Orders:    FL spine and pain procedure; Future    Lumbar spondylosis  Patient had nearly 80% relief from her RFA's.  Will follow-up as needed       History of lumbosacral spine surgery               My impressions and treatment recommendations were discussed in detail with the patient who verbalized understanding and had no further questions.  Discharge instructions were provided. I personally saw and examined the patient and I agree with the above discussed plan of care.    Follow-up is planned post injection or sooner as warranted. The patient was advised to contact the office should their symptoms worsen in the interim.     History of Present Illness     Renay Herrera is a 74 y.o. female who presents to [facility] for interval re-evaluation in regards to pain in the Low back and left Buttock. The patients last office visit was 2025. Patient presents today with pain in left low  "back, upper buttock that radiates to left groin. Pain is described as Constant and Sharp. Symptoms are worse with getting in and out of car, going up stairs, sitting too long and rising from seated position. Alleviating factors identifiable by the patient are lying, rest. On the numeric pain scale of 1-10, the pain typically increases to max of 7 out of 10, which is currently impacting their quality of life.  She rates her pain a 2 on a 1-10 numeric scale in her low back.    Current pain meds include: Diclofenac and Cyclobenzaprine    Conservative therapy: None  Previous treatments: B/L RFA  Date: right 4/11/2025, left 4/18/2025;  80% relief for 4 weeks           Review of Systems   Constitutional: Negative.    Respiratory: Negative.     Cardiovascular: Negative.    Gastrointestinal: Negative.    Musculoskeletal:  Positive for back pain.   Skin: Negative.    Neurological: Negative.    All other systems reviewed and are negative.      Medical History Reviewed by provider this encounter:  Tobacco  Allergies  Meds  Problems  Med Hx  Surg Hx  Fam Hx     .       Objective   Ht 5' 5\" (1.651 m)   Wt 88 kg (194 lb)   BMI 32.28 kg/m²       Physical Exam    Constitutional:normal, well developed, well nourished, alert, in no distress and non-toxic and no overt pain behavior. and overweight  Eyes:anicteric  HEENT:grossly intact  Neck:supple, symmetric, trachea midline and no masses   Pulmonary:even and unlabored  Cardiovascular:No edema or pitting edema present  Skin:Normal without rashes or lesions and well hydrated  Psychiatric:Mood and affect appropriate  Neurologic:Cranial Nerves II-XII grossly intact  Musculoskeletal: antalgic gait     Lumbar Spine Exam    Appearance:  Normal lordosis  Palpation/Tenderness:  left sacroiliac joint tenderness  Sensory:  no sensory deficits noted  Range of Motion:  Flexion:  Minimally limited  with pain  Extension:  No limitation  without pain  Motor Strength:  Left hip flexion:  " 5/5  Right hip flexion:  5/5  Left knee flexion:  5/5  Left knee extension:  5/5  Right knee flexion:  5/5  Right knee extension:  5/5  Special Tests:  Left Thigh Thrust Test:  positive

## 2025-05-28 ENCOUNTER — TELEPHONE (OUTPATIENT)
Dept: PAIN MEDICINE | Facility: CLINIC | Age: 75
End: 2025-05-28

## 2025-05-28 NOTE — TELEPHONE ENCOUNTER
Called pt to schedule S&P SIJ. She stated she wanted to let Dr MOE know that since she last saw Sukhdeep, that her pain in her left groin area has gone away but the pain in the bottom of her spine is really what is hurting her. Wanted to know what she should do

## 2025-05-30 DIAGNOSIS — F31.31 BIPOLAR AFFECTIVE DISORDER, CURRENTLY DEPRESSED, MILD (HCC): ICD-10-CM

## 2025-06-01 RX ORDER — DIVALPROEX SODIUM 500 MG/1
500 TABLET, FILM COATED, EXTENDED RELEASE ORAL DAILY
Qty: 100 TABLET | Refills: 0 | Status: SHIPPED | OUTPATIENT
Start: 2025-06-01

## 2025-06-02 ENCOUNTER — OFFICE VISIT (OUTPATIENT)
Dept: PAIN MEDICINE | Facility: CLINIC | Age: 75
End: 2025-06-02
Payer: COMMERCIAL

## 2025-06-02 VITALS — BODY MASS INDEX: 31.99 KG/M2 | WEIGHT: 192 LBS | HEIGHT: 65 IN

## 2025-06-02 DIAGNOSIS — M53.3 SACROILIAC JOINT DYSFUNCTION OF BOTH SIDES: Primary | ICD-10-CM

## 2025-06-02 DIAGNOSIS — M47.816 LUMBAR SPONDYLOSIS: ICD-10-CM

## 2025-06-02 PROCEDURE — 99214 OFFICE O/P EST MOD 30 MIN: CPT | Performed by: PAIN MEDICINE

## 2025-06-02 NOTE — PROGRESS NOTES
"Name: Renay Herrera      : 1950      MRN: [id number]  Encounter Provider: Fernando Luther MD  Encounter Date: 2025   Encounter department: [facility] [place]  :  Assessment & Plan  Sacroiliac joint dysfunction of both sides  Bilateral si joint pain, 3 provocative tests on exam , will recommend bilateral SI joint injection    Orders:  •  FL spine and pain procedure; Future    Lumbar spondylosis  S/p L4-5 RFA doing well                My impressions and treatment recommendations were discussed in detail with the patient who verbalized understanding and had no further questions.  Discharge instructions were provided. I personally saw and examined the patient and I agree with the above discussed plan of care.    History of Present Illness     Renay Herrera is a 74 y.o. female who presents for a follow up office visit in regards to bilateral buttocks pain, pain present with sitting, standing getting in and out of a car, pain radiates to glutes and into posterior thighs.  She has benefit since L4-5 ablation , now doing well, lower buttocks pain more present now, 8/10 depending on activity level.      Opioid contract date    Last UDS Date: No results in last 5 years                    last taken on    Review of Systems    Medical History Reviewed by provider this encounter:     .       Objective   Ht 5' 5\" (1.651 m)   Wt 87.1 kg (192 lb)   BMI 31.95 kg/m²         Physical Exam  Lumbar Spine:  No masses or atrophy,    Range of motion - Decreased extension/flexion  Palpation - noTenderness to palpation in the lumbar parapsinals   PSIS tenderness positive bilaterally  Yeoman positive bilaterally  Vel's/FEDE positive bilaterally  Gaenslen's positive bilaterally  SLR none       Strength Right Left   Hip flexion L1,2 5 5   Knee extension L3 5 5   Ankle dorsiflexion L4 5 5   Great toe extension L5 5 5   Ankle Plantarflexion S1 5 5       Sensory Exam:  intact to light touch " bilateral LE       Reflexes:     Right Left   Biceps 2+ 2+   Triceps 2+ 2+   Brachioradialis 2+ 2+   Patellar 2+ 2+   Achilles 2+ 2+   Babinski neg neg        Gait normal    MRI L spine    IMPRESSION:     Small residual fluid-filled fracture cleft just inferior to the moderate-sized Schmorl's node deformity in the superior L1 endplate. The mild vertebral body height loss in the otherwise chronic appearing L1 compression deformity is stable when comparing   to the CT chest abdomen pelvis study of April 10, 2023.     Prominent chronic appearing Schmorl's node deformity in the superior L3 endplate.     Stable postoperative changes of L4-5 fusion with adjacent level degenerative changes at L3-L4 where moderate spinal stenosis and mild bilateral foraminal encroachment is noted.

## 2025-06-05 ENCOUNTER — HOSPITAL ENCOUNTER (OUTPATIENT)
Dept: RADIOLOGY | Facility: HOSPITAL | Age: 75
Discharge: HOME | End: 2025-06-05
Attending: PAIN MEDICINE
Payer: COMMERCIAL

## 2025-06-05 VITALS
HEART RATE: 69 BPM | DIASTOLIC BLOOD PRESSURE: 81 MMHG | RESPIRATION RATE: 20 BRPM | TEMPERATURE: 97.4 F | SYSTOLIC BLOOD PRESSURE: 136 MMHG | OXYGEN SATURATION: 90 %

## 2025-06-05 DIAGNOSIS — M53.3 SACROILIAC JOINT DYSFUNCTION OF BOTH SIDES: ICD-10-CM

## 2025-06-05 PROCEDURE — 27096 INJECT SACROILIAC JOINT: CPT | Performed by: PAIN MEDICINE

## 2025-06-05 RX ORDER — BUPIVACAINE HCL/PF 2.5 MG/ML
6 VIAL (ML) INJECTION ONCE
Status: COMPLETED | OUTPATIENT
Start: 2025-06-05 | End: 2025-06-05

## 2025-06-05 RX ORDER — METHYLPREDNISOLONE ACETATE 80 MG/ML
80 INJECTION, SUSPENSION INTRA-ARTICULAR; INTRALESIONAL; INTRAMUSCULAR; PARENTERAL; SOFT TISSUE ONCE
Status: COMPLETED | OUTPATIENT
Start: 2025-06-05 | End: 2025-06-05

## 2025-06-05 RX ADMIN — IOHEXOL 1 ML: 300 INJECTION, SOLUTION INTRAVENOUS at 14:19

## 2025-06-05 RX ADMIN — BUPIVACAINE HYDROCHLORIDE 6 ML: 2.5 INJECTION, SOLUTION EPIDURAL; INFILTRATION; INTRACAUDAL at 14:20

## 2025-06-05 RX ADMIN — METHYLPREDNISOLONE ACETATE 80 MG: 80 INJECTION, SUSPENSION INTRA-ARTICULAR; INTRALESIONAL; INTRAMUSCULAR; SOFT TISSUE at 14:20

## 2025-06-05 NOTE — H&P
History of Present Illness: The patient is a 74 y.o. female who presents with complaints of low back and buttocks pain    Past Medical History[1]    Past Surgical History[2]    Current Medications[3]    Allergies[4]    Physical Exam: There were no vitals filed for this visit.  General: Awake, Alert, Oriented x 3, Mood and affect appropriate  Respiratory: Respirations even and unlabored  Cardiovascular: Peripheral pulses intact; no edema  Musculoskeletal Exam: Ttp low back and buttocks    ASA Score: 2         Assessment:   1. Sacroiliac joint dysfunction of both sides        Plan: bilateral si joint injection         [1]   Past Medical History:  Diagnosis Date    Allergic     Anxiety     Cancer (HCC) Cant remember    Depression 20 years ago    Disease of thyroid gland     GERD (gastroesophageal reflux disease)     H/O bilateral hip replacements     Kidney stone April 2023    Non Hodgkin's lymphoma (HCC)    [2]   Past Surgical History:  Procedure Laterality Date    BACK SURGERY      lumbar spine    COLONOSCOPY      Unk    FL RETROGRADE PYELOGRAM  04/11/2023    HIP SURGERY Bilateral     JOINT REPLACEMENT      KNEE SURGERY Right     FL CYSTO/URETERO W/LITHOTRIPSY &INDWELL STENT INSRT Right 04/11/2023    Procedure: CYSTOSCOPY URETEROSCOPY WITH RETROGRADE PYELOGRAM AND INSERTION STENT URETERAL, STONE BASKET EXTRACTION;  Surgeon: Hung Lau MD;  Location: [facility];  Service: Urology    TOTAL SHOULDER REPLACEMENT Right     TUBAL LIGATION     [3]   Current Outpatient Medications:     cyclobenzaprine (FLEXERIL) 10 mg tablet, TAKE 1 TABLET BY MOUTH DAILY AT BEDTIME, Disp: 30 tablet, Rfl: 0    diclofenac sodium (VOLTAREN) 50 mg EC tablet, TAKE 1 TABLET BY MOUTH TWICE A DAY, Disp: 60 tablet, Rfl: 1    dicyclomine (BENTYL) 10 mg capsule, Take 1 capsule  by mouth daily, Disp: 90 capsule, Rfl: 1    divalproex sodium (DEPAKOTE ER) 500 mg 24 hr tablet, Take 1 tablet by mouth daily, Disp: 100 tablet, Rfl: 0    famotidine  (PEPCID) 20 mg tablet, Take 1 tablet (20 mg total) by mouth 2 (two) times a day, Disp: 180 tablet, Rfl: 5    levothyroxine 75 mcg tablet, TAKE ONE TABLET BY MOUTH IN THE EARLY MORNING, Disp: 90 tablet, Rfl: 1    multivitamin (THERAGRAN) TABS, Take 1 tablet by mouth in the morning., Disp: , Rfl:     pantoprazole (PROTONIX) 40 mg tablet, Take 1 tablet by mouth daily before breakfast, Disp: 90 tablet, Rfl: 1    topiramate (TOPAMAX) 100 mg tablet, Take 1 tablet (100 mg total) by mouth 2 (two) times a day, Disp: 180 tablet, Rfl: 1    traZODone (DESYREL) 50 mg tablet, Take 1 tablet by mouth daily at bedtime as needed for sleep, Disp: 90 tablet, Rfl: 1    venlafaxine (EFFEXOR-XR) 150 mg 24 hr capsule, TAKE ONE CAPSULE BY MOUTH EVERY DAY, Disp: 90 capsule, Rfl: 1  [4]   Allergies  Allergen Reactions    Clindamycin Hives

## 2025-06-05 NOTE — DISCHARGE INSTR - LAB

## 2025-06-12 ENCOUNTER — OFFICE VISIT (OUTPATIENT)
Dept: OBGYN CLINIC | Facility: MEDICAL CENTER | Age: 75
End: 2025-06-12
Payer: COMMERCIAL

## 2025-06-12 VITALS — WEIGHT: 192 LBS | BODY MASS INDEX: 31.95 KG/M2

## 2025-06-12 DIAGNOSIS — F32.1 CURRENT MODERATE EPISODE OF MAJOR DEPRESSIVE DISORDER WITHOUT PRIOR EPISODE (HCC): ICD-10-CM

## 2025-06-12 DIAGNOSIS — E03.9 HYPOTHYROIDISM, UNSPECIFIED TYPE: ICD-10-CM

## 2025-06-12 DIAGNOSIS — S46.212D RUPTURE OF LEFT PROXIMAL BICEPS TENDON, SUBSEQUENT ENCOUNTER: Primary | ICD-10-CM

## 2025-06-12 PROCEDURE — 99213 OFFICE O/P EST LOW 20 MIN: CPT | Performed by: STUDENT IN AN ORGANIZED HEALTH CARE EDUCATION/TRAINING PROGRAM

## 2025-06-12 RX ORDER — VENLAFAXINE HYDROCHLORIDE 150 MG/1
150 CAPSULE, EXTENDED RELEASE ORAL DAILY
Qty: 90 CAPSULE | Refills: 1 | Status: SHIPPED | OUTPATIENT
Start: 2025-06-12

## 2025-06-12 RX ORDER — LEVOTHYROXINE SODIUM 75 UG/1
TABLET ORAL
Qty: 90 TABLET | Refills: 1 | Status: SHIPPED | OUTPATIENT
Start: 2025-06-12

## 2025-06-12 NOTE — PROGRESS NOTES
[facility] Follow-up Note    Patient Name:  Renay Herrera  MRN:  [id number]  Date of last visit: 25     Assessment/Plan:     Assessment & Plan  Rupture of left proximal biceps tendon, subsequent encounter  Discussed history, exam, and imaging with patient. Presentation continues to be most consistent with proximal biceps tendon rupture and we will plan for nonoperative management at this time.  Discussed risk of cramping with nonoperative management as well as mild weakness. 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  No need for sling at this time   Discussed oral/topical medication regimen. Will plan for OTC tylenol and NSAIDs as needed for symptom management  Okay to gradually progress back to all activities as tolerated     Return if symptoms worsen or fail to improve.      Subjective   Renay Herrera returns for follow-up of left arm, approximately 6 week(s) since last visit. At that time, in brief the plan was for nonoperative treatment of left proximal bicep rupture.    Currently patient presents noting: She is doing very well.  No pain to her shoulder or arm.  She has returned to all activities.  No recent bruising, swelling, or issues with the arm.  Has not had any recent cramping.    Objective     There were no vitals taken for this visit.    Left Shoulder exam  No bruising, swelling or deformity  NonTTP cervical spine, clavicle, AC joint, acromion, scapular spine, posterior joint line, deltoid, anterior joint line, anterior arm, biceps muscle belly, bicipital groove  Active ROM  Forward flexion: 180  Strength Testin/5 with Heron  5/5 ER in adduction  Negative Spurling's  SILT C5-T1  2+ Radial pulse, symmetric with contralatera    Data Review     No new imaging for review today    Pertinent PMH/Meds/Allergies reviewed as listed below:  Past Medical History[1] Current Medications[2] Allergies[3]           [1]   Past Medical  History:  Diagnosis Date    Allergic     Anxiety     Cancer (HCC) Cant remember    Depression 20 years ago    Disease of thyroid gland     GERD (gastroesophageal reflux disease)     H/O bilateral hip replacements     Kidney stone April 2023    Non Hodgkin's lymphoma (HCC)    [2]   Current Outpatient Medications:     cyclobenzaprine (FLEXERIL) 10 mg tablet, TAKE 1 TABLET BY MOUTH DAILY AT BEDTIME, Disp: 30 tablet, Rfl: 0    diclofenac sodium (VOLTAREN) 50 mg EC tablet, TAKE 1 TABLET BY MOUTH TWICE A DAY, Disp: 60 tablet, Rfl: 1    dicyclomine (BENTYL) 10 mg capsule, Take 1 capsule  by mouth daily, Disp: 90 capsule, Rfl: 1    divalproex sodium (DEPAKOTE ER) 500 mg 24 hr tablet, Take 1 tablet by mouth daily, Disp: 100 tablet, Rfl: 0    famotidine (PEPCID) 20 mg tablet, Take 1 tablet (20 mg total) by mouth 2 (two) times a day, Disp: 180 tablet, Rfl: 5    levothyroxine 75 mcg tablet, TAKE ONE TABLET BY MOUTH IN THE EARLY MORNING, Disp: 90 tablet, Rfl: 1    multivitamin (THERAGRAN) TABS, Take 1 tablet by mouth in the morning., Disp: , Rfl:     pantoprazole (PROTONIX) 40 mg tablet, Take 1 tablet by mouth daily before breakfast, Disp: 90 tablet, Rfl: 1    topiramate (TOPAMAX) 100 mg tablet, Take 1 tablet (100 mg total) by mouth 2 (two) times a day, Disp: 180 tablet, Rfl: 1    traZODone (DESYREL) 50 mg tablet, Take 1 tablet by mouth daily at bedtime as needed for sleep, Disp: 90 tablet, Rfl: 1    venlafaxine (EFFEXOR-XR) 150 mg 24 hr capsule, TAKE ONE CAPSULE BY MOUTH EVERY DAY, Disp: 90 capsule, Rfl: 1  [3]   Allergies  Allergen Reactions    Clindamycin Hives

## 2025-06-19 ENCOUNTER — TELEPHONE (OUTPATIENT)
Dept: PAIN MEDICINE | Facility: CLINIC | Age: 75
End: 2025-06-19

## 2025-07-02 NOTE — PROGRESS NOTES
Daily Note     Today's date: 9/15/2021  Patient name: Sukhdeep Hernandez  : 1950  MRN: [id number]  Referring provider: Marv Wright*  Dx:   Encounter Diagnosis     ICD-10-CM    1  Status post total replacement of left hip  Z96 [id number]    2  Right hip pain  [id number] [id number]        Start Time: 1101  Stop Time: 1136  Total time in clinic (min): 35 minutes    Subjective: pt reports that she is "always in pain from R hip"  Objective: See treatment diary below      Assessment: Tolerated treatment well  Attempted LAD to the R hip with no change in symptoms  Patient would benefit from continued PT    Pt completed additional exercises with increased R hip pain  Treatment session was reduced secondary to R hip symptoms  We will continue to progress as tolerated  Plan: Continue per plan of care  Precautions: Posterior Hip precautions, R knee TKA, Hx lower lumbar fusion, R shoulder TSA      Manuals 9/7 9/10 9/15          L hip PROM             R hip PROM   RK                                    Neuro Re-Ed             bridges x10 x10 2x10          Hip add 5" x10 5"x10 5"x10          Hip abd  gtb 3" x20 gtb 5" x20          marching x10 2x10 x20                                                 Ther Ex             bike  5' 5'          Mini squats x10 x10 x10          Side stepping  3 laps 3 laps          Step ups   6" x10 L LE only          Lateral step ups             Heel slides  x20           Standing hip abd/ext x10 ea  b/l x20 ea  b/l x10 ea  b/l          HR/TR   x20 ea            Ther Activity                                       Gait Training                                       Modalities
Patient has not attended skilled PT in 1 month, will be discharged due to hospital policy 
Apixaban/Eliquis is used to treat and prevent blood clots. If you are not able to swallow the tablets whole, they may be crushed and mixed in water, apple juice, or applesauce and promptly taken within four hours. Never skip a dose of Apixaban/Eliquis. If you forget to take your Apixaban/Eliquis, take a dose as soon as you remember. If it is almost time for your next Apixaban/Eliquis dose, wait until then and take a regular dose. DO NOT take an extra pill to ‘catch up’.  NEVER TAKE A DOUBLE DOSE. Notify your doctor that you missed a dose. Take Apixaban/Eliquis at the same time each morning and evening. Apixaban/Eliquis may be taken with other medication or food.

## 2025-07-09 ENCOUNTER — TELEPHONE (OUTPATIENT)
Age: 75
End: 2025-07-09

## 2025-07-09 DIAGNOSIS — Z12.31 ENCOUNTER FOR SCREENING MAMMOGRAM FOR BREAST CANCER: Primary | ICD-10-CM
